# Patient Record
Sex: FEMALE | Race: BLACK OR AFRICAN AMERICAN | NOT HISPANIC OR LATINO | Employment: FULL TIME | URBAN - METROPOLITAN AREA
[De-identification: names, ages, dates, MRNs, and addresses within clinical notes are randomized per-mention and may not be internally consistent; named-entity substitution may affect disease eponyms.]

---

## 2017-11-14 ENCOUNTER — HOSPITAL ENCOUNTER (OUTPATIENT)
Dept: PREADMISSION TESTING | Facility: HOSPITAL | Age: 52
Discharge: HOME | End: 2017-11-14
Attending: ORTHOPAEDIC SURGERY | Admitting: ORTHOPAEDIC SURGERY

## 2017-11-14 LAB
ALBUMIN SERPL-MCNC: 3.9 G/DL (ref 3.5–4.8)
ALBUMIN/GLOB SERPL: 1.1 {RATIO} (ref 1–1.7)
ALP SERPL-CCNC: 78 IU/L (ref 32–91)
ALT SERPL-CCNC: 28 IU/L (ref 14–54)
ANION GAP SERPL CALC-SCNC: 11.5 MMOL/L (ref 10–20)
AST SERPL-CCNC: 23 IU/L (ref 15–41)
BASOPHILS # BLD AUTO: 0 10*3/UL (ref 0–0.2)
BASOPHILS NFR BLD AUTO: 1 % (ref 0–2)
BILIRUB SERPL-MCNC: 0.6 MG/DL (ref 0.3–1.2)
BUN SERPL-MCNC: 7 MG/DL (ref 8–20)
BUN/CREAT SERPL: 8.8 (ref 5.4–26.2)
CALCIUM SERPL-MCNC: 9.7 MG/DL (ref 8.9–10.3)
CHLORIDE SERPL-SCNC: 100 MMOL/L (ref 101–111)
CONV CO2: 29 MMOL/L (ref 22–32)
CONV TOTAL PROTEIN: 7.5 G/DL (ref 6.1–7.9)
CREAT UR-MCNC: 0.8 MG/DL (ref 0.4–1)
DIFFERENTIAL METHOD BLD: (no result)
EOSINOPHIL # BLD AUTO: 0.1 10*3/UL (ref 0–0.3)
EOSINOPHIL # BLD AUTO: 1 % (ref 0–3)
ERYTHROCYTE [DISTWIDTH] IN BLOOD BY AUTOMATED COUNT: 13.9 % (ref 11.5–14.5)
GLOBULIN UR ELPH-MCNC: 3.6 G/DL (ref 2.5–3.8)
GLUCOSE SERPL-MCNC: 104 MG/DL (ref 65–99)
HCT VFR BLD AUTO: 42.2 % (ref 35–49)
HGB BLD-MCNC: 14 G/DL (ref 12–15)
LYMPHOCYTES # BLD AUTO: 2.7 10*3/UL (ref 0.8–4.8)
LYMPHOCYTES NFR BLD AUTO: 46 % (ref 18–42)
MCH RBC QN AUTO: 30.1 PG (ref 26–32)
MCHC RBC AUTO-ENTMCNC: 33.1 G/DL (ref 32–36)
MCV RBC AUTO: 91.1 FL (ref 80–94)
MONOCYTES # BLD AUTO: 0.6 10*3/UL (ref 0.1–1.3)
MONOCYTES NFR BLD AUTO: 10 % (ref 2–11)
NEUTROPHILS # BLD AUTO: 2.5 10*3/UL (ref 2.3–8.6)
NEUTROPHILS NFR BLD AUTO: 42 % (ref 50–75)
NRBC BLD AUTO-RTO: 0 /100{WBCS}
NRBC/RBC NFR BLD MANUAL: 0 10*3/UL
PLATELET # BLD AUTO: 368 10*3/UL (ref 150–450)
PMV BLD AUTO: 7.9 FL (ref 7.4–10.4)
POTASSIUM SERPL-SCNC: 3.5 MMOL/L (ref 3.6–5.1)
RBC # BLD AUTO: 4.64 10*6/UL (ref 4–5.4)
SODIUM SERPL-SCNC: 137 MMOL/L (ref 136–144)
WBC # BLD AUTO: 5.9 10*3/UL (ref 4.5–11.5)

## 2018-06-25 ENCOUNTER — HOSPITAL ENCOUNTER (OUTPATIENT)
Dept: PAIN MEDICINE | Facility: HOSPITAL | Age: 53
Discharge: HOME | End: 2018-06-25
Attending: ANESTHESIOLOGY | Admitting: ANESTHESIOLOGY

## 2018-07-17 ENCOUNTER — HOSPITAL ENCOUNTER (OUTPATIENT)
Dept: PAIN MEDICINE | Facility: HOSPITAL | Age: 53
Discharge: HOME | End: 2018-07-17
Attending: ANESTHESIOLOGY | Admitting: ANESTHESIOLOGY

## 2018-08-14 ENCOUNTER — HOSPITAL ENCOUNTER (OUTPATIENT)
Dept: PAIN MEDICINE | Facility: HOSPITAL | Age: 53
Discharge: HOME | End: 2018-08-14
Attending: ANESTHESIOLOGY | Admitting: ANESTHESIOLOGY

## 2018-09-18 ENCOUNTER — HOSPITAL ENCOUNTER (OUTPATIENT)
Dept: PAIN MEDICINE | Facility: HOSPITAL | Age: 53
Discharge: HOME | End: 2018-09-18
Attending: ANESTHESIOLOGY | Admitting: ANESTHESIOLOGY

## 2018-10-09 ENCOUNTER — HOSPITAL ENCOUNTER (OUTPATIENT)
Dept: OTHER | Facility: HOSPITAL | Age: 53
Discharge: HOME | End: 2018-10-09
Attending: ANESTHESIOLOGY | Admitting: ANESTHESIOLOGY

## 2018-10-09 LAB
ALBUMIN SERPL-MCNC: 3.8 G/DL (ref 3.5–4.8)
ALBUMIN/GLOB SERPL: 1 {RATIO} (ref 1–1.7)
ALP SERPL-CCNC: 73 IU/L (ref 32–91)
ALT SERPL-CCNC: 18 IU/L (ref 14–54)
ANION GAP SERPL CALC-SCNC: 11.3 MMOL/L (ref 10–20)
APTT BLD: 22.1 SEC (ref 24–31)
AST SERPL-CCNC: 17 IU/L (ref 15–41)
BACTERIA SPEC AEROBE CULT: NORMAL
BASOPHILS # BLD AUTO: 0.1 10*3/UL (ref 0–0.2)
BASOPHILS NFR BLD AUTO: 1 % (ref 0–2)
BILIRUB SERPL-MCNC: 0.6 MG/DL (ref 0.3–1.2)
BILIRUB UR QL STRIP: NEGATIVE MG/DL
BUN SERPL-MCNC: 12 MG/DL (ref 8–20)
BUN/CREAT SERPL: 15 (ref 5.4–26.2)
CALCIUM SERPL-MCNC: 9.7 MG/DL (ref 8.9–10.3)
CASTS URNS QL MICRO: ABNORMAL /[LPF]
CHLORIDE SERPL-SCNC: 99 MMOL/L (ref 101–111)
COLOR UR: YELLOW
CONV BACTERIA IN URINE MICRO: ABNORMAL
CONV CLARITY OF URINE: CLEAR
CONV CO2: 32 MMOL/L (ref 22–32)
CONV HYALINE CASTS IN URINE MICRO: 3 /[LPF] (ref 0–5)
CONV PROTEIN IN URINE BY AUTOMATED TEST STRIP: NEGATIVE MG/DL
CONV SMALL ROUND CELLS: ABNORMAL /[HPF]
CONV TOTAL PROTEIN: 7.7 G/DL (ref 6.1–7.9)
CONV UROBILINOGEN IN URINE BY AUTOMATED TEST STRIP: 1 MG/DL
CREAT UR-MCNC: 0.8 MG/DL (ref 0.4–1)
CULTURE INDICATED?: ABNORMAL
CULTURE INDICATED?: ABNORMAL
DIFFERENTIAL METHOD BLD: (no result)
EOSINOPHIL # BLD AUTO: 0.1 10*3/UL (ref 0–0.3)
EOSINOPHIL # BLD AUTO: 2 % (ref 0–3)
ERYTHROCYTE [DISTWIDTH] IN BLOOD BY AUTOMATED COUNT: 15 % (ref 11.5–14.5)
GLOBULIN UR ELPH-MCNC: 3.9 G/DL (ref 2.5–3.8)
GLUCOSE SERPL-MCNC: 105 MG/DL (ref 65–99)
GLUCOSE UR QL: NEGATIVE MG/DL
HCT VFR BLD AUTO: 43.4 % (ref 35–49)
HGB BLD-MCNC: 14.4 G/DL (ref 12–15)
HGB UR QL STRIP: NEGATIVE
INR PPP: 1
KETONES UR QL STRIP: NEGATIVE MG/DL
LEUKOCYTE ESTERASE UR QL STRIP: NEGATIVE
LYMPHOCYTES # BLD AUTO: 2.8 10*3/UL (ref 0.8–4.8)
LYMPHOCYTES NFR BLD AUTO: 46 % (ref 18–42)
Lab: NORMAL
MCH RBC QN AUTO: 29.6 PG (ref 26–32)
MCHC RBC AUTO-ENTMCNC: 33.1 G/DL (ref 32–36)
MCV RBC AUTO: 89.4 FL (ref 80–94)
MICRO REPORT STATUS: NORMAL
MONOCYTES # BLD AUTO: 0.5 10*3/UL (ref 0.1–1.3)
MONOCYTES NFR BLD AUTO: 8 % (ref 2–11)
NEUTROPHILS # BLD AUTO: 2.7 10*3/UL (ref 2.3–8.6)
NEUTROPHILS NFR BLD AUTO: 43 % (ref 50–75)
NITRITE UR QL STRIP: NEGATIVE
NRBC BLD AUTO-RTO: 0 /100{WBCS}
NRBC/RBC NFR BLD MANUAL: 0 10*3/UL
PH UR STRIP.AUTO: 7 [PH] (ref 4.5–8)
PLATELET # BLD AUTO: 374 10*3/UL (ref 150–450)
PMV BLD AUTO: 7.9 FL (ref 7.4–10.4)
POTASSIUM SERPL-SCNC: 3.3 MMOL/L (ref 3.6–5.1)
PROTHROMBIN TIME: 10 SEC (ref 9.6–11.7)
RBC # BLD AUTO: 4.86 10*6/UL (ref 4–5.4)
RBC #/AREA URNS HPF: 2 /[HPF] (ref 0–3)
SODIUM SERPL-SCNC: 139 MMOL/L (ref 136–144)
SP GR UR: 1.02 (ref 1–1.03)
SPECIMEN SOURCE: NORMAL
SPERM URNS QL MICRO: ABNORMAL /[HPF]
SQUAMOUS SPT QL MICRO: 3 /[HPF] (ref 0–5)
UNIDENT CRYS URNS QL MICRO: ABNORMAL /[HPF]
WBC # BLD AUTO: 6.2 10*3/UL (ref 4.5–11.5)
WBC #/AREA URNS HPF: 1 /[HPF] (ref 0–5)
YEAST SPEC QL WET PREP: ABNORMAL /[HPF]

## 2018-11-15 ENCOUNTER — HOSPITAL ENCOUNTER (OUTPATIENT)
Dept: GENERAL RADIOLOGY | Facility: HOSPITAL | Age: 53
Discharge: HOME | End: 2018-11-15
Attending: NEUROLOGICAL SURGERY | Admitting: NEUROLOGICAL SURGERY

## 2018-12-11 ENCOUNTER — HOSPITAL ENCOUNTER (OUTPATIENT)
Dept: GENERAL RADIOLOGY | Facility: HOSPITAL | Age: 53
Discharge: HOME | End: 2018-12-11
Attending: NEUROLOGICAL SURGERY | Admitting: NEUROLOGICAL SURGERY

## 2019-01-16 ENCOUNTER — HOSPITAL ENCOUNTER (OUTPATIENT)
Dept: PAIN MEDICINE | Facility: HOSPITAL | Age: 54
Discharge: HOME | End: 2019-01-16
Attending: ANESTHESIOLOGY | Admitting: ANESTHESIOLOGY

## 2019-01-24 ENCOUNTER — HOSPITAL ENCOUNTER (OUTPATIENT)
Dept: GENERAL RADIOLOGY | Facility: HOSPITAL | Age: 54
Discharge: HOME | End: 2019-01-24
Attending: NEUROLOGICAL SURGERY | Admitting: NEUROLOGICAL SURGERY

## 2021-05-05 ENCOUNTER — PRIOR AUTHORIZATION (OUTPATIENT)
Dept: PAIN MEDICINE | Facility: CLINIC | Age: 56
End: 2021-05-05

## 2021-05-05 ENCOUNTER — OFFICE VISIT (OUTPATIENT)
Dept: PAIN MEDICINE | Facility: CLINIC | Age: 56
End: 2021-05-05

## 2021-05-05 VITALS
BODY MASS INDEX: 32.49 KG/M2 | SYSTOLIC BLOOD PRESSURE: 118 MMHG | HEIGHT: 65 IN | TEMPERATURE: 97.5 F | OXYGEN SATURATION: 99 % | HEART RATE: 78 BPM | WEIGHT: 195 LBS | RESPIRATION RATE: 16 BRPM | DIASTOLIC BLOOD PRESSURE: 82 MMHG

## 2021-05-05 DIAGNOSIS — M54.16 LUMBAR RADICULITIS: ICD-10-CM

## 2021-05-05 DIAGNOSIS — G89.4 CHRONIC PAIN SYNDROME: Primary | ICD-10-CM

## 2021-05-05 DIAGNOSIS — M47.817 LUMBOSACRAL SPONDYLOSIS WITHOUT MYELOPATHY: ICD-10-CM

## 2021-05-05 DIAGNOSIS — M47.812 CERVICAL SPONDYLOSIS WITHOUT MYELOPATHY: ICD-10-CM

## 2021-05-05 PROCEDURE — 99214 OFFICE O/P EST MOD 30 MIN: CPT | Performed by: ANESTHESIOLOGY

## 2021-05-05 RX ORDER — IBUPROFEN 800 MG/1
TABLET ORAL
COMMUNITY

## 2021-05-05 RX ORDER — VALACYCLOVIR HYDROCHLORIDE 1 G/1
TABLET, FILM COATED ORAL
COMMUNITY

## 2021-05-05 RX ORDER — PHENDIMETRAZINE TARTRATE 35 MG/1
TABLET ORAL
COMMUNITY
End: 2021-10-05

## 2021-05-05 RX ORDER — FLUCONAZOLE 200 MG/1
TABLET ORAL
COMMUNITY
Start: 2021-04-12 | End: 2021-08-24

## 2021-05-05 RX ORDER — CHLORTHALIDONE 25 MG/1
TABLET ORAL
COMMUNITY
End: 2021-10-05

## 2021-05-05 RX ORDER — PANTOPRAZOLE SODIUM 40 MG/1
TABLET, DELAYED RELEASE ORAL
COMMUNITY

## 2021-05-05 RX ORDER — ATENOLOL 50 MG/1
TABLET ORAL
COMMUNITY
End: 2021-10-05

## 2021-05-05 RX ORDER — CYANOCOBALAMIN 1000 UG/ML
INJECTION, SOLUTION INTRAMUSCULAR; SUBCUTANEOUS
COMMUNITY
End: 2021-10-05

## 2021-05-05 RX ORDER — BACLOFEN 10 MG/1
TABLET ORAL
COMMUNITY
Start: 2021-04-12

## 2021-05-05 RX ORDER — BACLOFEN 10 MG/1
TABLET ORAL
COMMUNITY
End: 2021-05-05 | Stop reason: SDUPTHER

## 2021-05-05 RX ORDER — TRAMADOL HYDROCHLORIDE 50 MG/1
TABLET ORAL
COMMUNITY
End: 2022-11-03

## 2021-05-05 RX ORDER — SUCRALFATE 1 G/1
TABLET ORAL
COMMUNITY
End: 2021-08-24

## 2021-05-05 NOTE — PROGRESS NOTES
Subjective    CC back pain  Rita Prado is a 56 y.o. female with polyarthralgia S/P bilateral shoulder surgeries, chronic neck pain S/P cervical fusion Oct 2018/Dr. Tai, back pain, here for f/u.   Last seen 2 years ago.  Present for evaluation of worsening lower back pain and right lower extremity radicular pain.  Symptoms for about 4 weeks after she restarted work.  Her job consists of some twisting and bending with standing.  Also reports right leg perceived weakness, occasional bowel incontinence.  Worsening chronic lower back pain radiating to bilateral lower extremity usually worse on the left with burning tingling numbness in the leg, worse with standing prolonged sitting or activity.    Pain interfere with ADL/sleep and work.    Had LESI  in the past with good relief.  Back pain interfering with daily activity/PT/Sleep     Utilizes baclofen and tramadol prescribed by PCP with mild relief.      L-spine MRI 2013: Diffuse degenerative changes in the lower lumbar spine. Disk bulging is seen at L2-L3, L3-L4, and L4-L5. Focal bulge (protrusion) of the disk of L5-S1 central and subligamentous, mild to moderate. No focal extrusion is evident.  Chronic degenerative signal alteration along endplates at L5-S1.  C-spine MRI multilevel degenerative changes, slight retrolisthesis C5-C6 asymmetry to the left.  Multiple level foraminal narrowing.  Osteophyte complex..     Pain Assessment   Location of Pain: Lower Back, R Hip, L Hip, L Leg, neck pain, joint  Description of Pain: Dull/Aching, Throbbing, Stabbing  Previous Pain Rating :10  Current Pain Rating: 10  Aggravating Factors: Activity  Alleviating Factors: Rest, Medication    PEG Assessment   What number best describes your pain on average in the past week?10  What number best describes how, during the past week, pain has interfered with your enjoyment of life?5  What number best describes how, during the past week, pain has interfered with your general activity?  "10     The following portions of the patient's history were reviewed and updated as appropriate: allergies, current medications, past family history, past medical history, past social history, past surgical history and problem list.     has a past medical history of GERD (gastroesophageal reflux disease), Hypertension, and Low back pain.   has a past surgical history that includes Rotator cuff repair; Shoulder surgery; and Neck surgery.  family history includes Alcohol abuse in her father; Cancer in her mother; Dementia in her father; Heart failure in her mother; Hypertension in her mother.  Social History     Tobacco Use   • Smoking status: Never Smoker   Substance Use Topics   • Alcohol use: Not Currently       Review of Systems   Musculoskeletal: Positive for back pain.        Left leg pain and weakness   All other systems reviewed and are negative.      Objective   Physical Exam  Constitutional:       General: She is not in acute distress.  Pulmonary:      Effort: Pulmonary effort is normal.   Musculoskeletal:      Lumbar back: Spasms and tenderness present. Decreased range of motion. Positive left straight leg raise test.      Comments: Lumbar loading positive, pain on extension of low back past 5 degrees.  TTP on the lumbar facets noted.  Left lumbar paraspinal tenderness   Neurological:      Gait: Gait abnormal.      Comments: Antalgic gait, right dorsiflexion strength 4 out of 5.       /82   Pulse 78   Temp 97.5 °F (36.4 °C)   Resp 16   Ht 165.1 cm (65\")   Wt 88.5 kg (195 lb)   SpO2 99%   BMI 32.45 kg/m²      PHQ 9 on chart  Opioid risk tool low risk    Assessment/Plan   Diagnoses and all orders for this visit:    1. Chronic pain syndrome (Primary)    2. Lumbosacral spondylosis without myelopathy    3. Lumbar radiculitis  -     Epidural Block    4. Cervical spondylosis without myelopathy    Summary  Rita Prado is a 56 y.o. female with polyarthralgia S/P bilateral shoulder surgeries, chronic " neck pain S/P cervical fusion Oct 2018/Dr. Tai, back pain, here for f/u.   Chronic pain from lumbar DDD spondylosis.  Chronic back pain postlaminectomy syndrome.    Last seen 2 years ago.  Present for evaluation of worsening lower back pain and  right lower extremity radicular pain associated with severe right lumbar paraspinal spasms.  Symptoms for about 4 weeks after she restarted work.  Her job consists of some twisting and bending with standing.  Also reports right leg perceived weakness, occasional bowel incontinence.  Pain interfere with ADL/work and sleep.    L-spine MRI pending.  Schedule for LESI x2.    RTC for procedure

## 2021-05-07 ENCOUNTER — TELEPHONE (OUTPATIENT)
Dept: PAIN MEDICINE | Facility: CLINIC | Age: 56
End: 2021-05-07

## 2021-05-07 NOTE — TELEPHONE ENCOUNTER
Provider: WILMAN GILLESPIE  Caller: BEULAH LAURENT  Relationship to Patient: SELF    Phone Number: [phone]    Reason for Call: PATIENT REQUESTING TO RESCHEDULE HER LUMBAR EPIDURAL-NEEDS TO SEE IF EPIDURAL IS AVAILABLE TO BE DONE ON A Friday DUE TO TIME OFF NEEDED FROM WORK

## 2021-05-17 ENCOUNTER — APPOINTMENT (OUTPATIENT)
Dept: PAIN MEDICINE | Facility: HOSPITAL | Age: 56
End: 2021-05-17

## 2021-05-27 ENCOUNTER — TELEPHONE (OUTPATIENT)
Dept: PAIN MEDICINE | Facility: HOSPITAL | Age: 56
End: 2021-05-27

## 2021-05-27 DIAGNOSIS — G89.4 CHRONIC PAIN SYNDROME: Primary | ICD-10-CM

## 2021-05-27 RX ORDER — DIAZEPAM 10 MG/1
10 TABLET ORAL ONCE
Qty: 1 TABLET | Refills: 0 | Status: SHIPPED | OUTPATIENT
Start: 2021-05-27 | End: 2021-05-27

## 2021-05-27 NOTE — TELEPHONE ENCOUNTER
Patient is requesting a valium to be sent into her pharmacy so she can take before her epidural tomorrow.

## 2021-05-28 ENCOUNTER — HOSPITAL ENCOUNTER (OUTPATIENT)
Dept: PAIN MEDICINE | Facility: HOSPITAL | Age: 56
Discharge: HOME | End: 2021-05-28

## 2021-05-28 VITALS
DIASTOLIC BLOOD PRESSURE: 95 MMHG | RESPIRATION RATE: 16 BRPM | BODY MASS INDEX: 33.32 KG/M2 | SYSTOLIC BLOOD PRESSURE: 130 MMHG | HEART RATE: 83 BPM | WEIGHT: 200 LBS | OXYGEN SATURATION: 98 % | HEIGHT: 65 IN | TEMPERATURE: 97.1 F

## 2021-05-28 DIAGNOSIS — M54.16 LUMBAR RADICULITIS: Primary | ICD-10-CM

## 2021-05-28 DIAGNOSIS — R52 PAIN: ICD-10-CM

## 2021-05-28 PROCEDURE — 62323 NJX INTERLAMINAR LMBR/SAC: CPT | Performed by: ANESTHESIOLOGY

## 2021-05-28 PROCEDURE — 0 IOPAMIDOL 41 % SOLUTION: Performed by: ANESTHESIOLOGY

## 2021-05-28 PROCEDURE — 77003 FLUOROGUIDE FOR SPINE INJECT: CPT

## 2021-05-28 PROCEDURE — 25010000002 METHYLPREDNISOLONE PER 40 MG: Performed by: ANESTHESIOLOGY

## 2021-05-28 RX ORDER — BUPIVACAINE HYDROCHLORIDE 2.5 MG/ML
10 INJECTION, SOLUTION EPIDURAL; INFILTRATION; INTRACAUDAL ONCE
Status: COMPLETED | OUTPATIENT
Start: 2021-05-28 | End: 2021-05-28

## 2021-05-28 RX ORDER — METHYLPREDNISOLONE ACETATE 40 MG/ML
40 INJECTION, SUSPENSION INTRA-ARTICULAR; INTRALESIONAL; INTRAMUSCULAR; SOFT TISSUE ONCE
Status: COMPLETED | OUTPATIENT
Start: 2021-05-28 | End: 2021-05-28

## 2021-05-28 RX ADMIN — METHYLPREDNISOLONE ACETATE 40 MG: 40 INJECTION, SUSPENSION INTRA-ARTICULAR; INTRALESIONAL; INTRAMUSCULAR; SOFT TISSUE at 09:19

## 2021-05-28 RX ADMIN — BUPIVACAINE HYDROCHLORIDE 10 ML: 2.5 INJECTION, SOLUTION EPIDURAL; INFILTRATION; INTRACAUDAL; PERINEURAL at 09:19

## 2021-05-28 RX ADMIN — IOPAMIDOL 3 ML: 408 INJECTION, SOLUTION INTRATHECAL at 09:00

## 2021-05-28 NOTE — ADDENDUM NOTE
Encounter addended by: Suzy Beckman LPN on: 5/28/2021 9:43 AM   Actions taken: MAR administration accepted

## 2021-05-28 NOTE — PROCEDURES
"Subjective    CC back pain  Rita Prado is a 56 y.o. female with lumbar radiculitis here for caudal UBALDO  No anticoagulation    Pain Assessment   Location of Pain: Lower Back, R Hip, L Hip, R Leg,   Description of Pain: Dull/Aching, Throbbing, Stabbing  Previous Pain Rating :7  Current Pain Ratin  Aggravating Factors: Activity  Alleviating Factors: Rest, Medication    The following portions of the patient's history were reviewed and updated as appropriate: allergies, current medications, past family history, past medical history, past social history, past surgical history and problem list.    Review of Systems  As in HPI  Objective   Physical Exam  Constitutional:       General: She is not in acute distress.     Appearance: She is well-developed.   Cardiovascular:      Rate and Rhythm: Normal rate.   Pulmonary:      Effort: Pulmonary effort is normal.   Musculoskeletal:      Lumbar back: Tenderness present. Decreased range of motion.   Neurological:      Mental Status: She is alert and oriented to person, place, and time.       /87   Pulse 81   Temp 97.1 °F (36.2 °C)   Resp 16   Ht 165.1 cm (65\")   Wt 90.7 kg (200 lb)   SpO2 98%   BMI 33.28 kg/m²     Assessment/Plan    underwent caudal UBALDO.  Interlaminar approach was attempted but not achieved.  L-spine MRI reviewed 2021 showing moderate degenerative changes progressed from previous.  Notably disc bulge with superimposed small right paracentral disc extrusion traversing 3 mm inferiorly narrowing the right lateral recess and abutting the descending right S1 nerve root. Mild bilateral facet arthropathy. Mild spinal canal stenosis. Moderate to severe right and severe left neural foraminal stenosis.     We will schedule for right L4 and L5 transforaminal UBALDO.    RTC for procedure    DATE OF PROCEDURE: 2021    PREOPERATIVE DIAGNOSIS:  lumbosacral DDD and radiculitis  POSTOPERATIVE DIAGNOSIS: Same    PROCEDURE PERFORMED: Caudal Epidural Steroid " Injection    The patient presents with a history of  lumbosacral degenerative disc disease with lumbosacral neuritis. The patient presents today for a caudal epidural steroid injection. The patient understands the risks and benefits of the procedure and wishes to proceed. The patient was seen in the preoperative area.  Patient's consent was obtained and updated.  Vitals were taken.  Patient was then brought to the procedure suite and placed in a prone position. The appropriate anatomic area was widely prepped with  Chloraprep and draped in a sterile fashion. Noninvasive monitoring per routine anesthesia protocol was placed.  Under fluoroscopic guidance using a lateral view a 22 guage curved spinal needle was passed through skin anesthesized with 1% Lidocaine without epinephrine.  The needle was advanced through the sacral hiatus and into the sacral epidural space using fluoroscopic guidance. Needle tip placement in the epidural space was confirmed by loss of resistance and injection of  1.5  mL of  preservative free contrast. Following this 10 mL of a solution containing  1 mL of 40 mg Depo-Medrol,  1 mL of  0.25% bupivacaine and 8 mL of preservative-free saline was carefully administered in the epidural space.   A sterile dressing was placed over the puncture site.    The patient tolerated the procedure with  no complications. They were then brought to the post procedure area where they recovered nicely.     Discharge:  The patient will be discharged home in stable condition.   Patient understands to contact the Center with any post procedure questions or concerns.  Discharge instructions given by nursing staff.

## 2021-06-11 ENCOUNTER — APPOINTMENT (OUTPATIENT)
Dept: PAIN MEDICINE | Facility: HOSPITAL | Age: 56
End: 2021-06-11

## 2021-06-25 ENCOUNTER — TELEPHONE (OUTPATIENT)
Dept: PAIN MEDICINE | Facility: CLINIC | Age: 56
End: 2021-06-25

## 2021-06-25 RX ORDER — DIAZEPAM 10 MG/1
10 TABLET ORAL ONCE AS NEEDED
Qty: 1 TABLET | Refills: 0 | Status: SHIPPED | OUTPATIENT
Start: 2021-06-25 | End: 2021-08-24

## 2021-06-25 NOTE — TELEPHONE ENCOUNTER
Caller: BEULAH LAURENT    Relationship: SELF    Best call back number: [phone]    What is the best time to reach you: ANY    Who are you requesting to speak with (clinical staff, provider,  specific staff member): UNKNOWN    What was the call regarding: UNKNOWN    Do you require a callback: YES. PATIENT HAD A CALL BUT NO VOICEMAIL    PHARMACY CLOSES AT 6 TONIGHT AND NEEDS A VALIUM SENT OVER BEFORE THEN

## 2021-06-28 ENCOUNTER — HOSPITAL ENCOUNTER (OUTPATIENT)
Dept: PAIN MEDICINE | Facility: HOSPITAL | Age: 56
Discharge: HOME | End: 2021-06-28

## 2021-06-28 ENCOUNTER — DOCUMENTATION (OUTPATIENT)
Dept: PAIN MEDICINE | Facility: CLINIC | Age: 56
End: 2021-06-28

## 2021-06-28 VITALS
OXYGEN SATURATION: 96 % | HEART RATE: 78 BPM | BODY MASS INDEX: 32.14 KG/M2 | RESPIRATION RATE: 16 BRPM | DIASTOLIC BLOOD PRESSURE: 81 MMHG | HEIGHT: 66 IN | WEIGHT: 200 LBS | TEMPERATURE: 96.9 F | SYSTOLIC BLOOD PRESSURE: 128 MMHG

## 2021-06-28 DIAGNOSIS — R52 PAIN: ICD-10-CM

## 2021-06-28 DIAGNOSIS — M54.16 LUMBAR RADICULITIS: Primary | ICD-10-CM

## 2021-06-28 PROCEDURE — 0 IOPAMIDOL 41 % SOLUTION: Performed by: ANESTHESIOLOGY

## 2021-06-28 PROCEDURE — 64484 NJX AA&/STRD TFRM EPI L/S EA: CPT | Performed by: ANESTHESIOLOGY

## 2021-06-28 PROCEDURE — 77003 FLUOROGUIDE FOR SPINE INJECT: CPT

## 2021-06-28 PROCEDURE — 64483 NJX AA&/STRD TFRM EPI L/S 1: CPT | Performed by: ANESTHESIOLOGY

## 2021-06-28 PROCEDURE — 25010000002 DEXAMETHASONE SODIUM PHOSPHATE 10 MG/ML SOLUTION: Performed by: ANESTHESIOLOGY

## 2021-06-28 RX ORDER — DEXAMETHASONE SODIUM PHOSPHATE 10 MG/ML
10 INJECTION, SOLUTION INTRAMUSCULAR; INTRAVENOUS ONCE
Status: COMPLETED | OUTPATIENT
Start: 2021-06-28 | End: 2021-06-28

## 2021-06-28 RX ORDER — ERGOCALCIFEROL 1.25 MG/1
50000 CAPSULE ORAL
COMMUNITY
End: 2021-06-28 | Stop reason: SDUPTHER

## 2021-06-28 RX ORDER — SACCHAROMYCES BOULARDII 250 MG
CAPSULE ORAL
COMMUNITY
End: 2021-08-24

## 2021-06-28 RX ORDER — DIAZEPAM 10 MG/1
10 TABLET ORAL ONCE
Qty: 1 TABLET | Refills: 0 | Status: SHIPPED | OUTPATIENT
Start: 2021-06-28 | End: 2021-06-28

## 2021-06-28 RX ORDER — CALCIUM POLYCARBOPHIL 625 MG/1
TABLET, FILM COATED ORAL
COMMUNITY
Start: 2021-06-03

## 2021-06-28 RX ORDER — BUPIVACAINE HYDROCHLORIDE 2.5 MG/ML
10 INJECTION, SOLUTION EPIDURAL; INFILTRATION; INTRACAUDAL ONCE
Status: COMPLETED | OUTPATIENT
Start: 2021-06-28 | End: 2021-06-28

## 2021-06-28 RX ADMIN — BUPIVACAINE HYDROCHLORIDE 5 ML: 2.5 INJECTION, SOLUTION EPIDURAL; INFILTRATION; INTRACAUDAL; PERINEURAL at 09:11

## 2021-06-28 RX ADMIN — DEXAMETHASONE SODIUM PHOSPHATE 10 MG: 10 INJECTION, SOLUTION INTRAMUSCULAR; INTRAVENOUS at 09:11

## 2021-06-28 RX ADMIN — IOPAMIDOL 1 ML: 408 INJECTION, SOLUTION INTRATHECAL at 09:08

## 2021-06-28 NOTE — ADDENDUM NOTE
Encounter addended by: Jacki Snyder MD on: 6/28/2021 12:16 PM   Actions taken: Order list changed, Diagnosis association updated

## 2021-06-28 NOTE — PROGRESS NOTES
PT would like to know if you can send her in 1 valium to take before procedure. SCHEDULED 07/19

## 2021-06-28 NOTE — PROCEDURES
"Subjective    CC back pain  Rita Prado is a 56 y.o. female with lumbar radiculitis here for right L4, L5 TFESI. No anticoagulation    Pain Assessment   Location of Pain: Lower Back, R Hip, L Hip, R Leg,   Description of Pain: Dull/Aching, Throbbing, Stabbing  Previous Pain Rating :7  Current Pain Ratin  Aggravating Factors: Activity  Alleviating Factors: Rest, Medication    The following portions of the patient's history were reviewed and updated as appropriate: allergies, current medications, past family history, past medical history, past social history, past surgical history and problem list.    Review of Systems  As in HPI  Objective   Physical Exam  Constitutional:       General: She is not in acute distress.     Appearance: She is well-developed.   Cardiovascular:      Rate and Rhythm: Normal rate.   Pulmonary:      Effort: Pulmonary effort is normal.   Musculoskeletal:      Lumbar back: Tenderness present. Decreased range of motion.   Neurological:      Mental Status: She is alert and oriented to person, place, and time.       /81 (BP Location: Left arm, Patient Position: Lying)   Pulse 78   Temp 96.9 °F (36.1 °C) (Skin)   Resp 16   Ht 166.4 cm (65.5\")   Wt 90.7 kg (200 lb)   SpO2 96%   BMI 32.78 kg/m²     Assessment/Plan    underwent right L4, L5 TFESI  Complains of pain on left side and left leg as well and would like TESI on the left.   Will schedule for Left L4, L5 TFESI.  RTC for procedure    DATE OF PROCEDURE:  2021    PREOPERATIVE DIAGNOSIS:   Lumbar radiculitis    POSTOPERATIVE DIAGNOSIS: Same    PROCEDURE PERFORMED: Right L4, L5  Transforaminal Epidural     The patient presents with a history of  lumbar degenerative disc disease with lumbosacral neuritis in the right leg at level [ L4,  L5].  The patient presents today for a transforaminal epidural. The patient understands the risks and benefits of the procedure and wishes to proceed. The patient was seen in the preoperative " area.  Patient's consent was obtained and updated.  Vitals were taken.  Patient was then brought to the procedure suite and placed in a prone position. Noninvasive monitoring per routine anesthesia protocol was placed.  The appropriate anatomic area was widely prepped with Chloroprep and draped in a sterile fashion.  Under fluoroscopic guidance using an AP and lateral view, a 22 guage curved tip spinal needle  was passed through skin anesthetized with 1% Lidocaine without epinephrine. The needle tip was advanced to the inferior medial aspect of the transverse process and carefully walked into the neuroforamin using an AP lateral view.  At no time were parathesias elicited.  At this point 2 mL of a solution containing  1 mL of 0.25% bupivacaine and 1 mL of 10 mg Decadron were injected.  Clear epidural spread using 0.25 mL of  preservative free contrast was obtained.  A sterile dressing was placed over the puncture site.    The patient tolerated the procedure with no complications . They were then brought to the post procedure area where they recovered nicely.    Discharge:  The patient will be discharged home in stable condition.   Patient understands to contact the Center with any post procedure questions or concerns.  Discharge instructions given by nursing staff.

## 2021-06-28 NOTE — PROGRESS NOTES
L-spine MRI reviewed 5/2021 showing moderate degenerative changes progressed from previous.  Notably disc bulge with superimposed small right paracentral disc extrusion traversing 3 mm inferiorly narrowing the right lateral recess and abutting the descending right S1 nerve root. Mild bilateral facet arthropathy. Mild spinal canal stenosis. Moderate to severe right and severe left neural foraminal stenosis.

## 2021-06-29 ENCOUNTER — TELEPHONE (OUTPATIENT)
Dept: PAIN MEDICINE | Facility: HOSPITAL | Age: 56
End: 2021-06-29

## 2021-07-12 ENCOUNTER — HOSPITAL ENCOUNTER (OUTPATIENT)
Dept: PAIN MEDICINE | Facility: HOSPITAL | Age: 56
Discharge: HOME | End: 2021-07-12

## 2021-07-12 VITALS
SYSTOLIC BLOOD PRESSURE: 117 MMHG | HEIGHT: 66 IN | TEMPERATURE: 98 F | DIASTOLIC BLOOD PRESSURE: 95 MMHG | HEART RATE: 95 BPM | RESPIRATION RATE: 16 BRPM | WEIGHT: 200 LBS | BODY MASS INDEX: 32.14 KG/M2 | OXYGEN SATURATION: 98 %

## 2021-07-12 DIAGNOSIS — M47.814 THORACIC SPONDYLOSIS: ICD-10-CM

## 2021-07-12 DIAGNOSIS — R52 PAIN: ICD-10-CM

## 2021-07-12 DIAGNOSIS — M54.16 LUMBAR RADICULITIS: Primary | ICD-10-CM

## 2021-07-12 DIAGNOSIS — M54.14 THORACIC NEURITIS: ICD-10-CM

## 2021-07-12 PROCEDURE — 77003 FLUOROGUIDE FOR SPINE INJECT: CPT

## 2021-07-12 PROCEDURE — 0 IOPAMIDOL 41 % SOLUTION: Performed by: ANESTHESIOLOGY

## 2021-07-12 PROCEDURE — 64483 NJX AA&/STRD TFRM EPI L/S 1: CPT | Performed by: ANESTHESIOLOGY

## 2021-07-12 PROCEDURE — 64484 NJX AA&/STRD TFRM EPI L/S EA: CPT | Performed by: ANESTHESIOLOGY

## 2021-07-12 PROCEDURE — 25010000002 DEXAMETHASONE SODIUM PHOSPHATE 10 MG/ML SOLUTION: Performed by: ANESTHESIOLOGY

## 2021-07-12 RX ORDER — BUPIVACAINE HYDROCHLORIDE 2.5 MG/ML
10 INJECTION, SOLUTION EPIDURAL; INFILTRATION; INTRACAUDAL ONCE
Status: COMPLETED | OUTPATIENT
Start: 2021-07-12 | End: 2021-07-12

## 2021-07-12 RX ORDER — DEXAMETHASONE SODIUM PHOSPHATE 10 MG/ML
10 INJECTION, SOLUTION INTRAMUSCULAR; INTRAVENOUS ONCE
Status: COMPLETED | OUTPATIENT
Start: 2021-07-12 | End: 2021-07-12

## 2021-07-12 RX ADMIN — BUPIVACAINE HYDROCHLORIDE 10 ML: 2.5 INJECTION, SOLUTION EPIDURAL; INFILTRATION; INTRACAUDAL; PERINEURAL at 11:37

## 2021-07-12 RX ADMIN — DEXAMETHASONE SODIUM PHOSPHATE 10 MG: 10 INJECTION, SOLUTION INTRAMUSCULAR; INTRAVENOUS at 11:37

## 2021-07-12 RX ADMIN — IOPAMIDOL 3 ML: 408 INJECTION, SOLUTION INTRATHECAL at 11:36

## 2021-07-12 NOTE — PROCEDURES
"Subjective    CC back pain  Rita Prado is a 56 y.o. female with lumbar radiculitis here for left L4, L5 TFESI. No anticoagulation    Pain Assessment   Location of Pain: Lower Back, R Hip, L Hip, R Leg,   Description of Pain: Dull/Aching, Throbbing, Stabbing  Previous Pain Rating :7  Current Pain Ratin  Aggravating Factors: Activity  Alleviating Factors: Rest, Medication    The following portions of the patient's history were reviewed and updated as appropriate: allergies, current medications, past family history, past medical history, past social history, past surgical history and problem list.    Review of Systems  As in HPI  Objective   Physical Exam  Constitutional:       General: She is not in acute distress.     Appearance: She is well-developed.   Cardiovascular:      Rate and Rhythm: Normal rate.   Pulmonary:      Effort: Pulmonary effort is normal.   Musculoskeletal:      Lumbar back: Tenderness present. Decreased range of motion.   Neurological:      Mental Status: She is alert and oriented to person, place, and time.       /95 (BP Location: Left arm, Patient Position: Sitting)   Pulse 95   Temp 98 °F (36.7 °C) (Skin)   Resp 16   Ht 166.4 cm (65.5\")   Wt 90.7 kg (200 lb)   SpO2 98%   BMI 32.78 kg/m²     Assessment/Plan    underwent left L4, L5 TFESI  Complains of continued and worsening thoracic back pain radiating to chest.  T-spine MRI ordered for further evaluation.  No relief of physical therapy.  Interfering with work    RTC 6 weeks    DATE OF PROCEDURE:  2021    PREOPERATIVE DIAGNOSIS:   Lumbar radiculitis    POSTOPERATIVE DIAGNOSIS: Same    PROCEDURE PERFORMED: Left L4, L5  Transforaminal Epidural     The patient presents with a history of  lumbar degenerative disc disease with lumbosacral neuritis in the left leg at level [ L4,  L5].  The patient presents today for a transforaminal epidural. The patient understands the risks and benefits of the procedure and wishes to " proceed. The patient was seen in the preoperative area.  Patient's consent was obtained and updated.  Vitals were taken.  Patient was then brought to the procedure suite and placed in a prone position. Noninvasive monitoring per routine anesthesia protocol was placed.  The appropriate anatomic area was widely prepped with Chloroprep and draped in a sterile fashion.  Under fluoroscopic guidance using an AP and lateral view, a 22 guage curved tip spinal needle  was passed through skin anesthetized with 1% Lidocaine without epinephrine. The needle tip was advanced to the inferior medial aspect of the transverse process and carefully walked into the neuroforamin using an AP lateral view.  At no time were parathesias elicited.  At this point 2 mL of a solution containing  1 mL of 0.25% bupivacaine and 1 mL of 10 mg Decadron were injected.  Clear epidural spread using 0.25 mL of  preservative free contrast was obtained.  A sterile dressing was placed over the puncture site.    The patient tolerated the procedure with no complications . They were then brought to the post procedure area where they recovered nicely.    Discharge:  The patient will be discharged home in stable condition.   Patient understands to contact the Center with any post procedure questions or concerns.  Discharge instructions given by nursing staff.

## 2021-07-13 ENCOUNTER — TELEPHONE (OUTPATIENT)
Dept: PAIN MEDICINE | Facility: HOSPITAL | Age: 56
End: 2021-07-13

## 2021-07-13 NOTE — TELEPHONE ENCOUNTER
Post op procedure call made. Spoke with patient, he reports doing well with no questions or concerns.

## 2021-08-24 ENCOUNTER — OFFICE VISIT (OUTPATIENT)
Dept: PAIN MEDICINE | Facility: CLINIC | Age: 56
End: 2021-08-24

## 2021-08-24 VITALS
HEIGHT: 66 IN | BODY MASS INDEX: 32.14 KG/M2 | RESPIRATION RATE: 16 BRPM | WEIGHT: 200 LBS | DIASTOLIC BLOOD PRESSURE: 88 MMHG | HEART RATE: 74 BPM | SYSTOLIC BLOOD PRESSURE: 127 MMHG | OXYGEN SATURATION: 95 %

## 2021-08-24 DIAGNOSIS — M54.14 THORACIC NEURITIS: ICD-10-CM

## 2021-08-24 DIAGNOSIS — M47.817 LUMBOSACRAL SPONDYLOSIS WITHOUT MYELOPATHY: ICD-10-CM

## 2021-08-24 DIAGNOSIS — M96.1 POSTLAMINECTOMY SYNDROME OF CERVICAL REGION: ICD-10-CM

## 2021-08-24 DIAGNOSIS — M47.814 THORACIC SPONDYLOSIS: ICD-10-CM

## 2021-08-24 DIAGNOSIS — G89.4 CHRONIC PAIN SYNDROME: Primary | ICD-10-CM

## 2021-08-24 PROCEDURE — 99214 OFFICE O/P EST MOD 30 MIN: CPT | Performed by: ANESTHESIOLOGY

## 2021-08-24 NOTE — PROGRESS NOTES
Subjective    CC back pain  Rita Prado is a 56 y.o. female with polyarthralgia S/P bilateral shoulder surgeries, chronic neck pain S/P ACDF Oct 2018/Dr. Tai, back pain, here for f/u.   Good relief with right and left lumbar transforaminal UBALDO.  Significant functional benefit discussed/returned to work.  Complains of continued and worsening mid back/thoracic back pain associated with significant paraspinal muscle spasm and pain.  Chronic lower back pain radiating to bilateral lower extremity usually worse on the left with burning tingling numbness in the leg, worse with standing prolonged sitting or activity.  Denies weakness, bladder bowel continence.  Chronic neck pain from postlaminectomy syndrome with paraspinal muscle spasm radiating to bilateral shoulders.     Pain interfere with ADL/sleep and work.    Good relief of LESI, transforaminal UBALDO, cervical UBALDO.    Utilizes baclofen and tramadol prescribed by PCP with mild relief.    L-spine MRI reviewed 5/2021 showing moderate degenerative changes progressed from previous.  Notably disc bulge with superimposed small right paracentral disc extrusion traversing 3 mm inferiorly narrowing the right lateral recess and abutting the descending right S1 nerve root. Mild bilateral facet arthropathy. Mild spinal canal stenosis. Moderate to severe right and severe left neural foraminal stenosis.     C-spine MRI multilevel degenerative changes, slight retrolisthesis C5-C6 asymmetry to the left.  Multiple level foraminal narrowing.  Osteophyte complex..     Pain Assessment   Location of Pain: Lower Back, R Hip, L Hip, L Leg, neck pain, joint  Description of Pain: Dull/Aching, Throbbing, Stabbing  Previous Pain Rating :10  Current Pain Rating: 10  Aggravating Factors: Activity  Alleviating Factors: Rest, Medication    PEG Assessment   What number best describes your pain on average in the past week?10  What number best describes how, during the past week, pain has  "interfered with your enjoyment of life?5  What number best describes how, during the past week, pain has interfered with your general activity? 10     The following portions of the patient's history were reviewed and updated as appropriate: allergies, current medications, past family history, past medical history, past social history, past surgical history and problem list.     has a past medical history of GERD (gastroesophageal reflux disease), Hypertension, and Low back pain.   has a past surgical history that includes Rotator cuff repair; Shoulder surgery; and Neck surgery.  family history includes Alcohol abuse in her father; Cancer in her mother; Dementia in her father; Heart failure in her mother; Hypertension in her mother.  Social History     Tobacco Use   • Smoking status: Never Smoker   • Smokeless tobacco: Never Used   Substance Use Topics   • Alcohol use: Not Currently       Review of Systems   Musculoskeletal: Positive for back pain.        Left leg pain and weakness   All other systems reviewed and are negative.    Objective   Physical Exam  Vitals reviewed.   Constitutional:       General: She is not in acute distress.  Neck:      Meningeal: Kernig's sign present.   Pulmonary:      Effort: Pulmonary effort is normal.   Musculoskeletal:      Cervical back: Tenderness present. Decreased range of motion.      Thoracic back: Spasms and tenderness present.      Lumbar back: Spasms and tenderness present. Decreased range of motion. Positive left straight leg raise test.      Comments: Lumbar loading positive, pain on extension of low back past 5 degrees.  TTP on the lumbar facets noted.  Left lumbar paraspinal tenderness   Neurological:      Gait: Gait abnormal.      Comments: Antalgic gait, right dorsiflexion strength 4 out of 5.       /88   Pulse 74   Resp 16   Ht 166.4 cm (65.5\")   Wt 90.7 kg (200 lb)   SpO2 95%   BMI 32.78 kg/m²      PHQ 9 on chart  Opioid risk tool low " risk    Assessment/Plan   Diagnoses and all orders for this visit:    1. Chronic pain syndrome (Primary)    2. Thoracic spondylosis    3. Thoracic neuritis  -     MRI Thoracic Spine Without Contrast; Future    4. Lumbosacral spondylosis without myelopathy    5. Postlaminectomy syndrome of cervical region    Summary  Rita Prado is a 56 y.o. female with polyarthralgia S/P bilateral shoulder surgeries, chronic neck pain S/P cervical fusion Oct 2018/Dr. Tai, back pain, here for f/u.   Chronic pain from lumbar DDD spondylosis.  Chronic back pain postlaminectomy syndrome.    Good relief with right and left lumbar transforaminal UBALDO.  Significant functional benefit discussed/returned to work.    Complains of continued and worsening mid back/thoracic back pain associated with significant paraspinal muscle spasm and pain.  Pain interfere with sleep and work.  T-spine MRI ordered for evaluation.  Consider thoracic UBALDO after review.  Risk and benefits discussed    Start over-the-counter Voltaren gel    RTC 6 weeks or after MRI.

## 2021-10-05 ENCOUNTER — OFFICE VISIT (OUTPATIENT)
Dept: PAIN MEDICINE | Facility: CLINIC | Age: 56
End: 2021-10-05

## 2021-10-05 VITALS
RESPIRATION RATE: 16 BRPM | BODY MASS INDEX: 32.14 KG/M2 | SYSTOLIC BLOOD PRESSURE: 146 MMHG | DIASTOLIC BLOOD PRESSURE: 95 MMHG | OXYGEN SATURATION: 95 % | HEART RATE: 80 BPM | WEIGHT: 200 LBS | HEIGHT: 66 IN

## 2021-10-05 DIAGNOSIS — G89.4 CHRONIC PAIN SYNDROME: Primary | ICD-10-CM

## 2021-10-05 DIAGNOSIS — M47.817 LUMBOSACRAL SPONDYLOSIS WITHOUT MYELOPATHY: ICD-10-CM

## 2021-10-05 DIAGNOSIS — M54.14 THORACIC NEURITIS: ICD-10-CM

## 2021-10-05 DIAGNOSIS — M47.814 THORACIC SPONDYLOSIS: ICD-10-CM

## 2021-10-05 DIAGNOSIS — M96.1 POSTLAMINECTOMY SYNDROME OF CERVICAL REGION: ICD-10-CM

## 2021-10-05 DIAGNOSIS — M46.1 SACROILIITIS (HCC): ICD-10-CM

## 2021-10-05 PROCEDURE — 99214 OFFICE O/P EST MOD 30 MIN: CPT | Performed by: ANESTHESIOLOGY

## 2021-10-05 RX ORDER — MINOXIDIL 10 MG/1
10 TABLET ORAL DAILY
COMMUNITY
Start: 2021-08-26

## 2021-10-05 RX ORDER — DULAGLUTIDE 0.75 MG/.5ML
INJECTION, SOLUTION SUBCUTANEOUS WEEKLY
COMMUNITY
Start: 2021-08-26 | End: 2022-05-17

## 2021-10-05 RX ORDER — METOPROLOL TARTRATE AND HYDROCHLOROTHIAZIDE 50; 25 MG/1; MG/1
TABLET ORAL DAILY
COMMUNITY
Start: 2021-08-26

## 2021-10-05 NOTE — PROGRESS NOTES
Subjective    CC back pain  Rita Prado is a 56 y.o. female with polyarthralgia S/P bilateral shoulder surgeries, chronic neck pain S/P ACDF Oct 2018/Dr. Tai, back pain, here for f/u.   Complains of right buttock/SI tenderness referring with work and walking.  T-spine MRI not completed yet.  Continued mid back/thoracic back pain associated with significant paraspinal muscle spasm and pain.  Chronic lower back pain radiating to bilateral lower extremity usually worse on the left with burning tingling numbness in the leg, worse with standing prolonged sitting or activity.  Denies weakness, bladder bowel continence.  Chronic neck pain from postlaminectomy syndrome with paraspinal muscle spasm radiating to bilateral shoulders.     Pain interfere with ADL/sleep and work.    Good relief of LESI, transforaminal UBALDO, cervical UBALDO.    Utilizes baclofen and tramadol prescribed by PCP with mild relief.    L-spine MRI reviewed 2021 showing moderate degenerative changes progressed from previous.  Notably disc bulge with superimposed small right paracentral disc extrusion traversing 3 mm inferiorly narrowing the right lateral recess and abutting the descending right S1 nerve root. Mild bilateral facet arthropathy. Mild spinal canal stenosis. Moderate to severe right and severe left neural foraminal stenosis.     C-spine MRI multilevel degenerative changes, slight retrolisthesis C5-C6 asymmetry to the left.  Multiple level foraminal narrowing.  Osteophyte complex..     Pain Assessment   Location of Pain: Lower Back, R Hip, L Hip, L Leg, neck pain, joint  Description of Pain: Dull/Aching, Throbbing, Stabbing  Previous Pain Rating :10  Current Pain Ratin  Aggravating Factors: Activity  Alleviating Factors: Rest, Medication    PEG Assessment   What number best describes your pain on average in the past week?7  What number best describes how, during the past week, pain has interfered with your enjoyment of life?5  What  "number best describes how, during the past week, pain has interfered with your general activity? 8     The following portions of the patient's history were reviewed and updated as appropriate: allergies, current medications, past family history, past medical history, past social history, past surgical history and problem list.     has a past medical history of GERD (gastroesophageal reflux disease), Hypertension, and Low back pain.   has a past surgical history that includes Rotator cuff repair; Shoulder surgery; and Neck surgery.  family history includes Alcohol abuse in her father; Cancer in her mother; Dementia in her father; Heart failure in her mother; Hypertension in her mother.  Social History     Tobacco Use   • Smoking status: Never Smoker   • Smokeless tobacco: Never Used   Substance Use Topics   • Alcohol use: Not Currently       Review of Systems   Musculoskeletal: Positive for back pain.        Left leg pain and weakness   All other systems reviewed and are negative.    Objective   Physical Exam  Vitals reviewed.   Constitutional:       General: She is not in acute distress.  Neck:      Meningeal: Kernig's sign present.   Pulmonary:      Effort: Pulmonary effort is normal.   Musculoskeletal:      Cervical back: Tenderness present. Decreased range of motion.      Thoracic back: Spasms and tenderness present.      Lumbar back: Spasms and tenderness present. Decreased range of motion. Positive left straight leg raise test.      Comments: Lumbar loading positive, pain on extension of low back past 5 degrees.  TTP on the lumbar facets noted.  Left lumbar paraspinal tenderness   Neurological:      Gait: Gait abnormal.      Comments: Antalgic gait, right dorsiflexion strength 4 out of 5.       /95   Pulse 80   Resp 16   Ht 166.4 cm (65.5\")   Wt 90.7 kg (200 lb)   SpO2 95%   BMI 32.78 kg/m²      PHQ 9 on chart  Opioid risk tool low risk    Assessment/Plan   Diagnoses and all orders for this " visit:    1. Chronic pain syndrome (Primary)    2. Thoracic spondylosis    3. Thoracic neuritis    4. Postlaminectomy syndrome of cervical region    5. Lumbosacral spondylosis without myelopathy    6. Sacroiliitis (HCC)  -     SI Joint Injection    Summary  Rita Prado is a 56 y.o. female with polyarthralgia S/P bilateral shoulder surgeries, chronic neck pain S/P cervical fusion Oct 2018/Dr. Tai, back pain, here for f/u.   Chronic pain from lumbar DDD spondylosis.  Chronic back pain postlaminectomy syndrome.    Complains of right buttock/SI tenderness referring with work and walking.  Denies significant lower back pain.  Schedule for right SI injection.  Risk and benefit discussed.    Continue need thoracic back pain mostly right-sided.  T-spine MRI not completed yet.    Continues to be frustrated with chronic pain neck/back pain mostly right-sided in pretty much continued chronic pain to right side of her body interfering with any activity and work.   Utilizes baclofen, tramadol, ibuprofen with marginal relief.  Considered but declined more opioid at this time.    Discussed medical stimulator therapy/trial process.  Information material provided for review.  Will discuss further at next visits.    RTC for procedure

## 2021-11-12 ENCOUNTER — HOSPITAL ENCOUNTER (OUTPATIENT)
Dept: PAIN MEDICINE | Facility: HOSPITAL | Age: 56
Discharge: HOME | End: 2021-11-12

## 2021-11-12 VITALS
WEIGHT: 195 LBS | RESPIRATION RATE: 16 BRPM | OXYGEN SATURATION: 96 % | HEIGHT: 66 IN | HEART RATE: 94 BPM | DIASTOLIC BLOOD PRESSURE: 80 MMHG | BODY MASS INDEX: 31.34 KG/M2 | SYSTOLIC BLOOD PRESSURE: 108 MMHG | TEMPERATURE: 98 F

## 2021-11-12 DIAGNOSIS — R52 PAIN: ICD-10-CM

## 2021-11-12 DIAGNOSIS — M46.1 SACROILIITIS (HCC): ICD-10-CM

## 2021-11-12 PROCEDURE — 25010000002 METHYLPREDNISOLONE PER 40 MG: Performed by: ANESTHESIOLOGY

## 2021-11-12 PROCEDURE — 27096 INJECT SACROILIAC JOINT: CPT | Performed by: ANESTHESIOLOGY

## 2021-11-12 PROCEDURE — 77003 FLUOROGUIDE FOR SPINE INJECT: CPT

## 2021-11-12 PROCEDURE — 0 IOPAMIDOL 41 % SOLUTION: Performed by: ANESTHESIOLOGY

## 2021-11-12 RX ORDER — METHYLPREDNISOLONE ACETATE 40 MG/ML
40 INJECTION, SUSPENSION INTRA-ARTICULAR; INTRALESIONAL; INTRAMUSCULAR; SOFT TISSUE ONCE
Status: COMPLETED | OUTPATIENT
Start: 2021-11-12 | End: 2021-11-12

## 2021-11-12 RX ORDER — BUPIVACAINE HYDROCHLORIDE 2.5 MG/ML
10 INJECTION, SOLUTION EPIDURAL; INFILTRATION; INTRACAUDAL ONCE
Status: COMPLETED | OUTPATIENT
Start: 2021-11-12 | End: 2021-11-12

## 2021-11-12 RX ADMIN — METHYLPREDNISOLONE ACETATE 40 MG: 40 INJECTION, SUSPENSION INTRA-ARTICULAR; INTRALESIONAL; INTRAMUSCULAR; INTRASYNOVIAL; SOFT TISSUE at 09:27

## 2021-11-12 RX ADMIN — BUPIVACAINE HYDROCHLORIDE 4 ML: 2.5 INJECTION, SOLUTION EPIDURAL; INFILTRATION; INTRACAUDAL; PERINEURAL at 09:27

## 2021-11-12 RX ADMIN — IOPAMIDOL 1 ML: 408 INJECTION, SOLUTION INTRATHECAL at 09:27

## 2021-11-12 NOTE — PROCEDURES
"Subjective    CC back pain  Rita Prado is a 56 y.o. female with right sacroiliitis here for right SI injection.. No anticoagulation    Pain Assessment   Location of Pain: Lower Back, R Hip, L Hip, R Leg,   Description of Pain: Dull/Aching, Throbbing, Stabbing  Previous Pain Rating :7  Current Pain Ratin  Aggravating Factors: Activity  Alleviating Factors: Rest, Medication    The following portions of the patient's history were reviewed and updated as appropriate: allergies, current medications, past family history, past medical history, past social history, past surgical history and problem list.    Review of Systems  As in HPI  Objective   Physical Exam  Constitutional:       General: She is not in acute distress.     Appearance: She is well-developed.   Cardiovascular:      Rate and Rhythm: Normal rate.   Pulmonary:      Effort: Pulmonary effort is normal.   Musculoskeletal:      Lumbar back: Tenderness present. Decreased range of motion.   Neurological:      Mental Status: She is alert and oriented to person, place, and time.       /82 (BP Location: Right arm, Patient Position: Sitting)   Pulse 99   Temp 98 °F (36.7 °C) (Skin)   Resp 16   Ht 166.4 cm (65.5\")   Wt 88.5 kg (195 lb)   SpO2 97%   BMI 31.96 kg/m²     Assessment/Plan    underwent right SI injection.    RTC as needed.    DATE OF PROCEDURE:  2021    PREOPERATIVE DIAGNOSIS: sacroiliitis    POSTOPERATIVE DIAGNOSIS: same    PROCEDURE PERFORMED: Right SACROILIAC JOINT INJECTION    The patient understands the risks and benefits of the procedure and wishes to proceed. The patient was seen in the preoperative area. Patient's consent was obtained and updated. Vitals were taken. Patient was then brought to the procedure suite and placed in prone position for sacroiliac joint injection. The appropriate anatomic area was widely prepped with Chloraprep and draped in a sterile fashion. Noninvasive monitoring per routine anesthesia protocol " was placed. Under fluoroscopic guidance using an AP view, a 22 gauge curved tip spinal needle was passed through skin anesthetized with 1% Lidocaine without epinephrine. The needle tip was guided to the lower pole of the joint using fluoroscopy. 1 mL of  preservative free contrast was injected into the joint to confirm location. A clear outline was obtained and 5 mL of steroid solution containing 4 mL 0.25% bupivacaine, and 1mL 40mg Depomedrol was injected. The patient tolerated with no keke-procedural complications.  A sterile dressing was placed over the puncture sites.

## 2022-05-17 ENCOUNTER — OFFICE VISIT (OUTPATIENT)
Dept: PAIN MEDICINE | Facility: CLINIC | Age: 57
End: 2022-05-17

## 2022-05-17 VITALS
BODY MASS INDEX: 31.34 KG/M2 | HEIGHT: 66 IN | DIASTOLIC BLOOD PRESSURE: 96 MMHG | HEART RATE: 85 BPM | RESPIRATION RATE: 16 BRPM | WEIGHT: 195 LBS | SYSTOLIC BLOOD PRESSURE: 139 MMHG | OXYGEN SATURATION: 95 %

## 2022-05-17 DIAGNOSIS — G89.4 CHRONIC PAIN SYNDROME: Primary | ICD-10-CM

## 2022-05-17 DIAGNOSIS — M96.1 POSTLAMINECTOMY SYNDROME OF CERVICAL REGION: ICD-10-CM

## 2022-05-17 DIAGNOSIS — M46.1 SACROILIITIS: ICD-10-CM

## 2022-05-17 DIAGNOSIS — M54.12 CERVICAL RADICULITIS: ICD-10-CM

## 2022-05-17 DIAGNOSIS — M47.817 LUMBOSACRAL SPONDYLOSIS WITHOUT MYELOPATHY: ICD-10-CM

## 2022-05-17 PROCEDURE — 99214 OFFICE O/P EST MOD 30 MIN: CPT | Performed by: ANESTHESIOLOGY

## 2022-05-18 NOTE — PROGRESS NOTES
Subjective    CC back pain  Rita Prado is a 57 y.o. female with polyarthralgia S/P bilateral shoulder surgeries, chronic neck pain S/P ACDF Oct 2018/Dr. Tai, back pain, here for f/u.   Last seen several months ago.  Had 70 to 80% relief with right SI injection.  Presents  today with symptoms returning with low back right posterior hip/SI tenderness.  Chronic mid back/thoracic back pain associated with significant paraspinal muscle spasm and pain.  Chronic lower back pain radiating to bilateral lower extremity usually worse on the left with burning tingling numbness in the leg, worse with standing prolonged sitting or activity.  Denies weakness, bladder bowel continence.  Chronic neck pain from postlaminectomy syndrome with paraspinal muscle spasm radiating to bilateral shoulders.     Pain interfere with ADL/sleep and work.    Good relief of LESI, transforaminal UBADLO, cervical UBALDO.    Utilizes baclofen and tramadol prescribed by PCP with mild relief.    L-spine MRI reviewed 2021 showing moderate degenerative changes progressed from previous.  Notably disc bulge with superimposed small right paracentral disc extrusion traversing 3 mm inferiorly narrowing the right lateral recess and abutting the descending right S1 nerve root. Mild bilateral facet arthropathy. Mild spinal canal stenosis. Moderate to severe right and severe left neural foraminal stenosis.     C-spine MRI multilevel degenerative changes, slight retrolisthesis C5-C6 asymmetry to the left.  Multiple level foraminal narrowing.  Osteophyte complex..     Pain Assessment   Location of Pain: Lower Back, R Hip, L Hip, L Leg, neck pain, joint  Description of Pain: Dull/Aching, Throbbing, Stabbing  Previous Pain Rating :7  Current Pain Ratin  Aggravating Factors: Activity  Alleviating Factors: Rest, Medication    PEG Assessment   What number best describes your pain on average in the past week?7  What number best describes how, during the past week,  "pain has interfered with your enjoyment of life?6  What number best describes how, during the past week, pain has interfered with your general activity? 10     The following portions of the patient's history were reviewed and updated as appropriate: allergies, current medications, past family history, past medical history, past social history, past surgical history and problem list.     has a past medical history of GERD (gastroesophageal reflux disease), Hypertension, Low back pain, and Shoulder pain.   has a past surgical history that includes Rotator cuff repair; Shoulder surgery; and Neck surgery.  family history includes Alcohol abuse in her father; Cancer in her mother; Dementia in her father; Heart failure in her mother; Hypertension in her mother.  Social History     Tobacco Use   • Smoking status: Never Smoker   • Smokeless tobacco: Never Used   Substance Use Topics   • Alcohol use: Not Currently       Review of Systems   Musculoskeletal: Positive for back pain.        Left leg pain and weakness   All other systems reviewed and are negative.    Objective   Physical Exam  Vitals reviewed.   Constitutional:       General: She is not in acute distress.  Neck:      Meningeal: Kernig's sign present.   Pulmonary:      Effort: Pulmonary effort is normal.   Musculoskeletal:      Cervical back: Tenderness present. Decreased range of motion.      Thoracic back: Spasms and tenderness present.      Lumbar back: Spasms and tenderness present. Decreased range of motion. Positive left straight leg raise test.      Comments: Lumbar loading positive, pain on extension of low back past 5 degrees.  TTP on the lumbar facets noted.  Left lumbar paraspinal tenderness   Neurological:      Gait: Gait abnormal.      Comments: Antalgic gait, right dorsiflexion strength 4 out of 5.       /96   Pulse 85   Resp 16   Ht 166.4 cm (65.5\")   Wt 88.5 kg (195 lb)   SpO2 95%   BMI 31.96 kg/m²      PHQ 9 on chart  Opioid risk tool " low risk    Assessment & Plan   Diagnoses and all orders for this visit:    1. Chronic pain syndrome (Primary)    2. Postlaminectomy syndrome of cervical region    3. Sacroiliitis (HCC)  -     SI Joint Injection    4. Cervical radiculitis    5. Lumbosacral spondylosis without myelopathy    Summary  Rtia Prado is a 57 y.o. female with polyarthralgia S/P bilateral shoulder surgeries, chronic neck pain S/P cervical fusion Oct 2018/Dr. Tai, back pain, here for f/u.   Chronic pain from lumbar DDD spondylosis.  Chronic back pain postlaminectomy syndrome.    Last seen several months ago.  Had 70 to 80% relief with right SI injection.    Presents today with symptoms returning with low back right posterior hip/SI tenderness.  Schedule for repeat right SI injection.  Risk and benefit discussed.    Also complains of worsening axial neck pain radiating to both shoulders from DDD postlaminectomy syndrome.  Consider cervical UBALDO.    RTC for procedure

## 2022-05-19 ENCOUNTER — TELEPHONE (OUTPATIENT)
Dept: PAIN MEDICINE | Facility: CLINIC | Age: 57
End: 2022-05-19

## 2022-05-19 NOTE — TELEPHONE ENCOUNTER
Caller: Rita Prado    Relationship to patient: Self    Best call back number: [phone]    Type of visit: SI JOINT INJECTION    Requested date: ASAP    Additional notes: PATIENT WAS CALLING TO SEE IF THERE WERE ANY CANCELLATIONS FOR INJECTION FOR TODAY.

## 2022-05-23 ENCOUNTER — APPOINTMENT (OUTPATIENT)
Dept: PAIN MEDICINE | Facility: HOSPITAL | Age: 57
End: 2022-05-23

## 2022-06-10 ENCOUNTER — APPOINTMENT (OUTPATIENT)
Dept: PAIN MEDICINE | Facility: HOSPITAL | Age: 57
End: 2022-06-10

## 2022-06-24 ENCOUNTER — HOSPITAL ENCOUNTER (OUTPATIENT)
Dept: PAIN MEDICINE | Facility: HOSPITAL | Age: 57
Discharge: HOME | End: 2022-06-24

## 2022-06-24 VITALS
BODY MASS INDEX: 31.34 KG/M2 | HEART RATE: 79 BPM | WEIGHT: 195 LBS | RESPIRATION RATE: 16 BRPM | DIASTOLIC BLOOD PRESSURE: 89 MMHG | TEMPERATURE: 97.5 F | HEIGHT: 66 IN | SYSTOLIC BLOOD PRESSURE: 126 MMHG | OXYGEN SATURATION: 93 %

## 2022-06-24 DIAGNOSIS — R52 PAIN: ICD-10-CM

## 2022-06-24 DIAGNOSIS — M46.1 SACROILIITIS: Primary | ICD-10-CM

## 2022-06-24 DIAGNOSIS — M54.14 THORACIC NEURITIS: ICD-10-CM

## 2022-06-24 PROCEDURE — 77003 FLUOROGUIDE FOR SPINE INJECT: CPT

## 2022-06-24 PROCEDURE — 25010000002 METHYLPREDNISOLONE PER 40 MG: Performed by: ANESTHESIOLOGY

## 2022-06-24 PROCEDURE — 27096 INJECT SACROILIAC JOINT: CPT | Performed by: ANESTHESIOLOGY

## 2022-06-24 PROCEDURE — 0 IOPAMIDOL 41 % SOLUTION: Performed by: ANESTHESIOLOGY

## 2022-06-24 RX ORDER — BUPIVACAINE HYDROCHLORIDE 2.5 MG/ML
10 INJECTION, SOLUTION EPIDURAL; INFILTRATION; INTRACAUDAL ONCE
Status: COMPLETED | OUTPATIENT
Start: 2022-06-24 | End: 2022-06-24

## 2022-06-24 RX ORDER — METHYLPREDNISOLONE ACETATE 40 MG/ML
40 INJECTION, SUSPENSION INTRA-ARTICULAR; INTRALESIONAL; INTRAMUSCULAR; SOFT TISSUE ONCE
Status: COMPLETED | OUTPATIENT
Start: 2022-06-24 | End: 2022-06-24

## 2022-06-24 RX ADMIN — METHYLPREDNISOLONE ACETATE 40 MG: 40 INJECTION, SUSPENSION INTRA-ARTICULAR; INTRALESIONAL; INTRAMUSCULAR; INTRASYNOVIAL; SOFT TISSUE at 11:45

## 2022-06-24 RX ADMIN — IOPAMIDOL 3 ML: 408 INJECTION, SOLUTION INTRATHECAL at 11:45

## 2022-06-24 RX ADMIN — BUPIVACAINE HYDROCHLORIDE 10 ML: 2.5 INJECTION, SOLUTION EPIDURAL; INFILTRATION; INTRACAUDAL; PERINEURAL at 11:45

## 2022-06-24 NOTE — PROCEDURES
"Subjective    CC back pain  Rita Prado is a 57 y.o. female with right sacroiliitis here for repeat right SI injection.. No anticoagulation    Pain Assessment   Location of Pain: Lower Back, R Hip, L Hip, R Leg,   Description of Pain: Dull/Aching, Throbbing, Stabbing  Previous Pain Rating :7  Current Pain Ratin  Aggravating Factors: Activity  Alleviating Factors: Rest, Medication    The following portions of the patient's history were reviewed and updated as appropriate: allergies, current medications, past family history, past medical history, past social history, past surgical history and problem list.    Review of Systems  As in HPI  Objective   Physical Exam  Constitutional:       General: She is not in acute distress.     Appearance: She is well-developed.   Cardiovascular:      Rate and Rhythm: Normal rate.   Pulmonary:      Effort: Pulmonary effort is normal.   Musculoskeletal:      Lumbar back: Tenderness present. Decreased range of motion.   Neurological:      Mental Status: She is alert and oriented to person, place, and time.       /89 (BP Location: Left arm, Patient Position: Sitting)   Pulse 79   Temp 97.5 °F (36.4 °C) (Skin)   Resp 16   Ht 166.4 cm (65.5\")   Wt 88.5 kg (195 lb)   SpO2 93%   BMI 31.96 kg/m²     Assessment & Plan    underwent repeat  right SI injection.    RTC as needed.    DATE OF PROCEDURE:  2022    PREOPERATIVE DIAGNOSIS: sacroiliitis    POSTOPERATIVE DIAGNOSIS: same    PROCEDURE PERFORMED: Right SACROILIAC JOINT INJECTION    The patient understands the risks and benefits of the procedure and wishes to proceed. The patient was seen in the preoperative area. Patient's consent was obtained and updated. Vitals were taken. Patient was then brought to the procedure suite and placed in prone position for sacroiliac joint injection. The appropriate anatomic area was widely prepped with Chloraprep and draped in a sterile fashion. Noninvasive monitoring per routine " anesthesia protocol was placed. Under fluoroscopic guidance using an AP view, a 22 gauge curved tip spinal needle was passed through skin anesthetized with 1% Lidocaine without epinephrine. The needle tip was guided to the lower pole of the joint using fluoroscopy. 1 mL of  preservative free contrast was injected into the joint to confirm location. A clear outline was obtained and 5 mL of steroid solution containing 4 mL 0.25% bupivacaine, and 1mL 40mg Depomedrol was injected. The patient tolerated with no keke-procedural complications.  A sterile dressing was placed over the puncture sites.

## 2022-09-02 ENCOUNTER — HOSPITAL ENCOUNTER (OUTPATIENT)
Dept: PAIN MEDICINE | Facility: HOSPITAL | Age: 57
Discharge: HOME | End: 2022-09-02

## 2022-09-02 VITALS
RESPIRATION RATE: 16 BRPM | OXYGEN SATURATION: 99 % | BODY MASS INDEX: 32.47 KG/M2 | HEART RATE: 90 BPM | HEIGHT: 66 IN | TEMPERATURE: 97.3 F | SYSTOLIC BLOOD PRESSURE: 137 MMHG | DIASTOLIC BLOOD PRESSURE: 110 MMHG | WEIGHT: 202 LBS

## 2022-09-02 DIAGNOSIS — R52 PAIN: ICD-10-CM

## 2022-09-02 DIAGNOSIS — M54.14 THORACIC NEURITIS: ICD-10-CM

## 2022-09-02 PROCEDURE — 0 IOPAMIDOL 41 % SOLUTION: Performed by: ANESTHESIOLOGY

## 2022-09-02 PROCEDURE — 62321 NJX INTERLAMINAR CRV/THRC: CPT | Performed by: ANESTHESIOLOGY

## 2022-09-02 PROCEDURE — 77003 FLUOROGUIDE FOR SPINE INJECT: CPT

## 2022-09-02 PROCEDURE — 25010000002 METHYLPREDNISOLONE PER 40 MG: Performed by: ANESTHESIOLOGY

## 2022-09-02 RX ORDER — METHYLPREDNISOLONE ACETATE 40 MG/ML
40 INJECTION, SUSPENSION INTRA-ARTICULAR; INTRALESIONAL; INTRAMUSCULAR; SOFT TISSUE ONCE
Status: COMPLETED | OUTPATIENT
Start: 2022-09-02 | End: 2022-09-02

## 2022-09-02 RX ADMIN — METHYLPREDNISOLONE ACETATE 40 MG: 40 INJECTION, SUSPENSION INTRA-ARTICULAR; INTRALESIONAL; INTRAMUSCULAR; INTRASYNOVIAL; SOFT TISSUE at 09:30

## 2022-09-02 RX ADMIN — IOPAMIDOL 3 ML: 408 INJECTION, SOLUTION INTRATHECAL at 09:30

## 2022-09-02 NOTE — DISCHARGE INSTRUCTIONS

## 2022-09-02 NOTE — PROCEDURES
"Subjective    CC back pain  Rtia Prado is a 57 y.o. female with thoracic radiculitis here for thoracic UBALDO.  No anticoagulation    Pain Assessment   Location of Pain: Lower Back, R Hip, L Hip, R Leg,   Description of Pain: Dull/Aching, Throbbing, Stabbing  Previous Pain Rating :7  Current Pain Ratin  Aggravating Factors: Activity  Alleviating Factors: Rest, Medication    The following portions of the patient's history were reviewed and updated as appropriate: allergies, current medications, past family history, past medical history, past social history, past surgical history and problem list.    Review of Systems  As in HPI  Objective   Physical Exam  Constitutional:       General: She is not in acute distress.     Appearance: She is well-developed.   Cardiovascular:      Rate and Rhythm: Normal rate.   Pulmonary:      Effort: Pulmonary effort is normal.   Musculoskeletal:      Lumbar back: Tenderness present. Decreased range of motion.   Neurological:      Mental Status: She is alert and oriented to person, place, and time.       BP (!) 137/110 (BP Location: Left arm, Patient Position: Sitting)   Pulse 90   Temp 97.3 °F (36.3 °C) (Skin)   Resp 16   Ht 166.4 cm (65.5\")   Wt 91.6 kg (202 lb)   SpO2 99%   BMI 33.10 kg/m²     Assessment & Plan    underwent thoracic UBALDO.    RTC as needed.    DATE OF PROCEDURE:  2022    PREOPERATIVE DIAGNOSIS: Thoracic radiculitis    POSTOPERATIVE DIAGNOSIS: same    PROCEDURE PERFORMED: Thoracic epidural Steroid Injection    The patient presents with a history of   cervical degenerative disc disease  with  radiculitis. The patient presents today for a  cervical  epidural steroid injection at level T10/11 The patient understands the risks and benefits of the procedure and wishes to proceed.  The patient was seen in the preoperative area.  Patient's consent was obtained and updated.  Vitals were taken.  Patient was then brought to the procedure suite and placed in a " prone position. The appropriate anatomic area was widely prepped with Chloraprep and draped in a sterile fashion. Noninvasive monitoring per routine anesthesia protocol was placed.  Under fluoroscopic guidance using  AP view a 20 gauge styleted tuohy needle was passed through skin anesthetized with 1% Lidocaine without epinephrine.  The needle was advanced using the continuous loss of resistance to saline technique into the T10  epidural space. Needle tip placement in the epidural space was confirmed by loss of resistance and injection of 1 mL of  preservative free contrast.  Following this 7 mL of a solution containing  1 mL of 40 mg Depo-Medrol and 7 mL of preservative-free saline was carefully administered in the epidural space. Epidurogram following injection demonstrated dye spread as high as T8 and as low as T12. A sterile dressing was placed over the puncture site.    The patient tolerated the procedure with no complications . They were then brought to the post procedure area where they recovered nicely.

## 2022-11-03 ENCOUNTER — OFFICE VISIT (OUTPATIENT)
Dept: PAIN MEDICINE | Facility: CLINIC | Age: 57
End: 2022-11-03

## 2022-11-03 VITALS
RESPIRATION RATE: 16 BRPM | HEART RATE: 112 BPM | OXYGEN SATURATION: 94 % | SYSTOLIC BLOOD PRESSURE: 159 MMHG | DIASTOLIC BLOOD PRESSURE: 111 MMHG

## 2022-11-03 DIAGNOSIS — G89.4 CHRONIC PAIN SYNDROME: Primary | ICD-10-CM

## 2022-11-03 DIAGNOSIS — M46.1 SACROILIITIS: ICD-10-CM

## 2022-11-03 DIAGNOSIS — M47.817 LUMBOSACRAL SPONDYLOSIS WITHOUT MYELOPATHY: ICD-10-CM

## 2022-11-03 DIAGNOSIS — Z79.899 HIGH RISK MEDICATION USE: Primary | ICD-10-CM

## 2022-11-03 DIAGNOSIS — M96.1 POSTLAMINECTOMY SYNDROME OF CERVICAL REGION: ICD-10-CM

## 2022-11-03 DIAGNOSIS — M54.16 LUMBAR RADICULITIS: ICD-10-CM

## 2022-11-03 DIAGNOSIS — Z79.899 HIGH RISK MEDICATION USE: ICD-10-CM

## 2022-11-03 DIAGNOSIS — M54.12 CERVICAL RADICULITIS: ICD-10-CM

## 2022-11-03 PROCEDURE — 99214 OFFICE O/P EST MOD 30 MIN: CPT | Performed by: ANESTHESIOLOGY

## 2022-11-03 RX ORDER — HYDROCODONE BITARTRATE AND ACETAMINOPHEN 10; 325 MG/1; MG/1
1 TABLET ORAL 3 TIMES DAILY PRN
Qty: 21 TABLET | Refills: 0 | Status: SHIPPED | OUTPATIENT
Start: 2022-11-03 | End: 2022-11-11 | Stop reason: SDUPTHER

## 2022-11-03 RX ORDER — CELECOXIB 200 MG/1
CAPSULE ORAL
COMMUNITY
Start: 2022-11-01

## 2022-11-03 RX ORDER — DIAZEPAM 10 MG/1
TABLET ORAL
COMMUNITY

## 2022-11-03 RX ORDER — METOPROLOL TARTRATE 50 MG/1
TABLET, FILM COATED ORAL
COMMUNITY
Start: 2022-11-01

## 2022-11-03 RX ORDER — TOPIRAMATE 50 MG/1
CAPSULE, EXTENDED RELEASE ORAL
COMMUNITY

## 2022-11-03 RX ORDER — FAMOTIDINE 40 MG/1
TABLET, FILM COATED ORAL
COMMUNITY
Start: 2022-11-01

## 2022-11-03 NOTE — PROGRESS NOTES
Subjective    CC back pain  Rita Prado is a 57 y.o. female with polyarthralgia S/P bilateral shoulder surgeries, chronic neck pain S/P ACDF Oct 2018/Dr. Tai, back pain, here for f/u.   Thoracic UBALDO last visit reports marginal relief.  Today complains of severe right-sided back pain radiating to buttock posterior thigh and groin, constant but worse with standing walking.  Started a week ago and has progressively gotten worse.  She had tried tramadol from PCP without relief.  Chronic mid back/thoracic back pain associated with significant paraspinal muscle spasm and pain.  Chronic lower back pain radiating to bilateral lower extremity usually worse on the left with burning tingling numbness in the leg, worse with standing prolonged sitting or activity.  Denies weakness, bladder bowel continence.  Chronic neck pain from postlaminectomy syndrome with paraspinal muscle spasm radiating to bilateral shoulders.     Pain interfere with ADL/sleep and work.    Good relief of LESI, transforaminal UBALDO, cervical UBALDO.    Utilizes baclofen and tramadol prescribed by PCP with mild relief.    L-spine MRI reviewed 5/2021 showing moderate degenerative changes progressed from previous.  Notably disc bulge with superimposed small right paracentral disc extrusion traversing 3 mm inferiorly narrowing the right lateral recess and abutting the descending right S1 nerve root. Mild bilateral facet arthropathy. Mild spinal canal stenosis. Moderate to severe right and severe left neural foraminal stenosis.     C-spine MRI multilevel degenerative changes, slight retrolisthesis C5-C6 asymmetry to the left.  Multiple level foraminal narrowing.  Osteophyte complex..     Pain Assessment   Location of Pain: Lower Back, R Hip, L Hip, L Leg, neck pain, joint  Description of Pain: Dull/Aching, Throbbing, Stabbing  Previous Pain Rating :7  Current Pain Rating: 10  Aggravating Factors: Activity  Alleviating Factors: Rest, Medication    PEG Assessment    What number best describes your pain on average in the past week?7  What number best describes how, during the past week, pain has interfered with your enjoyment of life?8  What number best describes how, during the past week, pain has interfered with your general activity? 10     The following portions of the patient's history were reviewed and updated as appropriate: allergies, current medications, past family history, past medical history, past social history, past surgical history and problem list.     has a past medical history of GERD (gastroesophageal reflux disease), Hypertension, Low back pain, and Shoulder pain.   has a past surgical history that includes Rotator cuff repair; Shoulder surgery; and Neck surgery.  family history includes Alcohol abuse in her father; Cancer in her mother; Dementia in her father; Heart failure in her mother; Hypertension in her mother.  Social History     Tobacco Use   • Smoking status: Never   • Smokeless tobacco: Never   Substance Use Topics   • Alcohol use: Not Currently       Review of Systems   Musculoskeletal: Positive for back pain.        Left leg pain and weakness   All other systems reviewed and are negative.    Objective   Physical Exam  Vitals reviewed.   Constitutional:       General: She is not in acute distress.  Neck:      Meningeal: Kernig's sign present.   Pulmonary:      Effort: Pulmonary effort is normal.   Musculoskeletal:      Cervical back: Tenderness present. Decreased range of motion.      Thoracic back: Spasms and tenderness present.      Lumbar back: Spasms and tenderness present. Decreased range of motion. Positive right straight leg raise test.      Comments: Lumbar loading positive, pain on extension of low back past 5 degrees.  TTP on the lumbar facets noted.  Left lumbar paraspinal tenderness   Neurological:      Gait: Gait abnormal.      Comments: Antalgic gait, right dorsiflexion strength 4 out of 5.       BP (!) 159/111 Comment: standing   Pulse 112   Resp 16   SpO2 94%      PHQ 9 on chart  Opioid risk tool low risk    Assessment & Plan   Diagnoses and all orders for this visit:    1. Chronic pain syndrome (Primary)  -     HYDROcodone-acetaminophen (NORCO)  MG per tablet; Take 1 tablet by mouth 3 (Three) Times a Day As Needed for Severe Pain.  Dispense: 21 tablet; Refill: 0    2. Postlaminectomy syndrome of cervical region    3. Cervical radiculitis    4. Lumbosacral spondylosis without myelopathy    5. Lumbar radiculitis  -     Epidural Block    6. Sacroiliitis (HCC)    7. High risk medication use    Summary  Rita Prado is a 57 y.o. female with polyarthralgia S/P bilateral shoulder surgeries, chronic neck pain S/P cervical fusion Oct 2018/Dr. Tai, back pain, here for f/u.   Chronic pain from lumbar DDD spondylosis.  Chronic back pain postlaminectomy syndrome.    Thoracic UBALDO last visit reports marginal relief.  Today complains of severe right-sided back pain radiating to buttock posterior thigh and groin, constant but worse with standing walking.  Started a week ago and has progressively gotten worse.  She had tried tramadol from PCP without relief    This consistent with acute exacerbation of chronic back pain with right lower extremity radicular pain.  We will schedule for LESI (right parasagittal).  Risk and benefits discussed.    Change tramadol to hydrocodone 10/325 2-3 times daily as needed for severe pain.  UDS sent.  Inspect reviewed.  Controlled substance agreement signed  Discussed risk of tolerance, dependence, respiratory depression, coma and death associated with use of oral opioids for treatment of chronic nonmalignant pain.       RTC for procedure

## 2022-11-10 ENCOUNTER — TELEPHONE (OUTPATIENT)
Dept: PAIN MEDICINE | Facility: CLINIC | Age: 57
End: 2022-11-10

## 2022-11-10 NOTE — TELEPHONE ENCOUNTER
Caller: BEULAH LAURENT    Relationship to patient: SELF     Best call back number:  Patient is needing: PATIENT WANTS TO KNOW IF SHE CAN GET PRESCRIBED STEROIDS FOR INFLAMATION- PATIENT STATES THAT SHE IS ALMOST OVER HER 7 DAYS AS WELL FOR THE PAIN MEDS

## 2022-11-11 DIAGNOSIS — G89.4 CHRONIC PAIN SYNDROME: ICD-10-CM

## 2022-11-11 RX ORDER — HYDROCODONE BITARTRATE AND ACETAMINOPHEN 10; 325 MG/1; MG/1
1 TABLET ORAL 3 TIMES DAILY PRN
Qty: 90 TABLET | Refills: 0 | Status: SHIPPED | OUTPATIENT
Start: 2022-11-11 | End: 2022-11-28 | Stop reason: SDUPTHER

## 2022-11-11 NOTE — TELEPHONE ENCOUNTER
Refilled hydrocodone.  Will give steroids in the injection.  Can be placed on cancellation list to move procedure earlier if needed.

## 2022-11-28 ENCOUNTER — HOSPITAL ENCOUNTER (OUTPATIENT)
Dept: PAIN MEDICINE | Facility: HOSPITAL | Age: 57
Discharge: HOME | End: 2022-11-28

## 2022-11-28 VITALS
WEIGHT: 197 LBS | RESPIRATION RATE: 16 BRPM | HEART RATE: 89 BPM | SYSTOLIC BLOOD PRESSURE: 139 MMHG | HEIGHT: 66 IN | BODY MASS INDEX: 31.66 KG/M2 | OXYGEN SATURATION: 97 % | DIASTOLIC BLOOD PRESSURE: 105 MMHG | TEMPERATURE: 97.5 F

## 2022-11-28 DIAGNOSIS — R52 PAIN: ICD-10-CM

## 2022-11-28 DIAGNOSIS — M54.16 LUMBAR RADICULITIS: Primary | ICD-10-CM

## 2022-11-28 DIAGNOSIS — G89.4 CHRONIC PAIN SYNDROME: ICD-10-CM

## 2022-11-28 PROCEDURE — 77003 FLUOROGUIDE FOR SPINE INJECT: CPT

## 2022-11-28 PROCEDURE — 0 IOPAMIDOL 41 % SOLUTION: Performed by: ANESTHESIOLOGY

## 2022-11-28 PROCEDURE — 25010000002 METHYLPREDNISOLONE PER 40 MG: Performed by: ANESTHESIOLOGY

## 2022-11-28 PROCEDURE — 62323 NJX INTERLAMINAR LMBR/SAC: CPT | Performed by: ANESTHESIOLOGY

## 2022-11-28 RX ORDER — HYDROCODONE BITARTRATE AND ACETAMINOPHEN 10; 325 MG/1; MG/1
1 TABLET ORAL 3 TIMES DAILY PRN
Qty: 90 TABLET | Refills: 0 | Status: SHIPPED | OUTPATIENT
Start: 2022-12-10 | End: 2022-12-14 | Stop reason: SDUPTHER

## 2022-11-28 RX ORDER — DULAGLUTIDE 0.75 MG/.5ML
INJECTION, SOLUTION SUBCUTANEOUS
COMMUNITY
Start: 2022-11-01 | End: 2023-03-09 | Stop reason: ALTCHOICE

## 2022-11-28 RX ORDER — METHYLPREDNISOLONE ACETATE 40 MG/ML
40 INJECTION, SUSPENSION INTRA-ARTICULAR; INTRALESIONAL; INTRAMUSCULAR; SOFT TISSUE ONCE
Status: COMPLETED | OUTPATIENT
Start: 2022-11-28 | End: 2022-11-28

## 2022-11-28 RX ORDER — BUPIVACAINE HYDROCHLORIDE 2.5 MG/ML
10 INJECTION, SOLUTION EPIDURAL; INFILTRATION; INTRACAUDAL ONCE
Status: COMPLETED | OUTPATIENT
Start: 2022-11-28 | End: 2022-11-28

## 2022-11-28 RX ORDER — LIDOCAINE HYDROCHLORIDE 10 MG/ML
5 INJECTION, SOLUTION EPIDURAL; INFILTRATION; INTRACAUDAL; PERINEURAL ONCE
Status: COMPLETED | OUTPATIENT
Start: 2022-11-28 | End: 2022-11-28

## 2022-11-28 RX ADMIN — IOPAMIDOL 3 ML: 408 INJECTION, SOLUTION INTRATHECAL at 12:04

## 2022-11-28 RX ADMIN — METHYLPREDNISOLONE ACETATE 40 MG: 40 INJECTION, SUSPENSION INTRA-ARTICULAR; INTRALESIONAL; INTRAMUSCULAR; INTRASYNOVIAL; SOFT TISSUE at 12:04

## 2022-11-28 RX ADMIN — LIDOCAINE HYDROCHLORIDE 5 ML: 10 INJECTION, SOLUTION EPIDURAL; INFILTRATION; INTRACAUDAL; PERINEURAL at 12:01

## 2022-11-28 RX ADMIN — BUPIVACAINE HYDROCHLORIDE 3 ML: 2.5 INJECTION, SOLUTION EPIDURAL; INFILTRATION; INTRACAUDAL; PERINEURAL at 12:04

## 2022-11-28 NOTE — DISCHARGE INSTRUCTIONS

## 2022-11-28 NOTE — ADDENDUM NOTE
Encounter addended by: Yumiko Beckham RN on: 11/28/2022 2:03 PM   Actions taken: MAR administration accepted

## 2022-11-28 NOTE — PROCEDURES
"Subjective    CC back pain  Rita Prado is a 57 y.o. female with lumbosacral radiculitis here for caudal UBALDO.  No anticoagulation    Pain Assessment   Location of Pain: Lower Back, R Hip, L Hip, R Leg,   Description of Pain: Dull/Aching, Throbbing, Stabbing  Previous Pain Rating :7  Current Pain Ratin  Aggravating Factors: Activity  Alleviating Factors: Rest, Medication    The following portions of the patient's history were reviewed and updated as appropriate: allergies, current medications, past family history, past medical history, past social history, past surgical history and problem list.    Review of Systems  As in HPI  Objective   Physical Exam  Constitutional:       General: She is not in acute distress.     Appearance: She is well-developed.   Cardiovascular:      Rate and Rhythm: Normal rate.   Pulmonary:      Effort: Pulmonary effort is normal.   Musculoskeletal:      Lumbar back: Tenderness present. Decreased range of motion.   Neurological:      Mental Status: She is alert and oriented to person, place, and time.       /94 (BP Location: Left arm, Patient Position: Sitting)   Pulse 92   Temp 97.5 °F (36.4 °C) (Skin)   Resp 16   Ht 166.4 cm (65.5\")   Wt 89.4 kg (197 lb)   SpO2 97%   BMI 32.28 kg/m²     Assessment & Plan    underwent caudal UBALDO.    RTC as needed.    DATE OF PROCEDURE: 2022    PREOPERATIVE DIAGNOSIS:  lumbosacral DDD and radiculitis    POSTOPERATIVE DIAGNOSIS: Same    PROCEDURE PERFORMED: Caudal Epidural Steroid Injection    The patient presents with a history of  lumbosacral degenerative disc disease with lumbosacral neuritis. The patient presents today for a caudal epidural steroid injection. The patient understands the risks and benefits of the procedure and wishes to proceed. The patient was seen in the preoperative area.  Patient's consent was obtained and updated.  Vitals were taken.  Patient was then brought to the procedure suite and placed in a prone " position. The appropriate anatomic area was widely prepped with  Chloraprep and draped in a sterile fashion. Noninvasive monitoring per routine anesthesia protocol was placed.  Under fluoroscopic guidance using a lateral view a 22 guage curved spinal needle was passed through skin anesthesized with 1% Lidocaine without epinephrine.  The needle was advanced through the sacral hiatus and into the sacral epidural space using fluoroscopic guidance. Needle tip placement in the epidural space was confirmed by loss of resistance and injection of  1.5  mL of  preservative free contrast. Following this 10 mL of a solution containing  1 mL of 40 mg Depo-Medrol,  1 mL of  0.25% bupivacaine and 8 mL of preservative-free saline was carefully administered in the epidural space.   A sterile dressing was placed over the puncture site.    The patient tolerated the procedure with  no complications. They were then brought to the post procedure area where they recovered nicely.     Discharge:  The patient will be discharged home in stable condition.   Patient understands to contact the Center with any post procedure questions or concerns.  Discharge instructions given by nursing staff.

## 2022-11-29 ENCOUNTER — TELEPHONE (OUTPATIENT)
Dept: PAIN MEDICINE | Facility: HOSPITAL | Age: 57
End: 2022-11-29

## 2022-11-29 NOTE — TELEPHONE ENCOUNTER
HUB AGENT ATTEMPTED CLINICAL WARM TRANSFER - NO ANSWER     Caller: Rita Prado    Relationship: Self    Best call back number: [phone]     What was the call regarding: PATIENT RETURNED MISSED CALL FROM BARRERA TANNER RE: YESTERDAY's 11-28-22 LUMBAR EPIDURAL     Do you require a callback: AS NEEDED - PATIENT AWARE VMAIL WAS NOT LEFT SINCE HOME ANSWERING MACHINE DOES NOT IDENTIFY PATIENT     THANKS

## 2022-12-05 ENCOUNTER — TELEPHONE (OUTPATIENT)
Dept: PAIN MEDICINE | Facility: CLINIC | Age: 57
End: 2022-12-05

## 2022-12-05 NOTE — TELEPHONE ENCOUNTER
Caller: BEULAH LAURENT    Relationship to patient: PATIENT     Best call back number:[phone]      Type of visit: 6 WEEK FOLLOW UP    Requested date: NEEDED EARLY AM APPOINTMENT, RESCHEDULED 01/18/23   If rescheduling, when is the original appointment: 01/12/23 2:10    Additional notes: PATIENT IS OKAY WITH THE 01/18/23 APPOINTMENT, WANTS TO BE SURE IT IS OKAY WITH DR GILLESPIE TO MOVE IT OUT A WEEK ?  THANK YOU

## 2022-12-14 DIAGNOSIS — G89.4 CHRONIC PAIN SYNDROME: ICD-10-CM

## 2022-12-14 RX ORDER — HYDROCODONE BITARTRATE AND ACETAMINOPHEN 10; 325 MG/1; MG/1
1 TABLET ORAL 3 TIMES DAILY PRN
Qty: 90 TABLET | Refills: 0 | Status: SHIPPED | OUTPATIENT
Start: 2022-12-14 | End: 2023-01-18 | Stop reason: SDUPTHER

## 2023-01-18 ENCOUNTER — OFFICE VISIT (OUTPATIENT)
Dept: PAIN MEDICINE | Facility: CLINIC | Age: 58
End: 2023-01-18
Payer: COMMERCIAL

## 2023-01-18 VITALS
DIASTOLIC BLOOD PRESSURE: 100 MMHG | HEART RATE: 96 BPM | OXYGEN SATURATION: 95 % | SYSTOLIC BLOOD PRESSURE: 138 MMHG | RESPIRATION RATE: 16 BRPM

## 2023-01-18 DIAGNOSIS — M54.16 LUMBAR RADICULITIS: ICD-10-CM

## 2023-01-18 DIAGNOSIS — M96.1 POSTLAMINECTOMY SYNDROME OF CERVICAL REGION: ICD-10-CM

## 2023-01-18 DIAGNOSIS — G89.4 CHRONIC PAIN SYNDROME: ICD-10-CM

## 2023-01-18 DIAGNOSIS — M47.817 LUMBOSACRAL SPONDYLOSIS WITHOUT MYELOPATHY: ICD-10-CM

## 2023-01-18 DIAGNOSIS — Z79.899 HIGH RISK MEDICATION USE: Primary | ICD-10-CM

## 2023-01-18 PROCEDURE — 99214 OFFICE O/P EST MOD 30 MIN: CPT | Performed by: ANESTHESIOLOGY

## 2023-01-18 RX ORDER — HYDROCODONE BITARTRATE AND ACETAMINOPHEN 10; 325 MG/1; MG/1
1 TABLET ORAL 3 TIMES DAILY PRN
Qty: 90 TABLET | Refills: 0 | Status: SHIPPED | OUTPATIENT
Start: 2023-01-18 | End: 2023-03-09 | Stop reason: SDUPTHER

## 2023-01-18 RX ORDER — HYDROCODONE BITARTRATE AND ACETAMINOPHEN 10; 325 MG/1; MG/1
1 TABLET ORAL 3 TIMES DAILY PRN
Qty: 90 TABLET | Refills: 0 | Status: SHIPPED | OUTPATIENT
Start: 2023-02-17 | End: 2023-03-09 | Stop reason: SDUPTHER

## 2023-02-03 ENCOUNTER — HOSPITAL ENCOUNTER (OUTPATIENT)
Dept: PAIN MEDICINE | Facility: HOSPITAL | Age: 58
Discharge: HOME | End: 2023-02-03
Payer: COMMERCIAL

## 2023-02-03 VITALS
WEIGHT: 197 LBS | SYSTOLIC BLOOD PRESSURE: 137 MMHG | TEMPERATURE: 97.3 F | HEART RATE: 90 BPM | OXYGEN SATURATION: 91 % | BODY MASS INDEX: 31.66 KG/M2 | RESPIRATION RATE: 16 BRPM | HEIGHT: 66 IN | DIASTOLIC BLOOD PRESSURE: 99 MMHG

## 2023-02-03 DIAGNOSIS — R52 PAIN: ICD-10-CM

## 2023-02-03 DIAGNOSIS — M54.16 LUMBAR RADICULITIS: Primary | ICD-10-CM

## 2023-02-03 PROCEDURE — 25010000002 METHYLPREDNISOLONE PER 40 MG: Performed by: ANESTHESIOLOGY

## 2023-02-03 PROCEDURE — 0 IOPAMIDOL 41 % SOLUTION: Performed by: ANESTHESIOLOGY

## 2023-02-03 PROCEDURE — 77003 FLUOROGUIDE FOR SPINE INJECT: CPT

## 2023-02-03 PROCEDURE — 62323 NJX INTERLAMINAR LMBR/SAC: CPT | Performed by: ANESTHESIOLOGY

## 2023-02-03 RX ORDER — METHYLPREDNISOLONE ACETATE 40 MG/ML
40 INJECTION, SUSPENSION INTRA-ARTICULAR; INTRALESIONAL; INTRAMUSCULAR; SOFT TISSUE ONCE
Status: COMPLETED | OUTPATIENT
Start: 2023-02-03 | End: 2023-02-03

## 2023-02-03 RX ORDER — BUPIVACAINE HYDROCHLORIDE 2.5 MG/ML
10 INJECTION, SOLUTION EPIDURAL; INFILTRATION; INTRACAUDAL ONCE
Status: COMPLETED | OUTPATIENT
Start: 2023-02-03 | End: 2023-02-03

## 2023-02-03 RX ADMIN — METHYLPREDNISOLONE ACETATE 40 MG: 40 INJECTION, SUSPENSION INTRA-ARTICULAR; INTRALESIONAL; INTRAMUSCULAR; INTRASYNOVIAL; SOFT TISSUE at 08:55

## 2023-02-03 RX ADMIN — IOPAMIDOL 1 ML: 408 INJECTION, SOLUTION INTRATHECAL at 08:54

## 2023-02-03 RX ADMIN — BUPIVACAINE HYDROCHLORIDE 3 ML: 2.5 INJECTION, SOLUTION EPIDURAL; INFILTRATION; INTRACAUDAL; PERINEURAL at 08:55

## 2023-02-03 NOTE — PROCEDURES
"Subjective    CC back pain  Rita Prado is a 57 y.o. female with lumbosacral radiculitis here for repeat  caudal UBALDO.  No anticoagulation    Pain Assessment   Location of Pain: Lower Back, R Hip, L Hip, R Leg,   Description of Pain: Dull/Aching, Throbbing, Stabbing  Previous Pain Rating :7  Current Pain Ratin  Aggravating Factors: Activity  Alleviating Factors: Rest, Medication    The following portions of the patient's history were reviewed and updated as appropriate: allergies, current medications, past family history, past medical history, past social history, past surgical history and problem list.    Review of Systems  As in HPI  Objective   Physical Exam  Constitutional:       General: She is not in acute distress.     Appearance: She is well-developed.   Cardiovascular:      Rate and Rhythm: Normal rate.   Pulmonary:      Effort: Pulmonary effort is normal.   Musculoskeletal:      Lumbar back: Tenderness present. Decreased range of motion.   Neurological:      Mental Status: She is alert and oriented to person, place, and time.       /99 (BP Location: Left arm, Patient Position: Sitting)   Pulse 90   Temp 97.3 °F (36.3 °C) (Skin)   Resp 16   Ht 166.4 cm (65.5\")   Wt 89.4 kg (197 lb)   SpO2 91%   BMI 32.28 kg/m²     Assessment & Plan    underwent repeat caudal UBALDO.    RTC as needed.    DATE OF PROCEDURE: 2/3/2023    PREOPERATIVE DIAGNOSIS:  lumbosacral DDD and radiculitis    POSTOPERATIVE DIAGNOSIS: Same    PROCEDURE PERFORMED: Caudal Epidural Steroid Injection    The patient presents with a history of  lumbosacral degenerative disc disease with lumbosacral neuritis. The patient presents today for a caudal epidural steroid injection. The patient understands the risks and benefits of the procedure and wishes to proceed. The patient was seen in the preoperative area.  Patient's consent was obtained and updated.  Vitals were taken.  Patient was then brought to the procedure suite and placed in " a prone position. The appropriate anatomic area was widely prepped with  Chloraprep and draped in a sterile fashion. Noninvasive monitoring per routine anesthesia protocol was placed.  Under fluoroscopic guidance using a lateral view a 22 guage curved spinal needle was passed through skin anesthesized with 1% Lidocaine without epinephrine.  The needle was advanced through the sacral hiatus and into the sacral epidural space using fluoroscopic guidance. Needle tip placement in the epidural space was confirmed by loss of resistance and injection of  1.5  mL of  preservative free contrast. Following this 10 mL of a solution containing  1 mL of 40 mg Depo-Medrol,  1 mL of  0.25% bupivacaine and 8 mL of preservative-free saline was carefully administered in the epidural space.   A sterile dressing was placed over the puncture site.    The patient tolerated the procedure with  no complications. They were then brought to the post procedure area where they recovered nicely.     Discharge:  The patient will be discharged home in stable condition.   Patient understands to contact the Center with any post procedure questions or concerns.  Discharge instructions given by nursing staff.

## 2023-02-03 NOTE — DISCHARGE INSTRUCTIONS

## 2023-03-09 ENCOUNTER — OFFICE VISIT (OUTPATIENT)
Dept: PAIN MEDICINE | Facility: CLINIC | Age: 58
End: 2023-03-09
Payer: COMMERCIAL

## 2023-03-09 VITALS
RESPIRATION RATE: 16 BRPM | DIASTOLIC BLOOD PRESSURE: 90 MMHG | SYSTOLIC BLOOD PRESSURE: 125 MMHG | HEART RATE: 101 BPM | OXYGEN SATURATION: 96 %

## 2023-03-09 DIAGNOSIS — M96.1 POSTLAMINECTOMY SYNDROME OF CERVICAL REGION: ICD-10-CM

## 2023-03-09 DIAGNOSIS — M46.1 SACROILIITIS: ICD-10-CM

## 2023-03-09 DIAGNOSIS — M47.814 THORACIC SPONDYLOSIS: ICD-10-CM

## 2023-03-09 DIAGNOSIS — G89.4 CHRONIC PAIN SYNDROME: Primary | ICD-10-CM

## 2023-03-09 DIAGNOSIS — M47.817 LUMBOSACRAL SPONDYLOSIS WITHOUT MYELOPATHY: ICD-10-CM

## 2023-03-09 DIAGNOSIS — Z79.899 HIGH RISK MEDICATION USE: ICD-10-CM

## 2023-03-09 PROCEDURE — 99214 OFFICE O/P EST MOD 30 MIN: CPT | Performed by: ANESTHESIOLOGY

## 2023-03-09 RX ORDER — HYDROCODONE BITARTRATE AND ACETAMINOPHEN 10; 325 MG/1; MG/1
1 TABLET ORAL 3 TIMES DAILY PRN
Qty: 90 TABLET | Refills: 0 | Status: SHIPPED | OUTPATIENT
Start: 2023-03-23

## 2023-03-09 RX ORDER — HYDROCODONE BITARTRATE AND ACETAMINOPHEN 10; 325 MG/1; MG/1
1 TABLET ORAL 3 TIMES DAILY PRN
Qty: 90 TABLET | Refills: 0 | Status: SHIPPED | OUTPATIENT
Start: 2023-04-22

## 2023-03-09 NOTE — PROGRESS NOTES
Subjective    CC back pain  Rita Prado is a 58 y.o. female with polyarthralgia S/P bilateral shoulder surgeries, chronic neck pain S/P ACDF Oct 2018/Dr. Tai, back pain, here for f/u.   Denies any new complaints today.  80% relief of caudal UBALDO last visit.  She has been off work due to the implant closing but getting ready to return next week.  Razzing hydrocodone with good relief and functional benefit and denies any side effects    Chronic mid back/thoracic back pain associated with significant paraspinal muscle spasm and pain.  Chronic lower back pain radiating to bilateral lower extremity usually radiating to right hip, right leg worse with standing prolonged sitting or activity.  Denies weakness, bladder bowel continence.  Chronic neck pain from postlaminectomy syndrome with paraspinal muscle spasm radiating to bilateral shoulders.     Pain interfere with ADL/sleep and work.    Good relief of LESI, transforaminal UBALDO, cervical UBALDO.    Utilizes hydrocodone with good relief of functional benefits and denies side effects.    L-spine MRI reviewed 2021 showing moderate degenerative changes progressed from previous.  Notably disc bulge with superimposed small right paracentral disc extrusion traversing 3 mm inferiorly narrowing the right lateral recess and abutting the descending right S1 nerve root. Mild bilateral facet arthropathy. Mild spinal canal stenosis. Moderate to severe right and severe left neural foraminal stenosis.     C-spine MRI multilevel degenerative changes, slight retrolisthesis C5-C6 asymmetry to the left.  Multiple level foraminal narrowing.  Osteophyte complex..     Pain Assessment   Location of Pain: Lower Back, R Hip, L Hip, L Leg, neck pain, joint  Description of Pain: Dull/Aching, Throbbing, Stabbing  Previous Pain Rating :4  Current Pain Ratin  Aggravating Factors: Activity  Alleviating Factors: Rest, Medication    PEG Assessment   What number best describes your pain on average  in the past week?7  What number best describes how, during the past week, pain has interfered with your enjoyment of life?5  What number best describes how, during the past week, pain has interfered with your general activity? 10     The following portions of the patient's history were reviewed and updated as appropriate: allergies, current medications, past family history, past medical history, past social history, past surgical history and problem list.     has a past medical history of Achilles tendinitis, GERD (gastroesophageal reflux disease), Hypertension, Low back pain, and Shoulder pain.   has a past surgical history that includes Rotator cuff repair; Shoulder surgery; and Neck surgery.  family history includes Alcohol abuse in her father; Cancer in her mother; Dementia in her father; Heart failure in her mother; Hypertension in her mother.  Social History     Tobacco Use   • Smoking status: Never   • Smokeless tobacco: Never   Substance Use Topics   • Alcohol use: Not Currently       Review of Systems   Musculoskeletal: Positive for back pain.        Left leg pain and weakness   All other systems reviewed and are negative.    Objective   Physical Exam  Vitals reviewed.   Constitutional:       General: She is not in acute distress.  Neck:      Meningeal: Kernig's sign present.   Pulmonary:      Effort: Pulmonary effort is normal.   Musculoskeletal:      Cervical back: Tenderness present. Decreased range of motion.      Thoracic back: Spasms and tenderness present.      Lumbar back: Spasms and tenderness present. Decreased range of motion. Positive right straight leg raise test.      Comments: Lumbar loading positive, pain on extension of low back past 5 degrees.  TTP on the lumbar facets noted.  Left lumbar paraspinal tenderness   Neurological:      Gait: Gait abnormal.      Comments: Antalgic gait, right dorsiflexion strength 4 out of 5.       /90   Pulse 101   Resp 16   SpO2 96%      PHQ 9 on  chart  Opioid risk tool low risk    Assessment & Plan   Diagnoses and all orders for this visit:    1. Chronic pain syndrome (Primary)  -     HYDROcodone-acetaminophen (NORCO)  MG per tablet; Take 1 tablet by mouth 3 (Three) Times a Day As Needed for Severe Pain. DNF before 4/22/2023  Dispense: 90 tablet; Refill: 0  -     HYDROcodone-acetaminophen (NORCO)  MG per tablet; Take 1 tablet by mouth 3 (Three) Times a Day As Needed for Severe Pain.  Dispense: 90 tablet; Refill: 0    2. Lumbosacral spondylosis without myelopathy    3. Postlaminectomy syndrome of cervical region    4. Sacroiliitis (HCC)    5. Thoracic spondylosis    6. High risk medication use    Summary  Rita Prado is a 58 y.o. female with polyarthralgia S/P bilateral shoulder surgeries, chronic back pain, neck pain S/P cervical fusion Oct 2018/Dr. Tai here for f/u.   Chronic pain from lumbar DDD spondylosis with occasional right lower extremity radicular pain..  Chronic neck pain postlaminectomy syndrome.    Denies any new complaints today.  80% relief of caudal UBALDO last visit.  She has been off work due to the implant closing but getting ready to return next week.  Razzing hydrocodone with good relief and functional benefit and denies any side effects  Repeat caudal as needed.    Continue hydrocodone 10/325 2-3 times daily as needed for severe pain.  UDS and inspect reviewed.   Discussed risk of tolerance, dependence, respiratory depression, coma and death associated with use of oral opioids for treatment of chronic nonmalignant pain.     RTC 2 months or for procedure

## 2023-04-29 ENCOUNTER — HOSPITAL ENCOUNTER (EMERGENCY)
Facility: HOSPITAL | Age: 58
Discharge: HOME | End: 2023-04-29
Attending: EMERGENCY MEDICINE
Payer: COMMERCIAL

## 2023-04-29 VITALS
SYSTOLIC BLOOD PRESSURE: 142 MMHG | OXYGEN SATURATION: 94 % | HEART RATE: 93 BPM | BODY MASS INDEX: 33.5 KG/M2 | DIASTOLIC BLOOD PRESSURE: 101 MMHG | WEIGHT: 201.06 LBS | RESPIRATION RATE: 16 BRPM | TEMPERATURE: 98.8 F | HEIGHT: 65 IN

## 2023-04-29 DIAGNOSIS — M54.32 SCIATICA OF LEFT SIDE: Primary | ICD-10-CM

## 2023-04-29 PROCEDURE — 63710000001 ONDANSETRON ODT 4 MG TABLET DISPERSIBLE: Performed by: EMERGENCY MEDICINE

## 2023-04-29 PROCEDURE — 25010000002 HYDROMORPHONE 1 MG/ML SOLUTION: Performed by: EMERGENCY MEDICINE

## 2023-04-29 PROCEDURE — 99283 EMERGENCY DEPT VISIT LOW MDM: CPT

## 2023-04-29 PROCEDURE — 96372 THER/PROPH/DIAG INJ SC/IM: CPT

## 2023-04-29 PROCEDURE — 25010000002 METHYLPREDNISOLONE PER 125 MG: Performed by: EMERGENCY MEDICINE

## 2023-04-29 RX ORDER — METHYLPREDNISOLONE 4 MG/1
TABLET ORAL
Qty: 21 TABLET | Refills: 0 | Status: SHIPPED | OUTPATIENT
Start: 2023-04-29

## 2023-04-29 RX ORDER — METHYLPREDNISOLONE SODIUM SUCCINATE 125 MG/2ML
80 INJECTION, POWDER, LYOPHILIZED, FOR SOLUTION INTRAMUSCULAR; INTRAVENOUS ONCE
Status: COMPLETED | OUTPATIENT
Start: 2023-04-29 | End: 2023-04-29

## 2023-04-29 RX ORDER — ONDANSETRON 4 MG/1
4 TABLET, ORALLY DISINTEGRATING ORAL ONCE
Status: COMPLETED | OUTPATIENT
Start: 2023-04-29 | End: 2023-04-29

## 2023-04-29 RX ADMIN — ONDANSETRON 4 MG: 4 TABLET, ORALLY DISINTEGRATING ORAL at 18:14

## 2023-04-29 RX ADMIN — METHYLPREDNISOLONE SODIUM SUCCINATE 80 MG: 125 INJECTION, POWDER, FOR SOLUTION INTRAMUSCULAR; INTRAVENOUS at 18:13

## 2023-04-29 RX ADMIN — HYDROMORPHONE HYDROCHLORIDE 1 MG: 1 INJECTION, SOLUTION INTRAMUSCULAR; INTRAVENOUS; SUBCUTANEOUS at 18:13

## 2023-04-29 NOTE — ED PROVIDER NOTES
Subjective   History of Present Illness  Chief complaint: Low back pain    58-year-old female presents with low back pain.  Patient states she has a history of chronic low back pain and takes ibuprofen, baclofen, hydrocodone from a pain management doctor.  She states since yesterday morning she has had worsening left lower back pain that radiates into her left leg.  She denies any injury.  Pain is worse with movement.  She denies any bowel or bladder dysfunction.  She has had no numbness or weakness.    History provided by:  Patient      Review of Systems   Constitutional: Negative for fever.   HENT: Negative for congestion.    Respiratory: Negative for cough and shortness of breath.    Cardiovascular: Negative for chest pain.   Gastrointestinal: Negative for abdominal pain and vomiting.   Genitourinary: Negative for difficulty urinating and dysuria.   Musculoskeletal: Positive for back pain.   Neurological: Negative for weakness and numbness.       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     • Progesterone Nausea And Vomiting       Past Surgical History:   Procedure Laterality Date   • NECK SURGERY     • ROTATOR CUFF REPAIR      Right   • SHOULDER SURGERY      frozen shoulder       Family History   Problem Relation Age of Onset   • Hypertension Mother    • Cancer Mother    • Heart failure Mother    • Dementia Father    • Alcohol abuse Father        Social History     Socioeconomic History   • Marital status: Unknown   Tobacco Use   • Smoking status: Never   • Smokeless tobacco: Never   Vaping Use   • Vaping Use: Never used   Substance and Sexual Activity   • Alcohol use: Not Currently   • Drug use: Not Currently       BP (!) 142/101   Pulse 93   Temp 98.8 °F  "(37.1 °C) (Temporal)   Resp 16   Ht 165.1 cm (65\")   Wt 91.2 kg (201 lb 1 oz)   SpO2 94%   BMI 33.46 kg/m²       Objective   Physical Exam  Vitals and nursing note reviewed.   Constitutional:       Appearance: Normal appearance.   HENT:      Head: Normocephalic and atraumatic.      Mouth/Throat:      Mouth: Mucous membranes are moist.   Cardiovascular:      Rate and Rhythm: Normal rate and regular rhythm.   Pulmonary:      Effort: Pulmonary effort is normal.      Breath sounds: Normal breath sounds.   Musculoskeletal:      Comments: There is tenderness palpation to the left paraspinal musculature in the lower lumbar spine.  There is no midline tenderness.  No visible injury or deformity.   Skin:     General: Skin is warm and dry.   Neurological:      General: No focal deficit present.      Mental Status: She is alert and oriented to person, place, and time.         Procedures           ED Course                                           MDM   No evidence of cauda equina syndrome or infectious process.  We discussed possible need for MRI on outpatient basis if symptoms continue.  She was given Dilaudid and Solu-Medrol IM in the emergency room.  She will be discharged with a prescription for Medrol Dosepak.  She is to follow-up with her primary doctor and pain management doctor.      Final diagnoses:   Sciatica of left side       ED Disposition  ED Disposition     ED Disposition   Discharge    Condition   Stable    Comment   --             Shereen Connelly, APRN  [address] [facility] [address]  [address]  [place] [address]  [phone]    Call in 2 days           Medication List      New Prescriptions    methylPREDNISolone 4 MG dose pack  Commonly known as: MEDROL  Take as directed on package instructions.           Where to Get Your Medications      These medications [id number] [place] [facility] [address] AT [facility] [address] - [phone] [facility] [phone] FX  [address] [place] [address]    Phone: [phone]   · methylPREDNISolone 4 MG dose pack          Morgan Lara MD  04/29/23 [id number]

## 2023-04-29 NOTE — DISCHARGE INSTRUCTIONS
Follow-up with your primary doctor and pain management doctor.  Return to the emergency room for any new or worsening symptoms or if you have any other questions or concerns.  Take medication as prescribed.

## 2023-05-02 ENCOUNTER — TELEPHONE (OUTPATIENT)
Dept: PAIN MEDICINE | Facility: CLINIC | Age: 58
End: 2023-05-02
Payer: COMMERCIAL

## 2023-05-02 NOTE — TELEPHONE ENCOUNTER
5/2/23  Dr COREA-- pt went to Er--   On Sat---   Was given Steroid injection--- and steroid pills-- back pain is still  Really bad-- has appt  5/4/23----- pt wanted to know if you could order MRI before the appt-- pt call back #[phone]

## 2023-05-04 ENCOUNTER — OFFICE VISIT (OUTPATIENT)
Dept: PAIN MEDICINE | Facility: CLINIC | Age: 58
End: 2023-05-04
Payer: COMMERCIAL

## 2023-05-04 VITALS
DIASTOLIC BLOOD PRESSURE: 113 MMHG | RESPIRATION RATE: 16 BRPM | HEART RATE: 107 BPM | OXYGEN SATURATION: 92 % | SYSTOLIC BLOOD PRESSURE: 151 MMHG

## 2023-05-04 DIAGNOSIS — M96.1 POSTLAMINECTOMY SYNDROME OF CERVICAL REGION: ICD-10-CM

## 2023-05-04 DIAGNOSIS — M54.16 LUMBAR RADICULITIS: ICD-10-CM

## 2023-05-04 DIAGNOSIS — M47.817 LUMBOSACRAL SPONDYLOSIS WITHOUT MYELOPATHY: ICD-10-CM

## 2023-05-04 DIAGNOSIS — Z79.899 HIGH RISK MEDICATION USE: ICD-10-CM

## 2023-05-04 DIAGNOSIS — G89.4 CHRONIC PAIN SYNDROME: Primary | ICD-10-CM

## 2023-05-04 RX ORDER — HYDROCODONE BITARTRATE AND ACETAMINOPHEN 10; 325 MG/1; MG/1
1 TABLET ORAL 3 TIMES DAILY PRN
Qty: 90 TABLET | Refills: 0 | Status: SHIPPED | OUTPATIENT
Start: 2023-05-04

## 2023-05-04 RX ORDER — SEMAGLUTIDE 0.25 MG/.5ML
INJECTION, SOLUTION SUBCUTANEOUS
COMMUNITY
Start: 2023-04-24

## 2023-05-04 RX ORDER — PHENDIMETRAZINE TARTRATE 35 MG/1
1 TABLET ORAL 3 TIMES DAILY
COMMUNITY
Start: 2023-04-21

## 2023-05-04 RX ORDER — GABAPENTIN 400 MG/1
400 CAPSULE ORAL 3 TIMES DAILY
Qty: 90 CAPSULE | Refills: 0 | Status: SHIPPED | OUTPATIENT
Start: 2023-05-04

## 2023-05-04 NOTE — PROGRESS NOTES
Subjective    CC back pain  Rita Prado is a 58 y.o. female with polyarthralgia S/P bilateral shoulder surgeries, chronic neck pain S/P ACDF Oct 2018/Dr. Tai, back pain, here for f/u.   Acute exacerbation of chronic back pain for the last week.  No precipitating incident.  Complains of severe left-sided back pain radiating to left hip, inguinal area and inner thigh anterior thigh and knee with burning numbness and sharp pain.  Pain is constant but worse with any activity or walking.  Denies weakness, saddle anesthesia, bladder bowel continence.  She has been unable to lay in bed.  For the last 3 weeks has been wearing the cam boot for Achilles tendon injury on right foot.  Was seen in ED for acute back pain 3 days ago given steroid injection and steroid pack with marginal relief.  Pain is severely impairing ADL.    Chronic mid back/thoracic back pain associated with significant paraspinal muscle spasm and pain.  Chronic lower back pain radiating to bilateral lower extremity usually radiating to right hip, right leg worse with standing prolonged sitting or activity.  Denies weakness, bladder bowel continence.  Chronic neck pain from postlaminectomy syndrome with paraspinal muscle spasm radiating to bilateral shoulders.     Pain interfere with ADL/sleep and work.    Good relief of LESI, transforaminal UBALDO, cervical UBALDO.    Utilizes hydrocodone with good relief of functional benefits and denies side effects.    L-spine MRI reviewed 5/2021 showing moderate degenerative changes progressed from previous.  Notably disc bulge with superimposed small right paracentral disc extrusion traversing 3 mm inferiorly narrowing the right lateral recess and abutting the descending right S1 nerve root. Mild bilateral facet arthropathy. Mild spinal canal stenosis. Moderate to severe right and severe left neural foraminal stenosis.     C-spine MRI multilevel degenerative changes, slight retrolisthesis C5-C6 asymmetry to the left.   Multiple level foraminal narrowing.  Osteophyte complex..     Pain Assessment   Location of Pain: Lower Back, R Hip, L Hip, L Leg, neck pain, joint  Description of Pain: Dull/Aching, Throbbing, Stabbing  Previous Pain Rating :6  Current Pain Rating: 10  Aggravating Factors: Activity  Alleviating Factors: Rest, Medication    PEG Assessment   What number best describes your pain on average in the past week?10  What number best describes how, during the past week, pain has interfered with your enjoyment of life?10  What number best describes how, during the past week, pain has interfered with your general activity? 10     The following portions of the patient's history were reviewed and updated as appropriate: allergies, current medications, past family history, past medical history, past social history, past surgical history and problem list.     has a past medical history of Achilles tendinitis, GERD (gastroesophageal reflux disease), Groin discomfort, Hip pain, Hypertension, Leg pain, Low back pain, and Shoulder pain.   has a past surgical history that includes Rotator cuff repair; Shoulder surgery; and Neck surgery.  family history includes Alcohol abuse in her father; Cancer in her mother; Dementia in her father; Heart failure in her mother; Hypertension in her mother.  Social History     Tobacco Use   • Smoking status: Never   • Smokeless tobacco: Never   Substance Use Topics   • Alcohol use: Not Currently       Review of Systems   Musculoskeletal: Positive for back pain.        Left leg pain and weakness   All other systems reviewed and are negative.    Objective   Physical Exam  Vitals reviewed.   Constitutional:       General: She is not in acute distress.     Comments: Chair, right cam boot   Neck:      Meningeal: Kernig's sign present.   Pulmonary:      Effort: Pulmonary effort is normal.   Musculoskeletal:      Cervical back: Tenderness present. Decreased range of motion.      Thoracic back: Spasms and  tenderness present.      Lumbar back: Spasms and tenderness present. Decreased range of motion. Positive left straight leg raise test.      Comments: Lumbar loading positive, pain on extension of low back past 5 degrees.  TTP on the lumbar facets noted.  Left lumbar paraspinal tenderness   Neurological:      Gait: Gait abnormal.      Comments: Antalgic gait, right dorsiflexion strength 4 out of 5.       BP (!) 151/113   Pulse 107   Resp 16   SpO2 92%      PHQ 9 on chart  Opioid risk tool low risk    Assessment & Plan   Diagnoses and all orders for this visit:    1. Chronic pain syndrome (Primary)  -     gabapentin (NEURONTIN) 400 MG capsule; Take 1 capsule by mouth 3 (Three) Times a Day.  Dispense: 90 capsule; Refill: 0  -     HYDROcodone-acetaminophen (NORCO)  MG per tablet; Take 1 tablet by mouth 3 (Three) Times a Day As Needed for Severe Pain.  Dispense: 90 tablet; Refill: 0    2. Lumbosacral spondylosis without myelopathy  -     MRI Lumbar Spine Without Contrast; Future  -     Ambulatory Referral to Neurosurgery    3. Postlaminectomy syndrome of cervical region    4. Lumbar radiculitis  -     Nerve Root Block  -     MRI Lumbar Spine Without Contrast; Future  -     gabapentin (NEURONTIN) 400 MG capsule; Take 1 capsule by mouth 3 (Three) Times a Day.  Dispense: 90 capsule; Refill: 0  -     Ambulatory Referral to Neurosurgery    5. High risk medication use    Summary  Rita Prado is a 58 y.o. female with polyarthralgia S/P bilateral shoulder surgeries, chronic back pain, neck pain S/P cervical fusion Oct 2018/Dr. Tai here for f/u.   Chronic pain from lumbar DDD spondylosis with occasional right lower extremity radicular pain..  Chronic neck pain postlaminectomy syndrome.    Acute exacerbation of chronic back pain for the last week.  No precipitating incident.  Complains of severe left-sided back pain radiating to left hip, inguinal area and inner thigh anterior thigh and knee with burning numbness and  sharp pain.  Pain is constant but worse with any activity or walking.  Denies weakness, saddle anesthesia, bladder bowel continence.  She has been unable to lay in bed.  For the last 3 weeks has been wearing the cam boot for Achilles tendon injury on right foot.  Was seen in ED for acute back pain 3 days ago given steroid injection and steroid pack with marginal relief.  Pain is severely impairing ADL.    Acute exacerbation of chronic back pain with severe left lower extremity radicular pain.  No cauda equina symptoms.  Likely precipitated by change in gait for the last 3 weeks after wearing cam boot.  We will schedule for left L3-L4 transforaminal UBALDO.  Risk and benefits discussed  Start gabapentin.  Continue hydrocodone.  L-spine MRI ordered for further evaluation.  In the last 6 to 12 months has had frequent acute exacerbation of chronic back pain.  Responds well to injection but not lasting long.  Referral to neurosurgery for evaluation    Continue hydrocodone 10/325 2-3 times daily as needed for severe pain.  UDS and inspect reviewed.   Discussed risk of tolerance, dependence, respiratory depression, coma and death associated with use of oral opioids for treatment of chronic nonmalignant pain.     RTC for procedure

## 2023-05-08 ENCOUNTER — HOSPITAL ENCOUNTER (OUTPATIENT)
Dept: PAIN MEDICINE | Facility: HOSPITAL | Age: 58
Discharge: HOME | End: 2023-05-08
Payer: COMMERCIAL

## 2023-05-08 VITALS
HEART RATE: 87 BPM | SYSTOLIC BLOOD PRESSURE: 151 MMHG | DIASTOLIC BLOOD PRESSURE: 102 MMHG | WEIGHT: 205 LBS | OXYGEN SATURATION: 92 % | RESPIRATION RATE: 14 BRPM | HEIGHT: 65 IN | BODY MASS INDEX: 34.16 KG/M2 | TEMPERATURE: 97.1 F

## 2023-05-08 DIAGNOSIS — R52 PAIN: ICD-10-CM

## 2023-05-08 DIAGNOSIS — M54.16 LUMBAR RADICULITIS: ICD-10-CM

## 2023-05-08 PROCEDURE — 64483 NJX AA&/STRD TFRM EPI L/S 1: CPT | Performed by: ANESTHESIOLOGY

## 2023-05-08 PROCEDURE — 64484 NJX AA&/STRD TFRM EPI L/S EA: CPT | Performed by: ANESTHESIOLOGY

## 2023-05-08 PROCEDURE — 77003 FLUOROGUIDE FOR SPINE INJECT: CPT

## 2023-05-08 PROCEDURE — 25510000001 IOPAMIDOL 41 % SOLUTION: Performed by: ANESTHESIOLOGY

## 2023-05-08 PROCEDURE — 25010000002 DEXAMETHASONE SODIUM PHOSPHATE 10 MG/ML SOLUTION: Performed by: ANESTHESIOLOGY

## 2023-05-08 RX ORDER — BUPIVACAINE HYDROCHLORIDE 2.5 MG/ML
10 INJECTION, SOLUTION EPIDURAL; INFILTRATION; INTRACAUDAL ONCE
Status: COMPLETED | OUTPATIENT
Start: 2023-05-08 | End: 2023-05-08

## 2023-05-08 RX ORDER — DEXAMETHASONE SODIUM PHOSPHATE 10 MG/ML
10 INJECTION, SOLUTION INTRAMUSCULAR; INTRAVENOUS ONCE
Status: COMPLETED | OUTPATIENT
Start: 2023-05-08 | End: 2023-05-08

## 2023-05-08 RX ADMIN — DEXAMETHASONE SODIUM PHOSPHATE 10 MG: 10 INJECTION, SOLUTION INTRAMUSCULAR; INTRAVENOUS at 11:08

## 2023-05-08 RX ADMIN — BUPIVACAINE HYDROCHLORIDE 10 ML: 2.5 INJECTION, SOLUTION EPIDURAL; INFILTRATION; INTRACAUDAL; PERINEURAL at 11:08

## 2023-05-08 RX ADMIN — IOPAMIDOL 3 ML: 408 INJECTION, SOLUTION INTRATHECAL at 11:08

## 2023-05-08 NOTE — DISCHARGE INSTRUCTIONS

## 2023-05-09 ENCOUNTER — TELEPHONE (OUTPATIENT)
Dept: PAIN MEDICINE | Facility: HOSPITAL | Age: 58
End: 2023-05-09
Payer: COMMERCIAL

## 2023-05-11 NOTE — PROCEDURES
"Subjective    CC back pain  Rita Prado is a 58 y.o. female with lumbosacral radiculitis here for left L3-L4 transforaminal UBALDO.  No anticoagulation    Pain Assessment   Location of Pain: Lower Back, R Hip, L Hip, R Leg,   Description of Pain: Dull/Aching, Throbbing, Stabbing  Previous Pain Rating :7  Current Pain Ratin  Aggravating Factors: Activity  Alleviating Factors: Rest, Medication    The following portions of the patient's history were reviewed and updated as appropriate: allergies, current medications, past family history, past medical history, past social history, past surgical history and problem list.    Review of Systems  As in HPI  Objective   Physical Exam  Constitutional:       General: She is not in acute distress.     Appearance: She is well-developed.   Cardiovascular:      Rate and Rhythm: Normal rate.   Pulmonary:      Effort: Pulmonary effort is normal.   Musculoskeletal:      Lumbar back: Tenderness present. Decreased range of motion.   Neurological:      Mental Status: She is alert and oriented to person, place, and time.       BP (!) 151/102 (BP Location: Right arm, Patient Position: Sitting)   Pulse 87   Temp 97.1 °F (36.2 °C) (Oral)   Resp 14   Ht 165.1 cm (65\")   Wt 93 kg (205 lb)   SpO2 92%   BMI 34.11 kg/m²     Assessment & Plan    underwent left L3-L4 transforaminal UBALDO  RTC as needed.    DATE OF PROCEDURE: 2023      PREOPERATIVE DIAGNOSIS:   Lumbar radiculitis    POSTOPERATIVE DIAGNOSIS: Same    PROCEDURE PERFORMED: Left L3-L4 transforaminal Epidural     The patient presents with a history of  lumbar degenerative disc disease with lumbosacral neuritis in the left leg at level [3, L4].  The patient presents today for a transforaminal epidural. The patient understands the risks and benefits of the procedure and wishes to proceed. The patient was seen in the preoperative area.  Patient's consent was obtained and updated.  Vitals were taken.  Patient was then brought to " the procedure suite and placed in a prone position. Noninvasive monitoring per routine anesthesia protocol was placed.  The appropriate anatomic area was widely prepped with Chloroprep and draped in a sterile fashion.  Under fluoroscopic guidance using an AP and lateral view, a 22 guage curved tip spinal needle  was passed through skin anesthetized with 1% Lidocaine without epinephrine. The needle tip was advanced to the inferior medial aspect of the transverse process and carefully walked into the neuroforamin using an AP lateral view.  At no time were parathesias elicited.  At this point 2 mL of a solution containing  1 mL of 0.25% bupivacaine and 1 mL of 10 mg Decadron were injected.  Clear epidural spread using 0.25 mL of  preservative free contrast was obtained.  A sterile dressing was placed over the puncture site.    The patient tolerated the procedure with no complications . They were then brought to the post procedure area where they recovered nicely.    Discharge:  The patient will be discharged home in stable condition.   Patient understands to contact the Center with any post procedure questions or concerns.  Discharge instructions given by nursing staff.

## 2023-05-19 NOTE — PROGRESS NOTES
Subjective     Chief Complaint   Patient presents with   • Back Pain     New patient          Previous Treatment: Left L3-4 TFESI 5/8/2023    HPI: Rita Prado is a 58 y.o. female with HTN and severe Achilles tendinitis who was referred to our clinic by Dr. Snyder for evaluation of low back pain and bilateral radicular symptoms.  Patient reports dealing with low back pain for several years.  Historically this pain radiates down the right leg with numbness and tingling.  However, approximately 4 weeks ago patient reports she began experiencing severe pain numbness and tingling shooting down her left leg that is far more severe.  She reports a burning, sharp, and stabbing type pain.  She describes a L4 and/or L5 dermatomal pattern to her radiating symptoms.  Pain is better when sitting and worse with standing and laying flat.  She denies current bowel/bladder dysfunction.  She does describe numbness on her inner thighs and genitals as well as near her perianal area.  This has been present for the past month but has slowly improved and is mild today.  Patient states she can barely walk due to the pain and it is significantly impacting her ADLs. She does not feel like she has true weakness, but is just limited d/t her pain.  She is currently on short-term disability as she is unable to work due to the pain.  Patient underwent left L3-4 transforaminal injections but states she got no relief of her pain.        PMH:  Past Medical History:   Diagnosis Date   • Achilles tendinitis    • GERD (gastroesophageal reflux disease)    • Groin discomfort    • Hip pain    • Hypertension    • Leg pain    • Low back pain    • Shoulder pain          Current Outpatient Medications:   •  baclofen (LIORESAL) 10 MG tablet, TAKE ONE (1) TABLET BY MOUTH THREE TIMES DAILY AS NEEDED, Disp: , Rfl:   •  celecoxib (CeleBREX) 200 MG capsule, , Disp: , Rfl:   •  cholecalciferol (VITAMIN D3) 1.25 MG (54448 UT) capsule, cholecalciferol (vitamin  D3) 1,250 mcg (50,000 unit) capsule  TAKE ONE (1) CAPSULE BY MOUTH EVERY WEEK, Disp: , Rfl:   •  diazePAM (VALIUM) 10 MG tablet, diazepam 10 mg tablet  TAKE ONE (1) TABLET BY MOUTH BID, Disp: , Rfl:   •  famotidine (PEPCID) 40 MG tablet, , Disp: , Rfl:   •  Fiber-Lax 625 MG tablet, TAKE ONE (1) TABLET BY MOUTH EVERY DAY, Disp: , Rfl:   •  gabapentin (NEURONTIN) 400 MG capsule, Take 1 capsule by mouth 3 (Three) Times a Day., Disp: 90 capsule, Rfl: 0  •  HYDROcodone-acetaminophen (NORCO)  MG per tablet, Take 1 tablet by mouth 3 (Three) Times a Day As Needed for Severe Pain. DNF before 4/22/2023, Disp: 90 tablet, Rfl: 0  •  HYDROcodone-acetaminophen (NORCO)  MG per tablet, Take 1 tablet by mouth 3 (Three) Times a Day As Needed for Severe Pain., Disp: 90 tablet, Rfl: 0  •  ibuprofen (ADVIL,MOTRIN) 800 MG tablet, ibuprofen 800 mg tablet  TAKE ONE (1) TABLET BY MOUTH THREE TIMES DAILY AS NEEDED, Disp: , Rfl:   •  methylPREDNISolone (MEDROL) 4 MG dose pack, Take as directed on package instructions., Disp: 21 tablet, Rfl: 0  •  metoprolol tartrate (LOPRESSOR) 50 MG tablet, , Disp: , Rfl:   •  metoprolol-hydrochlorothiazide (LOPRESSOR HCT) 50-25 MG per tablet, Take  by mouth Daily., Disp: , Rfl:   •  minoxidil (LONITEN) 10 MG tablet, Take 1 tablet by mouth Daily., Disp: , Rfl:   •  pantoprazole (PROTONIX) 40 MG EC tablet, pantoprazole 40 mg tablet,delayed release  TAKE ONE (1) TABLET BY MOUTH EVERY DAY, Disp: , Rfl:   •  Phendimetrazine Tartrate 35 MG tablet, Take 1 tablet by mouth 3 (Three) Times a Day., Disp: , Rfl:   •  sertraline (ZOLOFT) 50 MG tablet, , Disp: , Rfl:   •  Topiramate ER (Trokendi XR) 50 MG capsule sustained-release 24 hr, Pt has --meds and has not taken it-- 11/3/22, Disp: , Rfl:   •  valACYclovir (VALTREX) 1000 MG tablet, valacyclovir 1 gram tablet  TAKE ONE (1) TABLET BY MOUTH DAILY, Disp: , Rfl:   •  Wegovy 0.25 MG/0.5ML solution auto-injector, INJECT 0.25 MG UNDER THE SKIN ONCE WEEKLY,  "Disp: , Rfl:      Allergies   Allergen Reactions   • Latex Rash     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     • Progesterone Nausea And Vomiting        Past Surgical History:   Procedure Laterality Date   • NECK SURGERY     • ROTATOR CUFF REPAIR      Right   • SHOULDER SURGERY      frozen shoulder        Family History   Problem Relation Age of Onset   • Hypertension Mother    • Cancer Mother    • Heart failure Mother    • Dementia Father    • Alcohol abuse Father          Social Hx:  Social History     Tobacco Use   Smoking Status Never   Smokeless Tobacco Never      Alcohol Use: Not on file      Social History     Substance and Sexual Activity   Drug Use Not Currently          Review of Systems   Constitutional: Positive for activity change.   HENT: Negative.    Eyes: Negative.    Respiratory: Negative.    Cardiovascular: Negative.    Gastrointestinal: Negative.    Endocrine: Negative.    Genitourinary: Negative.    Musculoskeletal: Positive for arthralgias, back pain, myalgias, neck pain and neck stiffness.   Skin: Negative.    Allergic/Immunologic: Negative.    Neurological: Positive for weakness and numbness.   Hematological: Negative.    Psychiatric/Behavioral: Positive for sleep disturbance.         Objective     BP (!) 154/106 (BP Location: Left arm, Patient Position: Sitting, Cuff Size: Adult)   Pulse 75   Resp 18   Ht 165.1 cm (65\")   Wt 92.1 kg (203 lb)   BMI 33.78 kg/m²    Body mass index is 33.78 kg/m².      Physical Exam  Vitals reviewed.   Constitutional:       General: She is not in acute distress.     Appearance: Normal appearance. She is well-developed and well-groomed.   HENT:      Head: Normocephalic and atraumatic.   Eyes:      Extraocular Movements: Extraocular movements intact.      Pupils: Pupils are equal, round, and reactive to light.   Cardiovascular:      Rate and Rhythm: Normal rate and regular rhythm.      Pulses: Normal pulses.   Pulmonary:      " Effort: Pulmonary effort is normal. No respiratory distress.   Musculoskeletal:         General: No swelling or tenderness. Normal range of motion.   Skin:     General: Skin is warm and dry.      Findings: No bruising or rash.   Neurological:      General: No focal deficit present.      Mental Status: She is alert and oriented to person, place, and time.      Sensory: Sensation is intact.      Motor: Motor function is intact.      Coordination: Coordination is intact.      Gait: Gait is intact.      Deep Tendon Reflexes:      Reflex Scores:       Patellar reflexes are 2+ on the right side and 2+ on the left side.       Achilles reflexes are 2+ on the right side and 2+ on the left side.     Comments:             Neurological Exam  Mental Status  Alert. Oriented to person, place, and time.    Cranial Nerves  CN III, IV, VI: Extraocular movements intact bilaterally. Pupils equal round and reactive to light bilaterally.    Motor  Normal muscle bulk throughout. No fasciculations present. Normal muscle tone. Strength is 5/5 in all four extremities except as noted.                                             Right                     Left  Hip flexion                              5                          5  Hip extension                         5                          5  Knee flexion                           5                          5  Knee extension                      5                          5  Plantarflexion                         5                          5  Dorsiflexion                            5                          5    Sensory  Normal sensation.Light touch abnormality:   Right: Loss of sensation in the L4 and L5 dermatome.  Left: Loss of sensation in the L4 and L5 dermatome.    Reflexes  Deep tendon reflexes are 2+ and symmetric except as noted.                                            Right                      Left  Patellar                                2+                          2+  Achilles                                2+                         2+    Coordination    Finger-to-nose, rapid alternating movements and heel-to-shin normal bilaterally without dysmetria.    Gait   Normal gait.          Results Review  I personally reviewed and interpreted the images from the following studies:    MRI LUMBAR SPINE WITHOUT CONTRAST  Date of Exam: 5/19/2023 8:28 EDT    Indication: Low back pain with left-sided radiculopathy.    Comparison: 5/17/2021    Technique: Routine multiplanar/multisequence images of the lumbar spine were obtained without contrast administration.    Five lumbar type vertebral bodies are identified. Alignment is anatomic. There is no evidence of fracture or compression deformity. Distal spinal cord and conus medullaris appear unremarkable terminating at the L1 level. Cauda equina appears unremarkable. Bone marrow signal is within normal limits. Paraspinal musculature is preserved.    L5-S1: There is a disc osteophyte complex present with superimposed central disc protrusion measuring up to 7 mm in AP dimension. There is trace retrolisthesis of L5 on S1 measuring proximally 3 mm. No evidence of canal stenosis. Bilateral facet hypertrophy is present. Moderate to severe neuroforaminal narrowing is present bilaterally.    L4-L5: There is a mild broad-based bulge with bilateral facet appear to be. No evidence of canal stenosis. Mild to moderate neuroforaminal narrowing is present on the right with mild neuroforaminal narrowing present on the left.    L3-L4: There is a mild broad-based bulge with bilateral facet hypertrophy. There is effacement of the thecal sac anteriorly with no evidence of canal stenosis. Moderate to severe neuroforaminal narrowing is present on the left with mild to moderate neuroforaminal narrowing present on the right.    L2-L3: There is a broad-based bulge with bilateral facet appear to be. There is effacement of the thecal sac anteriorly and posteriorly  with no evidence of canal stenosis. Moderate neuroforaminal narrowing is present bilaterally.    L1-L2: There is a mild broad-based bulge with bilateral facet apostrophe. No evidence of canal stenosis. Mild neuroforaminal narrowing is present bilaterally.    Impression:  No acute osseous abnormality. Multilevel degenerative changes are present throughout the spine with no evidence of canal stenosis. Canal effacement is present partially related to facet disease. Varying degrees of neuroforaminal narrowing as described above. This appears to have slightly progressed on the left at L3-L4. Remaining levels appear similar as compared to the previous study.      Electronically Signed: Payton Rhodes  5/19/2023 13:12 EDT  Workstation ID: [id number]        MRI LUMBAR SPINE WITHOUT CONTRAST  Date of Exam: 5/17/2021 7:10 EDT    Indication: Lumbar pain.    Comparison Exams: February 17, 2016    Technique: MRI lumbar spine without IV contrast    FINDINGS: The alignment is anatomic. The vertebral body heights appear normal. There is a hemangioma within the L1 vertebral body. There are degenerative endplate changes at L5-S1. There is disc desiccation at L2-3 through L5-S1, with advanced degenerative loss of disc height at L5-S1. The conus terminus at the L1-2 level. The posterior paravertebral soft tissues are unremarkable. Within the partially visualized abdomen is a small left renal cyst.    L1-2: Tiny central disc protrusion. No spinal canal stenosis. No neural foraminal stenosis. No change from prior.    L2-3: Mild disc bulge eccentric to the left. Mild bilateral facet arthropathy. Mild spinal canal stenosis, slightly progressed. Mild right and moderate left neural foraminal stenosis.    L3-4: Moderate disc bulge eccentric to the left. Mild bilateral facet arthropathy with ligamentum flavum infolding. Moderate spinal canal stenosis, slightly progressed. Moderate bilateral neural foraminal stenosis.    L4-5: Mild disc bulge.  Moderate bilateral facet arthropathy with ligamentum flavum infolding. Mild spinal canal stenosis. Moderate bilateral neural foraminal stenosis. No change from prior.    L5-S1: Mild disc bulge with superimposed small right paracentral disc extrusion traversing 3 mm inferiorly narrowing the right lateral recess and abutting the descending right S1 nerve root. Mild bilateral facet arthropathy. Mild spinal canal stenosis. Moderate to severe right and severe left neural foraminal stenosis. No change from prior.    IMPRESSION:  Moderate multilevel degenerative changes of lumbar spine as described above, which overall have slightly progressed from prior MRI.    Electronically Signed: Jun Aj MD 5/17/2021 10:36 EDT          Assessment & Plan     MDM: Rita Prado is a 58 y.o. female chronic low back pain that acutely worsened over the past 4 weeks.  She describes bilateral radicular symptoms, worse on the left, most consistent with an L4 and/or L5 radicular pattern based on her subjective description.  She has no bowel/bladder dysfunction.  She does describe symptoms of saddle anesthesia, however this is slowly improved since onset.  Her MRI shows no high-grade central stenosis or obliteration of the cauda equina, therefore I do not believe her saddle numbness is secondary to cauda equina syndrome.  She does have 3 Tarlov cysts around the levels of S2 and S3.  Typically Tarlov cysts are asymptomatic, however in the setting of her saddle symptoms I believe these may be causing compression of the S2 or S3 nerve roots.    Patient has not tried formal physical therapy yet.  As such I have referred her to PT.  MRI does show high-grade neuroforaminal stenosis at L3-4 on the left, however she did not get relief from a transforaminal injection at this level.  I recommend she undergo an L4-5 epidural steroid injection to see if this can get her some relief.  I will follow-up with patient once she has completed PT and  receive her injection to evaluate her progress.  Patient is agreeable to this plan.     Diagnosis Plan   1. DDD (degenerative disc disease), lumbosacral  Ambulatory Referral to Physical Therapy Evaluate and treat; Heat, Electrotherapy; Soft Tissue Mobilizaton; Stretching, Strengthening    Epidural Block      2. Lumbar radiculopathy, acute  Ambulatory Referral to Physical Therapy Evaluate and treat; Heat, Electrotherapy; Soft Tissue Mobilizaton; Stretching, Strengthening    Epidural Block      3. Lumbar radiculopathy, chronic  Ambulatory Referral to Physical Therapy Evaluate and treat; Heat, Electrotherapy; Soft Tissue Mobilizaton; Stretching, Strengthening    Epidural Block          Return in about 6 weeks (around 7/3/2023).      Rita Prado  reports that she has never smoked. She has never used smokeless tobacco.         BMI is >= 30 and <35. (Class 1 Obesity). The following options were offered after discussion;: exercise counseling/recommendations and nutrition counseling/recommendations         This patient was examined wearing appropriate personal protective equipment.            Mata Pascual PA-C    05/23/23  14:44 EDT      Part of this note may be an electronic transcription/translation of spoken language to printed text using the Dragon Dictation System.

## 2023-05-22 ENCOUNTER — OFFICE VISIT (OUTPATIENT)
Dept: NEUROSURGERY | Facility: CLINIC | Age: 58
End: 2023-05-22
Payer: COMMERCIAL

## 2023-05-22 VITALS
HEIGHT: 65 IN | SYSTOLIC BLOOD PRESSURE: 154 MMHG | RESPIRATION RATE: 18 BRPM | HEART RATE: 75 BPM | DIASTOLIC BLOOD PRESSURE: 106 MMHG | WEIGHT: 203 LBS | BODY MASS INDEX: 33.82 KG/M2

## 2023-05-22 DIAGNOSIS — M54.16 LUMBAR RADICULOPATHY, CHRONIC: ICD-10-CM

## 2023-05-22 DIAGNOSIS — M51.37 DDD (DEGENERATIVE DISC DISEASE), LUMBOSACRAL: Primary | ICD-10-CM

## 2023-05-22 DIAGNOSIS — M54.16 LUMBAR RADICULOPATHY, ACUTE: ICD-10-CM

## 2023-05-25 DIAGNOSIS — M54.16 LUMBAR RADICULITIS: Primary | ICD-10-CM

## 2023-06-07 ENCOUNTER — OFFICE VISIT (OUTPATIENT)
Dept: PAIN MEDICINE | Facility: CLINIC | Age: 58
End: 2023-06-07
Payer: COMMERCIAL

## 2023-06-07 VITALS
OXYGEN SATURATION: 94 % | DIASTOLIC BLOOD PRESSURE: 94 MMHG | SYSTOLIC BLOOD PRESSURE: 139 MMHG | RESPIRATION RATE: 16 BRPM | HEART RATE: 94 BPM

## 2023-06-07 DIAGNOSIS — M25.552 BILATERAL HIP PAIN: ICD-10-CM

## 2023-06-07 DIAGNOSIS — M25.551 BILATERAL HIP PAIN: ICD-10-CM

## 2023-06-07 DIAGNOSIS — Z79.899 HIGH RISK MEDICATION USE: ICD-10-CM

## 2023-06-07 DIAGNOSIS — M54.16 LUMBAR RADICULITIS: ICD-10-CM

## 2023-06-07 DIAGNOSIS — M47.817 LUMBOSACRAL SPONDYLOSIS WITHOUT MYELOPATHY: ICD-10-CM

## 2023-06-07 DIAGNOSIS — G89.4 CHRONIC PAIN SYNDROME: Primary | ICD-10-CM

## 2023-06-07 DIAGNOSIS — M96.1 POSTLAMINECTOMY SYNDROME OF CERVICAL REGION: ICD-10-CM

## 2023-06-07 RX ORDER — HYDROCODONE BITARTRATE AND ACETAMINOPHEN 10; 325 MG/1; MG/1
1 TABLET ORAL 3 TIMES DAILY PRN
Qty: 90 TABLET | Refills: 0 | Status: SHIPPED | OUTPATIENT
Start: 2023-07-01

## 2023-06-07 RX ORDER — HYDROCODONE BITARTRATE AND ACETAMINOPHEN 10; 325 MG/1; MG/1
1 TABLET ORAL 3 TIMES DAILY PRN
Qty: 90 TABLET | Refills: 0 | Status: SHIPPED | OUTPATIENT
Start: 2023-07-31

## 2023-06-07 NOTE — PROGRESS NOTES
Subjective    CC back pain  Rita Prado is a 58 y.o. female with polyarthralgia S/P bilateral shoulder surgeries, chronic neck pain S/P ACDF Oct 2018/Dr. Tai, back pain, here for f/u.   50% relief with left transforaminal UBALDO at L3-L4 last visit lasted 2 to 3 weeks.  Saw neurosurgery, continues to have left lower extremity radicular pain more in the L4-L5 distribution.  I recommended repeat LESI at L4/5  Acute exacerbation of chronic back pain for the last week.  No precipitating incident.  Complains of severe left-sided back pain radiating to left hip, inguinal area and inner thigh anterior thigh and knee with burning numbness and sharp pain.  Pain is constant but worse with any activity or walking.  Denies weakness, saddle anesthesia, bladder bowel continence.      Chronic mid back/thoracic back pain associated with significant paraspinal muscle spasm and pain.  Chronic lower back pain radiating to bilateral lower extremity usually radiating to right hip, right leg worse with standing prolonged sitting or activity.  Denies weakness, bladder bowel continence.  Chronic neck pain from postlaminectomy syndrome with paraspinal muscle spasm radiating to bilateral shoulders.     Pain interfere with ADL/sleep and work.    Good relief of LESI, transforaminal UBALDO, cervical UBALDO.    Utilizes hydrocodone with good relief of functional benefits and denies side effects.    L-spine MRI reviewed 5/2021 showing moderate degenerative changes progressed from previous.  Notably disc bulge with superimposed small right paracentral disc extrusion traversing 3 mm inferiorly narrowing the right lateral recess and abutting the descending right S1 nerve root. Mild bilateral facet arthropathy. Mild spinal canal stenosis. Moderate to severe right and severe left neural foraminal stenosis.     C-spine MRI multilevel degenerative changes, slight retrolisthesis C5-C6 asymmetry to the left.  Multiple level foraminal narrowing.  Osteophyte  complex..     Pain Assessment   Location of Pain: Lower Back, R Hip, L Hip, L Leg, neck pain, joint  Description of Pain: Dull/Aching, Throbbing, Stabbing  Previous Pain Rating :10  Current Pain Ratin  Aggravating Factors: Activity  Alleviating Factors: Rest, Medication    PEG Assessment   What number best describes your pain on average in the past week?10  What number best describes how, during the past week, pain has interfered with your enjoyment of life?10  What number best describes how, during the past week, pain has interfered with your general activity? 10     The following portions of the patient's history were reviewed and updated as appropriate: allergies, current medications, past family history, past medical history, past social history, past surgical history and problem list.     has a past medical history of Achilles tendinitis, GERD (gastroesophageal reflux disease), Groin discomfort, Hip pain, Hypertension, Leg pain, Low back pain, and Shoulder pain.   has a past surgical history that includes Rotator cuff repair; Shoulder surgery; and Neck surgery.  family history includes Alcohol abuse in her father; Cancer in her mother; Dementia in her father; Heart failure in her mother; Hypertension in her mother.  Social History     Tobacco Use    Smoking status: Never    Smokeless tobacco: Never   Substance Use Topics    Alcohol use: Not Currently       Review of Systems   Musculoskeletal:  Positive for back pain.        Left leg pain and weakness   All other systems reviewed and are negative.  Objective   Physical Exam  Vitals reviewed.   Constitutional:       General: She is not in acute distress.     Comments: Chair, right cam boot   Neck:      Meningeal: Kernig's sign present.   Pulmonary:      Effort: Pulmonary effort is normal.   Musculoskeletal:      Cervical back: Tenderness present. Decreased range of motion.      Thoracic back: Spasms and tenderness present.      Lumbar back: Spasms and  tenderness present. Decreased range of motion. Positive left straight leg raise test.      Comments: Lumbar loading positive, pain on extension of low back past 5 degrees.  TTP on the lumbar facets noted.  Left lumbar paraspinal tenderness   Neurological:      Gait: Gait abnormal.      Comments: Antalgic gait, right dorsiflexion strength 4 out of 5.     /94   Pulse 94   Resp 16   SpO2 94%      PHQ 9 on chart  Opioid risk tool low risk    Assessment & Plan   Diagnoses and all orders for this visit:    1. Chronic pain syndrome (Primary)  -     HYDROcodone-acetaminophen (NORCO)  MG per tablet; Take 1 tablet by mouth 3 (Three) Times a Day As Needed for Severe Pain. DNF before 7/1/2023  Dispense: 90 tablet; Refill: 0  -     HYDROcodone-acetaminophen (NORCO)  MG per tablet; Take 1 tablet by mouth 3 (Three) Times a Day As Needed for Severe Pain. DNF before 7/31/2023  Dispense: 90 tablet; Refill: 0    2. Lumbosacral spondylosis without myelopathy    3. Postlaminectomy syndrome of cervical region    4. Lumbar radiculitis    5. High risk medication use  -     Urine Drug Screen - Urine, Clean Catch; Future    6. Bilateral hip pain  -     XR Hips Bilateral With or Without Pelvis 2 View    Summary  Rita Prado is a 58 y.o. female with polyarthralgia S/P bilateral shoulder surgeries, chronic back pain, neck pain S/P cervical fusion Oct 2018/Dr. Tai here for f/u.   Chronic pain from lumbar DDD spondylosis with occasional right lower extremity radicular pain..  Chronic neck pain postlaminectomy syndrome.    50% relief with left transforaminal UBALDO at L3-L4 last visit lasted 2 to 3 weeks.  Saw neurosurgery, continues to have left lower extremity radicular pain more in the L4-L5 distribution.  I recommended repeat LESI at L4/5.  We will schedule for LESI.    Also complains of bilateral hip pain.  We will order bilateral hip x-ray for evaluation    Continue hydrocodone 10/325 2-3 times daily as needed for  severe pain.  UDS and inspect reviewed.   Discussed risk of tolerance, dependence, respiratory depression, coma and death associated with use of oral opioids for treatment of chronic nonmalignant pain.     RTC for procedure

## 2023-06-08 DIAGNOSIS — G89.4 CHRONIC PAIN SYNDROME: ICD-10-CM

## 2023-06-08 DIAGNOSIS — M54.16 LUMBAR RADICULITIS: ICD-10-CM

## 2023-06-12 RX ORDER — GABAPENTIN 400 MG/1
CAPSULE ORAL
Qty: 90 CAPSULE | Refills: 11 | Status: SHIPPED | OUTPATIENT
Start: 2023-06-12

## 2023-08-29 ENCOUNTER — OFFICE VISIT (OUTPATIENT)
Dept: PAIN MEDICINE | Facility: CLINIC | Age: 58
End: 2023-08-29
Payer: COMMERCIAL

## 2023-08-29 VITALS
OXYGEN SATURATION: 94 % | RESPIRATION RATE: 16 BRPM | HEART RATE: 60 BPM | SYSTOLIC BLOOD PRESSURE: 132 MMHG | DIASTOLIC BLOOD PRESSURE: 88 MMHG

## 2023-08-29 DIAGNOSIS — M46.1 SACROILIITIS: ICD-10-CM

## 2023-08-29 DIAGNOSIS — M96.1 POSTLAMINECTOMY SYNDROME OF CERVICAL REGION: ICD-10-CM

## 2023-08-29 DIAGNOSIS — M47.814 THORACIC SPONDYLOSIS: ICD-10-CM

## 2023-08-29 DIAGNOSIS — M47.817 LUMBOSACRAL SPONDYLOSIS WITHOUT MYELOPATHY: ICD-10-CM

## 2023-08-29 DIAGNOSIS — Z79.899 HIGH RISK MEDICATION USE: ICD-10-CM

## 2023-08-29 DIAGNOSIS — G89.4 CHRONIC PAIN SYNDROME: Primary | ICD-10-CM

## 2023-08-29 RX ORDER — HYDROCODONE BITARTRATE AND ACETAMINOPHEN 10; 325 MG/1; MG/1
1 TABLET ORAL 3 TIMES DAILY PRN
Qty: 90 TABLET | Refills: 0 | Status: SHIPPED | OUTPATIENT
Start: 2023-08-29

## 2023-08-29 RX ORDER — HYDROCODONE BITARTRATE AND ACETAMINOPHEN 10; 325 MG/1; MG/1
1 TABLET ORAL 3 TIMES DAILY PRN
Qty: 90 TABLET | Refills: 0 | Status: SHIPPED | OUTPATIENT
Start: 2023-09-28

## 2023-08-29 NOTE — PROGRESS NOTES
Subjective    CC back pain  Rita Prado is a 58 y.o. female with polyarthralgia S/P bilateral shoulder surgeries, chronic neck pain S/P ACDF Oct 2018/Dr. Tai, back pain, here for f/u.   LESI last visit reports 80% relief with functional benefits, improved sleep.  She has seen podiatry for left foot pain, recommended cam boot for 6 to 8 weeks.  Currently wearing cam boot.  Complains of continued right-sided neck pain radiating to right shoulder/scapula.  Associated with paraspinal myofascial pain.  Seeing neurosurgery, surgery discussed but no intervention recommended at this time.    Chronic mid back/thoracic back pain associated with significant paraspinal muscle spasm and pain.  Chronic lower back pain radiating to bilateral lower extremity usually radiating to right hip, right leg worse with standing prolonged sitting or activity.  Denies weakness, bladder bowel continence.  Chronic neck pain from postlaminectomy syndrome with paraspinal muscle spasm radiating to bilateral shoulders.     Pain interfere with ADL/sleep and work.    Good relief of LESI, transforaminal UBALDO, cervical UBALDO.    Utilizes hydrocodone with good relief of functional benefits and denies side effects.    L-spine MRI reviewed 5/2021 showing moderate degenerative changes progressed from previous.  Notably disc bulge with superimposed small right paracentral disc extrusion traversing 3 mm inferiorly narrowing the right lateral recess and abutting the descending right S1 nerve root. Mild bilateral facet arthropathy. Mild spinal canal stenosis. Moderate to severe right and severe left neural foraminal stenosis.     C-spine MRI multilevel degenerative changes, slight retrolisthesis C5-C6 asymmetry to the left.  Multiple level foraminal narrowing.  Osteophyte complex..     Pain Assessment   Location of Pain: Lower Back, R Hip, L Hip, L Leg, neck pain, joint  Description of Pain: Dull/Aching, Throbbing, Stabbing  Previous Pain Rating :7  Current  Pain Ratin  Aggravating Factors: Activity  Alleviating Factors: Rest, Medication    PEG Assessment   What number best describes your pain on average in the past week?10  What number best describes how, during the past week, pain has interfered with your enjoyment of life?6  What number best describes how, during the past week, pain has interfered with your general activity? 10     The following portions of the patient's history were reviewed and updated as appropriate: allergies, current medications, past family history, past medical history, past social history, past surgical history and problem list.     has a past medical history of Achilles tendinitis, GERD (gastroesophageal reflux disease), Groin discomfort, Hip pain, Hypertension, Leg pain, Low back pain, and Shoulder pain.   has a past surgical history that includes Rotator cuff repair; Shoulder surgery; and Neck surgery.  family history includes Alcohol abuse in her father; Cancer in her mother; Dementia in her father; Heart failure in her mother; Hypertension in her mother.  Social History     Tobacco Use    Smoking status: Never    Smokeless tobacco: Never   Substance Use Topics    Alcohol use: Not Currently       Review of Systems   Musculoskeletal:  Positive for back pain.        Left leg pain and weakness   All other systems reviewed and are negative.  Objective   Physical Exam  Vitals reviewed.   Constitutional:       General: She is not in acute distress.     Comments: Chair, right cam boot   Neck:      Meningeal: Kernig's sign present.   Pulmonary:      Effort: Pulmonary effort is normal.   Musculoskeletal:      Cervical back: Tenderness present. Decreased range of motion.      Thoracic back: Spasms and tenderness present.      Lumbar back: Spasms and tenderness present. Decreased range of motion. Positive left straight leg raise test.      Comments: Lumbar loading positive, pain on extension of low back past 5 degrees.  TTP on the lumbar facets  noted.  Left lumbar paraspinal tenderness   Neurological:      Gait: Gait abnormal.      Comments: Antalgic gait, right dorsiflexion strength 4 out of 5.     /88   Pulse 60   Resp 16   SpO2 94%      PHQ 9 on chart  Opioid risk tool low risk    Assessment & Plan   Diagnoses and all orders for this visit:    1. Chronic pain syndrome (Primary)  -     Ibuprofen 3 %, Baclofen 2 %, lidocaine 4 %, Ketamine HCl 4 %; Apply 1-2 g topically to the appropriate area as directed 3 (Three) to 4 (Four) times daily.  Dispense: 90 g; Refill: 5  -     HYDROcodone-acetaminophen (NORCO)  MG per tablet; Take 1 tablet by mouth 3 (Three) Times a Day As Needed for Severe Pain.  Dispense: 90 tablet; Refill: 0  -     HYDROcodone-acetaminophen (NORCO)  MG per tablet; Take 1 tablet by mouth 3 (Three) Times a Day As Needed for Severe Pain. DNF before 9/28/2023  Dispense: 90 tablet; Refill: 0    2. Lumbosacral spondylosis without myelopathy    3. Postlaminectomy syndrome of cervical region    4. Sacroiliitis    5. Thoracic spondylosis    6. High risk medication use    Summary  Rita Prado is a 58 y.o. female with polyarthralgia S/P bilateral shoulder surgeries, chronic back pain, neck pain S/P cervical fusion Oct 2018/Dr. Tai here for f/u.   Chronic pain from lumbar DDD spondylosis with occasional right lower extremity radicular pain..  Chronic neck pain postlaminectomy syndrome.    LESI last visit reports 80% relief with functional benefits, improved sleep.  She has seen podiatry for left foot pain, recommended cam boot for 6 to 8 weeks.  Currently wearing cam boot.  Complains of continued right-sided neck pain radiating to right shoulder/scapula.  Associated with paraspinal myofascial pain.  Consider cervical UBALDO.    Continue axial back pain and bilateral hip pain.  Bilateral hip x-ray without acute abnormalities.  Consider medial branch block/possible RFA.  Seeing neurosurgery, surgery discussed but no intervention  recommended at this time.    Continue hydrocodone 10/325 2-3 times daily as needed for severe pain.  UDS and inspect reviewed.   Discussed risk of tolerance, dependence, respiratory depression, coma and death associated with use of oral opioids for treatment of chronic nonmalignant pain.     RTC 2-3mo

## 2023-11-28 ENCOUNTER — OFFICE VISIT (OUTPATIENT)
Dept: PAIN MEDICINE | Facility: CLINIC | Age: 58
End: 2023-11-28
Payer: COMMERCIAL

## 2023-11-28 VITALS
OXYGEN SATURATION: 92 % | SYSTOLIC BLOOD PRESSURE: 135 MMHG | DIASTOLIC BLOOD PRESSURE: 84 MMHG | RESPIRATION RATE: 16 BRPM | HEART RATE: 87 BPM

## 2023-11-28 DIAGNOSIS — G89.4 CHRONIC PAIN SYNDROME: Primary | ICD-10-CM

## 2023-11-28 DIAGNOSIS — M47.817 LUMBOSACRAL SPONDYLOSIS WITHOUT MYELOPATHY: ICD-10-CM

## 2023-11-28 DIAGNOSIS — Z79.899 HIGH RISK MEDICATION USE: ICD-10-CM

## 2023-11-28 DIAGNOSIS — M79.18 MYOFASCIAL PAIN SYNDROME: ICD-10-CM

## 2023-11-28 DIAGNOSIS — M96.1 POSTLAMINECTOMY SYNDROME OF CERVICAL REGION: ICD-10-CM

## 2023-11-28 DIAGNOSIS — M54.12 CERVICAL RADICULITIS: ICD-10-CM

## 2023-11-28 RX ORDER — BACLOFEN 10 MG/1
10 TABLET ORAL 3 TIMES DAILY PRN
Qty: 270 TABLET | Refills: 1 | Status: SHIPPED | OUTPATIENT
Start: 2023-11-28

## 2023-11-28 RX ORDER — HYDROCODONE BITARTRATE AND ACETAMINOPHEN 10; 325 MG/1; MG/1
1 TABLET ORAL 3 TIMES DAILY PRN
Qty: 90 TABLET | Refills: 0 | Status: SHIPPED | OUTPATIENT
Start: 2023-12-17

## 2023-11-28 RX ORDER — HYDROCODONE BITARTRATE AND ACETAMINOPHEN 10; 325 MG/1; MG/1
1 TABLET ORAL 3 TIMES DAILY PRN
Qty: 90 TABLET | Refills: 0 | Status: SHIPPED | OUTPATIENT
Start: 2024-01-16

## 2023-11-28 RX ORDER — IBUPROFEN 800 MG/1
800 TABLET ORAL 2 TIMES DAILY PRN
Qty: 180 TABLET | Refills: 1 | Status: SHIPPED | OUTPATIENT
Start: 2023-11-28

## 2023-11-28 NOTE — PROGRESS NOTES
Subjective    CC back pain  Rita Prado is a 58 y.o. female with polyarthralgia S/P bilateral shoulder surgeries, chronic neck pain S/P ACDF Oct 2018/Dr. Tai, back pain, here for f/u.   Complains of worsening neck pain radiating to both shoulder and right arm/shoulder blade, constant but worse with any activity.  This is significantly impairing ADL and sleep.  Continues to follow-up with podiatry for right foot pain, now wearing ankle brace.  EMG/NCS recently done on right lower extremity.    Chronic mid back/thoracic back pain associated with significant paraspinal muscle spasm and pain.  Chronic lower back pain radiating to bilateral lower extremity usually radiating to right hip, right leg worse with standing prolonged sitting or activity.  Denies weakness, bladder bowel continence.  Chronic neck pain from postlaminectomy syndrome with paraspinal muscle spasm radiating to bilateral shoulders.     Pain interfere with ADL/sleep and work.    Good relief of LESI, transforaminal UBALDO, cervical UBALDO.    Utilizes hydrocodone with good relief of functional benefits and denies side effects.    L-spine MRI reviewed 2021 showing moderate degenerative changes progressed from previous.  Notably disc bulge with superimposed small right paracentral disc extrusion traversing 3 mm inferiorly narrowing the right lateral recess and abutting the descending right S1 nerve root. Mild bilateral facet arthropathy. Mild spinal canal stenosis. Moderate to severe right and severe left neural foraminal stenosis.     C-spine MRI multilevel degenerative changes, slight retrolisthesis C5-C6 asymmetry to the left.  Multiple level foraminal narrowing.  Osteophyte complex..     Pain Assessment   Location of Pain: Lower Back, R Hip, L Hip, L Leg, neck pain, joint  Description of Pain: Dull/Aching, Throbbing, Stabbing  Previous Pain Rating :6  Current Pain Ratin  Aggravating Factors: Activity  Alleviating Factors: Rest, Medication    PEG  Assessment   What number best describes your pain on average in the past week?10  What number best describes how, during the past week, pain has interfered with your enjoyment of life?5  What number best describes how, during the past week, pain has interfered with your general activity? 10     The following portions of the patient's history were reviewed and updated as appropriate: allergies, current medications, past family history, past medical history, past social history, past surgical history and problem list.     has a past medical history of Achilles tendinitis, GERD (gastroesophageal reflux disease), Groin discomfort, Hip pain, Hypertension, Leg pain, Low back pain, and Shoulder pain.   has a past surgical history that includes Rotator cuff repair; Shoulder surgery; and Neck surgery.  family history includes Alcohol abuse in her father; Cancer in her mother; Dementia in her father; Heart failure in her mother; Hypertension in her mother.  Social History     Tobacco Use    Smoking status: Never    Smokeless tobacco: Never   Substance Use Topics    Alcohol use: Not Currently       Review of Systems   Musculoskeletal:  Positive for back pain.        Left leg pain and weakness   All other systems reviewed and are negative.    Objective   Physical Exam  Vitals reviewed.   Constitutional:       General: She is not in acute distress.     Comments: Chair, right cam boot   Neck:      Meningeal: Kernig's sign present.   Pulmonary:      Effort: Pulmonary effort is normal.   Musculoskeletal:      Cervical back: Tenderness present. Decreased range of motion.      Thoracic back: Spasms and tenderness present.      Lumbar back: Spasms and tenderness present. Decreased range of motion. Positive left straight leg raise test.      Comments: Lumbar loading positive, pain on extension of low back past 5 degrees.  TTP on the lumbar facets noted.  Left lumbar paraspinal tenderness   Neurological:      Gait: Gait abnormal.       Comments: Antalgic gait, right dorsiflexion strength 4 out of 5.       /84   Pulse 87   Resp 16   SpO2 92%      PHQ 9 on chart  Opioid risk tool low risk    Assessment & Plan   Diagnoses and all orders for this visit:    1. Chronic pain syndrome (Primary)  -     baclofen (LIORESAL) 10 MG tablet; Take 1 tablet by mouth 3 (Three) Times a Day As Needed for Muscle Spasms.  Dispense: 270 tablet; Refill: 1  -     ibuprofen (ADVIL,MOTRIN) 800 MG tablet; Take 1 tablet by mouth 2 (Two) Times a Day As Needed for Mild Pain.  Dispense: 180 tablet; Refill: 1  -     HYDROcodone-acetaminophen (NORCO)  MG per tablet; Take 1 tablet by mouth 3 (Three) Times a Day As Needed for Severe Pain. DNF before 12/17/2023  Dispense: 90 tablet; Refill: 0  -     HYDROcodone-acetaminophen (NORCO)  MG per tablet; Take 1 tablet by mouth 3 (Three) Times a Day As Needed for Severe Pain. DNF before 1/16/2023  Dispense: 90 tablet; Refill: 0    2. Lumbosacral spondylosis without myelopathy    3. Postlaminectomy syndrome of cervical region    4. Cervical radiculitis  -     Epidural Block    5. Myofascial pain syndrome  -     baclofen (LIORESAL) 10 MG tablet; Take 1 tablet by mouth 3 (Three) Times a Day As Needed for Muscle Spasms.  Dispense: 270 tablet; Refill: 1    6. High risk medication use  -     Urine Drug Screen - Urine, Clean Catch; Future    Summary  Rita Prado is a 58 y.o. female with polyarthralgia S/P bilateral shoulder surgeries, chronic back pain, neck pain S/P cervical fusion Oct 2018/Dr. Tai here for f/u.   Chronic pain from lumbar DDD spondylosis with occasional right lower extremity radicular pain..  Chronic neck pain postlaminectomy syndrome.    Complains of worsening neck pain radiating to both shoulder and right arm/shoulder blade, constant but worse with any activity.  This is significantly impairing ADL and sleep.  Continues to follow-up with podiatry for right foot pain, now wearing ankle brace.  EMG/NCS  recently done on right lower extremity.  Schedule for cervical UBALDO.  Risks and benefits discussed.    Continue hydrocodone 10/325 2-3 times daily as needed for severe pain.  UDS and inspect reviewed.   Discussed risk of tolerance, dependence, respiratory depression, coma and death associated with use of oral opioids for treatment of chronic nonmalignant pain.     Continue baclofen, ibuprofen as needed.    RTC 2-3mo

## 2023-11-29 NOTE — PROGRESS NOTES
Subjective    CC back pain  Rita Prado is a 57 y.o. female with polyarthralgia S/P bilateral shoulder surgeries, chronic neck pain S/P ACDF Oct 2018/Dr. Tai, back pain, here for f/u.   Caudal UBADLO last visit reports 75% relief with significant functional benefits.  Symptoms are gradually returning and she is requesting a repeat injection.  Continues to have good relief and functional benefit with hydrocodone denies any side effects.  Chronic mid back/thoracic back pain associated with significant paraspinal muscle spasm and pain.  Chronic lower back pain radiating to bilateral lower extremity usually radiating to right hip, right leg worse with standing prolonged sitting or activity.  Denies weakness, bladder bowel continence.  Chronic neck pain from postlaminectomy syndrome with paraspinal muscle spasm radiating to bilateral shoulders.     Pain interfere with ADL/sleep and work.    Good relief of LESI, transforaminal UBALDO, cervical UBALDO.    Utilizes baclofen and tramadol prescribed by PCP with mild relief.    L-spine MRI reviewed 2021 showing moderate degenerative changes progressed from previous.  Notably disc bulge with superimposed small right paracentral disc extrusion traversing 3 mm inferiorly narrowing the right lateral recess and abutting the descending right S1 nerve root. Mild bilateral facet arthropathy. Mild spinal canal stenosis. Moderate to severe right and severe left neural foraminal stenosis.     C-spine MRI multilevel degenerative changes, slight retrolisthesis C5-C6 asymmetry to the left.  Multiple level foraminal narrowing.  Osteophyte complex..     Pain Assessment   Location of Pain: Lower Back, R Hip, L Hip, L Leg, neck pain, joint  Description of Pain: Dull/Aching, Throbbing, Stabbing  Previous Pain Rating :10  Current Pain Ratin  Aggravating Factors: Activity  Alleviating Factors: Rest, Medication    PEG Assessment   What number best describes your pain on average in the past  week?7  What number best describes how, during the past week, pain has interfered with your enjoyment of life?4  What number best describes how, during the past week, pain has interfered with your general activity? 8     The following portions of the patient's history were reviewed and updated as appropriate: allergies, current medications, past family history, past medical history, past social history, past surgical history and problem list.     has a past medical history of Achilles tendinitis, GERD (gastroesophageal reflux disease), Hypertension, Low back pain, and Shoulder pain.   has a past surgical history that includes Rotator cuff repair; Shoulder surgery; and Neck surgery.  family history includes Alcohol abuse in her father; Cancer in her mother; Dementia in her father; Heart failure in her mother; Hypertension in her mother.  Social History     Tobacco Use   • Smoking status: Never   • Smokeless tobacco: Never   Substance Use Topics   • Alcohol use: Not Currently       Review of Systems   Musculoskeletal: Positive for back pain.        Left leg pain and weakness   All other systems reviewed and are negative.    Objective   Physical Exam  Vitals reviewed.   Constitutional:       General: She is not in acute distress.  Neck:      Meningeal: Kernig's sign present.   Pulmonary:      Effort: Pulmonary effort is normal.   Musculoskeletal:      Cervical back: Tenderness present. Decreased range of motion.      Thoracic back: Spasms and tenderness present.      Lumbar back: Spasms and tenderness present. Decreased range of motion. Positive right straight leg raise test.      Comments: Lumbar loading positive, pain on extension of low back past 5 degrees.  TTP on the lumbar facets noted.  Left lumbar paraspinal tenderness   Neurological:      Gait: Gait abnormal.      Comments: Antalgic gait, right dorsiflexion strength 4 out of 5.       /100   Pulse 96   Resp 16   SpO2 95%      PHQ 9 on chart  Opioid  risk tool low risk    Assessment & Plan   Diagnoses and all orders for this visit:    1. Chronic pain syndrome (Primary)  -     HYDROcodone-acetaminophen (NORCO)  MG per tablet; Take 1 tablet by mouth 3 (Three) Times a Day As Needed for Severe Pain.  Dispense: 90 tablet; Refill: 0  -     HYDROcodone-acetaminophen (NORCO)  MG per tablet; Take 1 tablet by mouth 3 (Three) Times a Day As Needed for Severe Pain. DNF before 2/17/2023  Dispense: 90 tablet; Refill: 0    2. Lumbosacral spondylosis without myelopathy    3. Lumbar radiculitis  -     Cancel: Epidural Block  -     Caudal or Epidural, Single Injection    4. Postlaminectomy syndrome of cervical region    5. High risk medication use    Summary  Rita Prado is a 57 y.o. female with polyarthralgia S/P bilateral shoulder surgeries, chronic back pain, neck pain S/P cervical fusion Oct 2018/Dr. Tai here for f/u.   Chronic pain from lumbar DDD spondylosis with occasional right lower extremity radicular pain..  Chronic neck pain postlaminectomy syndrome.    Caudal UBALDO last visit reports 75% relief with significant functional benefits.  Symptoms are gradually returning and she is requesting a repeat injection.  Continues to have good relief and functional benefit with hydrocodone denies any side effects.    Scheduled for repeat caudal UBALDO.  Risks and benefits discussed.    Change tramadol to hydrocodone 10/325 2-3 times daily as needed for severe pain.  UDS and inspect reviewed.   Discussed risk of tolerance, dependence, respiratory depression, coma and death associated with use of oral opioids for treatment of chronic nonmalignant pain.       RTC for procedure     Within functional limits

## 2023-12-11 ENCOUNTER — HOSPITAL ENCOUNTER (OUTPATIENT)
Dept: PAIN MEDICINE | Facility: HOSPITAL | Age: 58
Discharge: HOME | End: 2023-12-11
Payer: COMMERCIAL

## 2023-12-11 VITALS
HEIGHT: 65 IN | RESPIRATION RATE: 16 BRPM | SYSTOLIC BLOOD PRESSURE: 133 MMHG | DIASTOLIC BLOOD PRESSURE: 95 MMHG | OXYGEN SATURATION: 96 % | BODY MASS INDEX: 34.16 KG/M2 | WEIGHT: 205 LBS | TEMPERATURE: 97.1 F | HEART RATE: 79 BPM

## 2023-12-11 DIAGNOSIS — M54.12 CERVICAL RADICULITIS: Primary | ICD-10-CM

## 2023-12-11 DIAGNOSIS — R52 PAIN: ICD-10-CM

## 2023-12-11 DIAGNOSIS — M54.16 LUMBAR RADICULITIS: ICD-10-CM

## 2023-12-11 PROCEDURE — 77003 FLUOROGUIDE FOR SPINE INJECT: CPT

## 2023-12-11 PROCEDURE — 62321 NJX INTERLAMINAR CRV/THRC: CPT | Performed by: ANESTHESIOLOGY

## 2023-12-11 PROCEDURE — 25010000002 METHYLPREDNISOLONE PER 40 MG: Performed by: ANESTHESIOLOGY

## 2023-12-11 PROCEDURE — 25510000001 IOPAMIDOL 41 % SOLUTION: Performed by: ANESTHESIOLOGY

## 2023-12-11 RX ORDER — IOPAMIDOL 408 MG/ML
3 INJECTION, SOLUTION INTRATHECAL
Status: COMPLETED | OUTPATIENT
Start: 2023-12-11 | End: 2023-12-11

## 2023-12-11 RX ORDER — METHYLPREDNISOLONE ACETATE 40 MG/ML
40 INJECTION, SUSPENSION INTRA-ARTICULAR; INTRALESIONAL; INTRAMUSCULAR; SOFT TISSUE ONCE
Status: COMPLETED | OUTPATIENT
Start: 2023-12-11 | End: 2023-12-11

## 2023-12-11 RX ADMIN — METHYLPREDNISOLONE ACETATE 40 MG: 40 INJECTION, SUSPENSION INTRA-ARTICULAR; INTRALESIONAL; INTRAMUSCULAR; INTRASYNOVIAL; SOFT TISSUE at 08:57

## 2023-12-11 RX ADMIN — IOPAMIDOL 3 ML: 408 INJECTION, SOLUTION INTRATHECAL at 08:57

## 2023-12-11 NOTE — PROCEDURES
"Subjective    CC back pain, neck pain  Rita Prado is a 58 y.o. female with cervical radiculitis here for cervical UBALDO.  No anticoagulation    Pain Assessment   Location of Pain: Lower Back, R Hip, L Hip, R Leg,   Description of Pain: Dull/Aching, Throbbing, Stabbing  Previous Pain Rating :7  Current Pain Ratin  Aggravating Factors: Activity  Alleviating Factors: Rest, Medication    The following portions of the patient's history were reviewed and updated as appropriate: allergies, current medications, past family history, past medical history, past social history, past surgical history and problem list.    Review of Systems  As in HPI  Objective   Physical Exam  Vitals reviewed.   Constitutional:       General: She is not in acute distress.  Pulmonary:      Effort: Pulmonary effort is normal.       /95 (BP Location: Left arm, Patient Position: Sitting)   Pulse 79   Temp 97.1 °F (36.2 °C) (Skin)   Resp 16   Ht 165.1 cm (65\")   Wt 93 kg (205 lb)   SpO2 96%   BMI 34.11 kg/m²     Assessment & Plan    underwent cervical  UBALDO.    RTC as needed.    DATE OF PROCEDURE:  2023    PREOPERATIVE DIAGNOSIS:Cervical radiculitis    POSTOPERATIVE DIAGNOSIS: same    PROCEDURE PERFORMED:  cervical Epidural Steroid Injection    The patient presents with a history of   cervical degenerative disc disease  with  radiculitis. The patient presents today for a  cervical  epidural steroid injection at level C6/7. The patient understands the risks and benefits of the procedure and wishes to proceed.  The patient was seen in the preoperative area.  Patient's consent was obtained and updated.  Vitals were taken.  Patient was then brought to the procedure suite and placed in a prone position. The appropriate anatomic area was widely prepped with Chloraprep and draped in a sterile fashion. Noninvasive monitoring per routine anesthesia protocol was placed.  Under fluoroscopic guidance using  AP view a 20 gauge styleted " tuohy needle was passed through skin anesthetized with 1% Lidocaine without epinephrine.  The needle was advanced using the continuous loss of resistance to saline technique into the C6 epidural space. Needle tip placement in the epidural space was confirmed by loss of resistance and injection of 1 mL of  preservative free contrast.  Following this 7 mL of a solution containing  1 mL of 40 mg Depo-Medrol and 7 mL of preservative-free saline was carefully administered in the epidural space. Epidurogram following injection demonstrated dye spread as high as C4 and as low as T1. A sterile dressing was placed over the puncture site.    The patient tolerated the procedure with no complications . They were then brought to the post procedure area where they recovered nicely.

## 2023-12-11 NOTE — DISCHARGE INSTRUCTIONS

## 2023-12-12 ENCOUNTER — TELEPHONE (OUTPATIENT)
Dept: PAIN MEDICINE | Facility: HOSPITAL | Age: 58
End: 2023-12-12
Payer: COMMERCIAL

## 2023-12-12 NOTE — TELEPHONE ENCOUNTER
Post procedure phone call completed.  Pt states they are doing good and denies questions or concerns. Pain is at a 5.

## 2023-12-28 NOTE — PROGRESS NOTES
Subjective   History of Present Illness: Rita Prado is a 58 y.o. female is here today for follow-up on lumbar degenerative disc disease. Today patient reports she's still in pain.  She was last seen about 6 months ago and was having significant issues with her Achilles tendon and ankle.  She has recovered from that and continues to have severe pain.  Her primary issues are pain banding across her low back and into her buttocks as well as pain that radiates down her lower extremities right worse than left with pain that radiates into the side of the bottom of her foot.  She also feels that she is always pitched forward and having to push her hips forward in order to improve her alignment.  Her symptoms have now kept her out of work for about 9 months.  She is unable to do normal daily tasks.  The symptoms have been progressively worsening for at least the last couple years and she is undergone many caudal injections without any lasting relief.    Chief Complaint   Patient presents with    Lumbar DDD     Follow up          Previous treatment: Baclofen, Hydrocodone,Ibuprofen 3%+Baclofen 2%+ Lidocaine 4%+ Ketamine HCI 4%    Previous neurosurgery:      Previous injections: SI joint injections, epidural injections, caudal injection    The following portions of the patient's history were reviewed and updated as appropriate: allergies, current medications, past family history, past medical history, past social history, past surgical history, and problem list.    Review of Systems   Constitutional:  Positive for activity change.   Eyes: Negative.    Respiratory: Negative.     Cardiovascular: Negative.    Gastrointestinal: Negative.    Endocrine: Negative.    Genitourinary: Negative.    Musculoskeletal:  Positive for arthralgias, back pain, joint swelling and myalgias.   Allergic/Immunologic: Negative.    Neurological:  Positive for weakness and numbness (sharp, tingling, side or leg/knee and buttocks).  "  Psychiatric/Behavioral:  Positive for sleep disturbance.        Objective      /93   Pulse 83   Resp 18   Ht 165.1 cm (65\")   Wt 94 kg (207 lb 3.2 oz)   BMI 34.48 kg/m²    Body mass index is 34.48 kg/m².  Vitals:    01/03/24 1403   PainSc:   6   PainLoc: Back           Neurologic Exam    Assessment & Plan   Independent Review of Radiographic Studies:      I personally reviewed and interpreted the images from the following studies.    No new imaging    Medical Decision Making:      Rita Prado is a 58 y.o. female with progressive symptoms of low back pain and radiculopathy that of really progressed over the last several years especially over the last year causing significant limitation in her daily life.  I was concerned that her symptoms had flared up due to her right Achilles issue, however that is resolved and she is in worse pain now than she was before.  She has severe right radiculopathy in addition to the back pain and disability.  MRI demonstrates significant degenerative changes and foraminal stenosis from L2-S1.  Scoliosis x-ray also demonstrates 15 to 20 degree PI to LL mismatch and about 9 to 10 cm positive SVA.  At the time of the scoliosis x-ray, the patient says she was in significant pain so it is possible that the SVA is exaggerated.  To further evaluate her deformity, we will repeat the scoliosis x-ray.  Patient is also scheduled to undergo another caudal block, however I like her to change this to a L5-S1 TFESI to determine the extent to which her radicular symptoms are affecting her overall pain.  I will see her back when she has completed the injection and the imaging.  We could consider deformity correction which would likely consist of L2 to pelvis surgery versus simple L5-S1 foraminotomy, however I suspect she would need the larger deformity surgery to really improve her ongoing symptoms.      Diagnoses and all orders for this visit:    1. Lumbar radiculopathy (Primary)    2. " Lumbar degenerative disc disease    3. Spondylolisthesis of lumbar region      No follow-ups on file.    This patient was examined wearing appropriate personal protective equipment.                      Dr. Chris Presley IV    01/03/24  14:45 EST

## 2024-01-03 ENCOUNTER — OFFICE VISIT (OUTPATIENT)
Dept: NEUROSURGERY | Facility: CLINIC | Age: 59
End: 2024-01-03
Payer: COMMERCIAL

## 2024-01-03 VITALS
BODY MASS INDEX: 34.52 KG/M2 | DIASTOLIC BLOOD PRESSURE: 93 MMHG | WEIGHT: 207.2 LBS | HEIGHT: 65 IN | HEART RATE: 83 BPM | SYSTOLIC BLOOD PRESSURE: 137 MMHG | RESPIRATION RATE: 18 BRPM

## 2024-01-03 DIAGNOSIS — M43.16 SPONDYLOLISTHESIS OF LUMBAR REGION: ICD-10-CM

## 2024-01-03 DIAGNOSIS — M51.36 LUMBAR DEGENERATIVE DISC DISEASE: ICD-10-CM

## 2024-01-03 DIAGNOSIS — M54.16 LUMBAR RADICULOPATHY: Primary | ICD-10-CM

## 2024-01-05 ENCOUNTER — HOSPITAL ENCOUNTER (OUTPATIENT)
Dept: PAIN MEDICINE | Facility: HOSPITAL | Age: 59
Discharge: HOME | End: 2024-01-05
Payer: COMMERCIAL

## 2024-01-05 VITALS
SYSTOLIC BLOOD PRESSURE: 130 MMHG | HEART RATE: 80 BPM | BODY MASS INDEX: 34.49 KG/M2 | RESPIRATION RATE: 16 BRPM | DIASTOLIC BLOOD PRESSURE: 91 MMHG | HEIGHT: 65 IN | WEIGHT: 207 LBS | OXYGEN SATURATION: 97 % | TEMPERATURE: 97.3 F

## 2024-01-05 DIAGNOSIS — R52 PAIN: ICD-10-CM

## 2024-01-05 DIAGNOSIS — M54.16 LUMBAR RADICULITIS: Primary | ICD-10-CM

## 2024-01-05 PROCEDURE — 77003 FLUOROGUIDE FOR SPINE INJECT: CPT

## 2024-01-05 PROCEDURE — 25010000002 BUPIVACAINE (PF) 0.25 % SOLUTION: Performed by: ANESTHESIOLOGY

## 2024-01-05 PROCEDURE — 25510000001 IOPAMIDOL 41 % SOLUTION: Performed by: ANESTHESIOLOGY

## 2024-01-05 PROCEDURE — 25010000002 DEXAMETHASONE SODIUM PHOSPHATE 10 MG/ML SOLUTION: Performed by: ANESTHESIOLOGY

## 2024-01-05 RX ORDER — IOPAMIDOL 408 MG/ML
3 INJECTION, SOLUTION INTRATHECAL
Status: COMPLETED | OUTPATIENT
Start: 2024-01-05 | End: 2024-01-05

## 2024-01-05 RX ORDER — DEXAMETHASONE SODIUM PHOSPHATE 10 MG/ML
10 INJECTION, SOLUTION INTRAMUSCULAR; INTRAVENOUS ONCE
Status: COMPLETED | OUTPATIENT
Start: 2024-01-05 | End: 2024-01-05

## 2024-01-05 RX ORDER — BUPIVACAINE HYDROCHLORIDE 2.5 MG/ML
10 INJECTION, SOLUTION EPIDURAL; INFILTRATION; INTRACAUDAL ONCE
Status: COMPLETED | OUTPATIENT
Start: 2024-01-05 | End: 2024-01-05

## 2024-01-05 RX ADMIN — BUPIVACAINE HYDROCHLORIDE 10 ML: 2.5 INJECTION, SOLUTION EPIDURAL; INFILTRATION; INTRACAUDAL; PERINEURAL at 11:51

## 2024-01-05 RX ADMIN — DEXAMETHASONE SODIUM PHOSPHATE 10 MG: 10 INJECTION, SOLUTION INTRAMUSCULAR; INTRAVENOUS at 11:51

## 2024-01-05 RX ADMIN — IOPAMIDOL 3 ML: 408 INJECTION, SOLUTION INTRATHECAL at 11:46

## 2024-01-05 NOTE — DISCHARGE INSTRUCTIONS

## 2024-01-08 ENCOUNTER — TELEPHONE (OUTPATIENT)
Dept: PAIN MEDICINE | Facility: HOSPITAL | Age: 59
End: 2024-01-08
Payer: COMMERCIAL

## 2024-01-08 NOTE — PROCEDURES
"Subjective    CC back pain, right leg pain  Rita Prado is a 58 y.o. female with lumbosacral radiculitis here for right L5 and S1 transforaminal UBALDO.  Requested by neurosurgery.  No anticoagulation    Pain Assessment   Location of Pain: Lower Back, R Hip, L Hip, R Leg,   Description of Pain: Dull/Aching, Throbbing, Stabbing  Previous Pain Rating :7  Current Pain Ratin  Aggravating Factors: Activity  Alleviating Factors: Rest, Medication    The following portions of the patient's history were reviewed and updated as appropriate: allergies, current medications, past family history, past medical history, past social history, past surgical history and problem list.    Review of Systems  As in HPI  Objective   Physical Exam  Constitutional:       General: She is not in acute distress.     Appearance: She is well-developed.   Pulmonary:      Effort: Pulmonary effort is normal.       /91 (BP Location: Right arm, Patient Position: Sitting)   Pulse 80   Temp 97.3 °F (36.3 °C) (Skin)   Resp 16   Ht 165.1 cm (65\")   Wt 93.9 kg (207 lb)   SpO2 97%   BMI 34.45 kg/m²     Assessment & Plan    underwent right L5 and S1 transforaminal UBALDO.  RTC as needed.    DATE OF PROCEDURE:  2024    PREOPERATIVE DIAGNOSIS:   Lumbar radiculitis    POSTOPERATIVE DIAGNOSIS: Same    PROCEDURE PERFORMED: Right L5  [id number] Epidural     The patient presents with a history of  lumbar degenerative disc disease with lumbosacral neuritis in the right leg at level [ L5, S1].  The patient presents today for a transforaminal epidural. The patient understands the risks and benefits of the procedure and wishes to proceed. The patient was seen in the preoperative area.  Patient's consent was obtained and updated.  Vitals were taken.  Patient was then brought to the procedure suite and placed in a prone position. Noninvasive monitoring per routine anesthesia protocol was placed.  The appropriate anatomic area was widely prepped " with Chloroprep and draped in a sterile fashion.  Under fluoroscopic guidance using an AP and lateral view, a 22 guage curved tip spinal needle  was passed through skin anesthetized with 1% Lidocaine without epinephrine. The needle tip was advanced to the inferior medial aspect of the transverse process and carefully walked into the neuroforamin using an AP lateral view.  At no time were parathesias elicited.  At this point 2 mL of a solution containing  1 mL of 0.25% bupivacaine and 1 mL of 10 mg Decadron were injected.  Clear epidural spread using 0.25 mL of  preservative free contrast was obtained.  A sterile dressing was placed over the puncture site.    The patient tolerated the procedure with no complications . They were then brought to the post procedure area where they recovered nicely.    Discharge:  The patient will be discharged home in stable condition.   Patient understands to contact the Center with any post procedure questions or concerns.  Discharge instructions given by nursing staff.

## 2024-01-08 NOTE — TELEPHONE ENCOUNTER
Post procedure phone call completed.  Pt states they are doing good and denies questions or concerns. Pain is a 7

## 2024-01-11 ENCOUNTER — HOSPITAL ENCOUNTER (OUTPATIENT)
Dept: GENERAL RADIOLOGY | Facility: HOSPITAL | Age: 59
Discharge: HOME | End: 2024-01-11
Admitting: NEUROLOGICAL SURGERY
Payer: COMMERCIAL

## 2024-01-11 DIAGNOSIS — M54.16 LUMBAR RADICULOPATHY: ICD-10-CM

## 2024-01-11 PROCEDURE — 72082 X-RAY EXAM ENTIRE SPI 2/3 VW: CPT

## 2024-01-29 NOTE — PROGRESS NOTES
"Subjective   History of Present Illness: Rita Prado is a 58 y.o. female is here today for follow-up for back pain with new X-rays. Today patient reports right buttock pain into her leg and foot and she also reports the injection has not helped at all.  In speaking with her today she describes right-sided pain that begins over her buttock.  She is able to point to an area over her SI joint where the pain tends to emanate.  Pain is worse with activity and always seems to start over the SI joint    Chief Complaint   Patient presents with    Back Pain     Follow up           Previous treatment: Medrol Dosepack,Ibuprofen,Hydrocodone, Gabapentin    Previous neurosurgery:      Previous injections: Cervical epidural,L5-S1 TESI    The following portions of the patient's history were reviewed and updated as appropriate: allergies, current medications, past family history, past medical history, past social history, past surgical history, and problem list.    Review of Systems   Constitutional:  Positive for activity change.   HENT: Negative.     Eyes: Negative.    Respiratory: Negative.     Endocrine: Negative.    Genitourinary: Negative.    Musculoskeletal:  Positive for arthralgias, back pain and myalgias.   Skin: Negative.    Allergic/Immunologic: Negative.    Neurological:  Positive for weakness (right leg) and numbness (tingling/right leg).   Psychiatric/Behavioral:  Positive for sleep disturbance.        Objective      BP (!) 139/103   Pulse 67   Ht 165.1 cm (65\")   Wt 94.7 kg (208 lb 12.8 oz)   BMI 34.75 kg/m²    Body mass index is 34.75 kg/m².  Vitals:    01/31/24 1123   PainSc:   8           Neurologic Exam    4 out of 4 positive SI joint maneuvers    Assessment & Plan   Independent Review of Radiographic Studies:      I personally reviewed and interpreted the images from the following studies.    Scoliosis x-ray: Overall balanced in the coronal and sagittal planes.    Medical Decision Making:      Rita CECILIA " Eve is a 58 y.o. female with symptoms concerning for radiculopathy versus SI joint dysfunction.  Patient has been undergoing caudal injections for some time with some degree of pain relief.  We sent her for right L5-S1 TFESI which provided her with minimal relief.  Her symptoms are consistent with right-sided SI joint dysfunction, so we will send her for an injection for diagnostic and therapeutic purposes.  I will see her back when she completes this.  If she gets good relief we will repeat the injection.  If no relief with continued symptoms, I think it would be best to proceed with L5-S1 right-sided decompression and foraminotomy.    In terms of her scoliosis imaging, it appears that her sagittal imbalance seen on the initial scoliosis x-ray was due to her positioning due to severe pain.  Current x-ray shows good coronal and sagittal balance.  If we were to proceed with lumbar surgery, she would not need any sort of deformity correction.      Diagnoses and all orders for this visit:    1. Sacroiliitis (Primary)    2. Lumbar radiculopathy    3. Lumbar degenerative disc disease      No follow-ups on file.    This patient was examined wearing appropriate personal protective equipment.                      Dr. Chris Presley IV    01/31/24  12:08 EST

## 2024-01-31 ENCOUNTER — OFFICE VISIT (OUTPATIENT)
Dept: NEUROSURGERY | Facility: CLINIC | Age: 59
End: 2024-01-31
Payer: COMMERCIAL

## 2024-01-31 VITALS
SYSTOLIC BLOOD PRESSURE: 139 MMHG | BODY MASS INDEX: 34.79 KG/M2 | HEIGHT: 65 IN | WEIGHT: 208.8 LBS | HEART RATE: 67 BPM | DIASTOLIC BLOOD PRESSURE: 103 MMHG

## 2024-01-31 DIAGNOSIS — M54.16 LUMBAR RADICULOPATHY: ICD-10-CM

## 2024-01-31 DIAGNOSIS — M51.36 LUMBAR DEGENERATIVE DISC DISEASE: ICD-10-CM

## 2024-01-31 DIAGNOSIS — M46.1 SACROILIITIS: Primary | ICD-10-CM

## 2024-02-05 ENCOUNTER — HOSPITAL ENCOUNTER (OUTPATIENT)
Dept: PAIN MEDICINE | Facility: HOSPITAL | Age: 59
Discharge: HOME | End: 2024-02-05
Payer: COMMERCIAL

## 2024-02-05 VITALS
WEIGHT: 208 LBS | DIASTOLIC BLOOD PRESSURE: 96 MMHG | HEIGHT: 65 IN | RESPIRATION RATE: 16 BRPM | SYSTOLIC BLOOD PRESSURE: 136 MMHG | TEMPERATURE: 97.3 F | HEART RATE: 78 BPM | OXYGEN SATURATION: 97 % | BODY MASS INDEX: 34.66 KG/M2

## 2024-02-05 DIAGNOSIS — M46.1 SACROILIITIS: Primary | ICD-10-CM

## 2024-02-05 DIAGNOSIS — R52 PAIN: ICD-10-CM

## 2024-02-05 PROCEDURE — 25010000002 BUPIVACAINE (PF) 0.25 % SOLUTION: Performed by: ANESTHESIOLOGY

## 2024-02-05 PROCEDURE — 77003 FLUOROGUIDE FOR SPINE INJECT: CPT

## 2024-02-05 PROCEDURE — 27096 INJECT SACROILIAC JOINT: CPT | Performed by: ANESTHESIOLOGY

## 2024-02-05 PROCEDURE — 25510000001 IOPAMIDOL 41 % SOLUTION: Performed by: ANESTHESIOLOGY

## 2024-02-05 PROCEDURE — 25010000002 METHYLPREDNISOLONE PER 40 MG: Performed by: ANESTHESIOLOGY

## 2024-02-05 RX ORDER — METHYLPREDNISOLONE ACETATE 40 MG/ML
40 INJECTION, SUSPENSION INTRA-ARTICULAR; INTRALESIONAL; INTRAMUSCULAR; SOFT TISSUE ONCE
Status: COMPLETED | OUTPATIENT
Start: 2024-02-05 | End: 2024-02-05

## 2024-02-05 RX ORDER — BUPIVACAINE HYDROCHLORIDE 2.5 MG/ML
10 INJECTION, SOLUTION EPIDURAL; INFILTRATION; INTRACAUDAL ONCE
Status: COMPLETED | OUTPATIENT
Start: 2024-02-05 | End: 2024-02-05

## 2024-02-05 RX ORDER — IOPAMIDOL 408 MG/ML
3 INJECTION, SOLUTION INTRATHECAL
Status: COMPLETED | OUTPATIENT
Start: 2024-02-05 | End: 2024-02-05

## 2024-02-05 RX ADMIN — IOPAMIDOL 3 ML: 408 INJECTION, SOLUTION INTRATHECAL at 10:51

## 2024-02-05 RX ADMIN — BUPIVACAINE HYDROCHLORIDE 5 ML: 2.5 INJECTION, SOLUTION EPIDURAL; INFILTRATION; INTRACAUDAL; PERINEURAL at 10:51

## 2024-02-05 RX ADMIN — METHYLPREDNISOLONE ACETATE 40 MG: 40 INJECTION, SUSPENSION INTRA-ARTICULAR; INTRALESIONAL; INTRAMUSCULAR; INTRASYNOVIAL; SOFT TISSUE at 10:51

## 2024-02-06 ENCOUNTER — TELEPHONE (OUTPATIENT)
Dept: PAIN MEDICINE | Facility: HOSPITAL | Age: 59
End: 2024-02-06
Payer: COMMERCIAL

## 2024-02-06 NOTE — TELEPHONE ENCOUNTER
Post procedure phone call completed.  Pt states they are doing good and denies questions or concerns. Pain is at a 7 on 1-10 scale.

## 2024-02-06 NOTE — PROCEDURES
"Subjective    CC back pain  Rita Prado is a 59 y.o. female with right sacroiliitis here for repeat right SI injection.. No anticoagulation    Pain Assessment   Location of Pain: Lower Back, R Hip, L Hip, R Leg,   Description of Pain: Dull/Aching, Throbbing, Stabbing  Previous Pain Rating :7  Current Pain Ratin  Aggravating Factors: Activity  Alleviating Factors: Rest, Medication    The following portions of the patient's history were reviewed and updated as appropriate: allergies, current medications, past family history, past medical history, past social history, past surgical history and problem list.    Review of Systems  As in HPI  Objective   Physical Exam  Constitutional:       General: She is not in acute distress.     Appearance: She is well-developed.   Cardiovascular:      Rate and Rhythm: Normal rate.   Pulmonary:      Effort: Pulmonary effort is normal.   Musculoskeletal:      Lumbar back: Tenderness present. Decreased range of motion.   Neurological:      Mental Status: She is alert and oriented to person, place, and time.       /96 (BP Location: Left arm, Patient Position: Sitting)   Pulse 78   Temp 97.3 °F (36.3 °C)   Resp 16   Ht 165.1 cm (65\")   Wt 94.3 kg (208 lb)   SpO2 97%   BMI 34.61 kg/m²     Assessment & Plan    underwent repeat  right SI injection.    RTC as needed.    DATE OF PROCEDURE:  2024    PREOPERATIVE DIAGNOSIS: sacroiliitis    POSTOPERATIVE DIAGNOSIS: same    PROCEDURE PERFORMED: Right SACROILIAC JOINT INJECTION    The patient understands the risks and benefits of the procedure and wishes to proceed. The patient was seen in the preoperative area. Patient's consent was obtained and updated. Vitals were taken. Patient was then brought to the procedure suite and placed in prone position for sacroiliac joint injection. The appropriate anatomic area was widely prepped with Chloraprep and draped in a sterile fashion. Noninvasive monitoring per routine anesthesia " protocol was placed. Under fluoroscopic guidance using an AP view, a 22 gauge curved tip spinal needle was passed through skin anesthetized with 1% Lidocaine without epinephrine. The needle tip was guided to the lower pole of the joint using fluoroscopy. 1 mL of  preservative free contrast was injected into the joint to confirm location. A clear outline was obtained and 5 mL of steroid solution containing 4 mL 0.25% bupivacaine, and 1mL 40mg Depomedrol was injected. The patient tolerated with no keke-procedural complications.  A sterile dressing was placed over the puncture sites.

## 2024-02-20 ENCOUNTER — OFFICE VISIT (OUTPATIENT)
Dept: PAIN MEDICINE | Facility: CLINIC | Age: 59
End: 2024-02-20
Payer: COMMERCIAL

## 2024-02-20 VITALS
HEART RATE: 79 BPM | BODY MASS INDEX: 35.28 KG/M2 | SYSTOLIC BLOOD PRESSURE: 118 MMHG | OXYGEN SATURATION: 96 % | DIASTOLIC BLOOD PRESSURE: 84 MMHG | WEIGHT: 212 LBS | RESPIRATION RATE: 16 BRPM

## 2024-02-20 DIAGNOSIS — M96.1 POSTLAMINECTOMY SYNDROME OF CERVICAL REGION: ICD-10-CM

## 2024-02-20 DIAGNOSIS — M54.16 LUMBAR RADICULITIS: ICD-10-CM

## 2024-02-20 DIAGNOSIS — G89.4 CHRONIC PAIN SYNDROME: Primary | ICD-10-CM

## 2024-02-20 DIAGNOSIS — M47.817 LUMBOSACRAL SPONDYLOSIS WITHOUT MYELOPATHY: ICD-10-CM

## 2024-02-20 DIAGNOSIS — Z79.899 HIGH RISK MEDICATION USE: ICD-10-CM

## 2024-02-20 PROCEDURE — 99214 OFFICE O/P EST MOD 30 MIN: CPT | Performed by: ANESTHESIOLOGY

## 2024-02-20 RX ORDER — IBUPROFEN 800 MG/1
800 TABLET ORAL 2 TIMES DAILY PRN
Qty: 180 TABLET | Refills: 1 | Status: SHIPPED | OUTPATIENT
Start: 2024-02-20

## 2024-02-20 RX ORDER — GABAPENTIN 100 MG/1
300 CAPSULE ORAL 3 TIMES DAILY PRN
Qty: 180 CAPSULE | Refills: 1 | Status: SHIPPED | OUTPATIENT
Start: 2024-02-20

## 2024-02-20 RX ORDER — HYDROCODONE BITARTRATE AND ACETAMINOPHEN 10; 325 MG/1; MG/1
1 TABLET ORAL 3 TIMES DAILY PRN
Qty: 90 TABLET | Refills: 0 | Status: SHIPPED | OUTPATIENT
Start: 2024-03-20

## 2024-02-20 RX ORDER — HYDROCODONE BITARTRATE AND ACETAMINOPHEN 10; 325 MG/1; MG/1
1 TABLET ORAL 3 TIMES DAILY PRN
Qty: 90 TABLET | Refills: 0 | Status: SHIPPED | OUTPATIENT
Start: 2024-02-20

## 2024-02-20 NOTE — PROGRESS NOTES
Subjective    CC back pain  Rita Prado is a 59 y.o. female with polyarthralgia S/P bilateral shoulder surgeries, chronic neck pain S/P ACDF Oct 2018/Dr. Tai, back pain, here for f/u.   Right SI injection last visit reports 30 to 50% relief lasted 2 to 3 days.  Continues to have back pain today complains especially of left-sided back pain radiating to left buttock left thigh constant but worse with activity.  Occasionally she has pain on the right radiating to lumbosacral area.  Pain is interfering with ADL reported 80% relief after last 2 caudal UBALDO.  Continues to take hydrocodone with mild relief    Chronic mid back/thoracic back pain associated with significant paraspinal muscle spasm and pain.  Chronic lower back pain radiating to bilateral lower extremity usually radiating to right hip, right leg worse with standing prolonged sitting or activity.  Denies weakness, bladder bowel continence.  Chronic neck pain from postlaminectomy syndrome with paraspinal muscle spasm radiating to bilateral shoulders.     Pain interfere with ADL/sleep and work.   Good relief of caudal UBALDO, transforaminal UBALDO, cervical UBALDO.    Utilizes hydrocodone with good relief of functional benefits and denies side effects.    L-spine MRI reviewed 5/2021 showing moderate degenerative changes progressed from previous.  Notably disc bulge with superimposed small right paracentral disc extrusion traversing 3 mm inferiorly narrowing the right lateral recess and abutting the descending right S1 nerve root. Mild bilateral facet arthropathy. Mild spinal canal stenosis. Moderate to severe right and severe left neural foraminal stenosis.     C-spine MRI multilevel degenerative changes, slight retrolisthesis C5-C6 asymmetry to the left.  Multiple level foraminal narrowing.  Osteophyte complex..     Pain Assessment   Location of Pain: Lower Back, R Hip, L Hip, L/R Leg, neck pain, joint  Description of Pain: Dull/Aching, Throbbing, Stabbing  Previous  Pain Rating :7  Current Pain Rating: 10  Aggravating Factors: Activity  Alleviating Factors: Rest, Medication    PEG Assessment   What number best describes your pain on average in the past week?10  What number best describes how, during the past week, pain has interfered with your enjoyment of life?8  What number best describes how, during the past week, pain has interfered with your general activity? 10     The following portions of the patient's history were reviewed and updated as appropriate: allergies, current medications, past family history, past medical history, past social history, past surgical history and problem list.     has a past medical history of Achilles tendinitis, GERD (gastroesophageal reflux disease), Groin discomfort, Hip pain, Hypertension, Leg pain, Low back pain, and Shoulder pain.   has a past surgical history that includes Rotator cuff repair; Shoulder surgery; and Neck surgery.  family history includes Alcohol abuse in her father; Cancer in her mother; Dementia in her father; Heart failure in her mother; Hypertension in her mother.        Review of Systems   Musculoskeletal:  Positive for back pain.        Left leg pain and weakness   All other systems reviewed and are negative.    Objective   Physical Exam  Vitals reviewed.   Constitutional:       General: She is not in acute distress.  Pulmonary:      Effort: Pulmonary effort is normal.   Musculoskeletal:      Cervical back: Tenderness present. Decreased range of motion.      Lumbar back: Tenderness present. Decreased range of motion. Positive right straight leg raise test and positive left straight leg raise test.      Comments: Lumbar loading positive, pain on extension of low back past 5 degrees.  TTP on the lumbar facets noted.  Jasmin bilaterally, positive Gaenslen bilaterally, positive SI compression test bilaterally       /84   Pulse 79   Resp 16   Wt 96.2 kg (212 lb)   SpO2 96%   BMI 35.28 kg/m²      PHQ 9 on  chart  Opioid risk tool low risk    Assessment & Plan   Diagnoses and all orders for this visit:    1. Chronic pain syndrome (Primary)  -     gabapentin (NEURONTIN) 100 MG capsule; Take 3 capsules by mouth 3 (Three) Times a Day As Needed (pain).  Dispense: 180 capsule; Refill: 1  -     ibuprofen (ADVIL,MOTRIN) 800 MG tablet; Take 1 tablet by mouth 2 (Two) Times a Day As Needed for Mild Pain.  Dispense: 180 tablet; Refill: 1  -     HYDROcodone-acetaminophen (NORCO)  MG per tablet; Take 1 tablet by mouth 3 (Three) Times a Day As Needed for Severe Pain.  Dispense: 90 tablet; Refill: 0  -     HYDROcodone-acetaminophen (NORCO)  MG per tablet; Take 1 tablet by mouth 3 (Three) Times a Day As Needed for Severe Pain. DNF before 3/20/2024  Dispense: 90 tablet; Refill: 0    2. Lumbosacral spondylosis without myelopathy    3. Lumbar radiculitis  -     gabapentin (NEURONTIN) 100 MG capsule; Take 3 capsules by mouth 3 (Three) Times a Day As Needed (pain).  Dispense: 180 capsule; Refill: 1  -     Caudal or Epidural, Single Injection    4. Postlaminectomy syndrome of cervical region    5. High risk medication use    Summary  Rita Prado is a 59 y.o. female with polyarthralgia S/P bilateral shoulder surgeries, chronic back pain, neck pain S/P cervical fusion Oct 2018/Dr. Tai here for f/u.   Chronic pain from lumbar DDD spondylosis with occasional right lower extremity radicular pain..  Chronic neck pain postlaminectomy syndrome.  Repeat caudal as needed. Had 70% relief with last caudal lasting over 3 months.    Continues to have back pain today complains especially of left-sided back pain radiating to left buttock left thigh constant but worse with activity.  Occasionally she has pain on the right radiating to lumbosacral area.  Pain is interfering with ADL reported 70-80% relief after last 2 caudal UBALDO.  Scheduled for repeat caudal UBALDO.  Risks and benefits discussed.    Keep follow-up with neurosurgery.  Discussing  possible surgery.    Continues to take hydrocodone with mild relief  Continue hydrocodone 10/325 2-3 times daily as needed for severe pain.  UDS and inspect reviewed.   Discussed risk of tolerance, dependence, respiratory depression, coma and death associated with use of oral opioids for treatment of chronic nonmalignant pain.     Continue baclofen, ibuprofen as needed.    RTC 2-3mo or for procedure

## 2024-02-26 ENCOUNTER — HOSPITAL ENCOUNTER (OUTPATIENT)
Dept: PAIN MEDICINE | Facility: HOSPITAL | Age: 59
Discharge: HOME | End: 2024-02-26
Payer: COMMERCIAL

## 2024-02-26 VITALS
OXYGEN SATURATION: 97 % | HEIGHT: 65 IN | TEMPERATURE: 97.3 F | WEIGHT: 212 LBS | RESPIRATION RATE: 16 BRPM | SYSTOLIC BLOOD PRESSURE: 142 MMHG | DIASTOLIC BLOOD PRESSURE: 98 MMHG | BODY MASS INDEX: 35.32 KG/M2 | HEART RATE: 63 BPM

## 2024-02-26 DIAGNOSIS — M54.16 LUMBAR RADICULITIS: Primary | ICD-10-CM

## 2024-02-26 DIAGNOSIS — R52 PAIN: ICD-10-CM

## 2024-02-26 PROCEDURE — 25010000002 METHYLPREDNISOLONE PER 40 MG: Performed by: ANESTHESIOLOGY

## 2024-02-26 PROCEDURE — 25510000001 IOPAMIDOL 41 % SOLUTION: Performed by: ANESTHESIOLOGY

## 2024-02-26 PROCEDURE — 25010000002 BUPIVACAINE (PF) 0.25 % SOLUTION: Performed by: ANESTHESIOLOGY

## 2024-02-26 PROCEDURE — 77003 FLUOROGUIDE FOR SPINE INJECT: CPT

## 2024-02-26 PROCEDURE — 62323 NJX INTERLAMINAR LMBR/SAC: CPT | Performed by: ANESTHESIOLOGY

## 2024-02-26 RX ORDER — BUPIVACAINE HYDROCHLORIDE 2.5 MG/ML
10 INJECTION, SOLUTION EPIDURAL; INFILTRATION; INTRACAUDAL ONCE
Status: COMPLETED | OUTPATIENT
Start: 2024-02-26 | End: 2024-02-26

## 2024-02-26 RX ORDER — IOPAMIDOL 408 MG/ML
3 INJECTION, SOLUTION INTRATHECAL
Status: COMPLETED | OUTPATIENT
Start: 2024-02-26 | End: 2024-02-26

## 2024-02-26 RX ORDER — METHYLPREDNISOLONE ACETATE 40 MG/ML
40 INJECTION, SUSPENSION INTRA-ARTICULAR; INTRALESIONAL; INTRAMUSCULAR; SOFT TISSUE ONCE
Status: COMPLETED | OUTPATIENT
Start: 2024-02-26 | End: 2024-02-26

## 2024-02-26 RX ADMIN — IOPAMIDOL 3 ML: 408 INJECTION, SOLUTION INTRATHECAL at 11:40

## 2024-02-26 RX ADMIN — BUPIVACAINE HYDROCHLORIDE 10 ML: 2.5 INJECTION, SOLUTION EPIDURAL; INFILTRATION; INTRACAUDAL; PERINEURAL at 11:40

## 2024-02-26 RX ADMIN — METHYLPREDNISOLONE ACETATE 40 MG: 40 INJECTION, SUSPENSION INTRA-ARTICULAR; INTRALESIONAL; INTRAMUSCULAR; INTRASYNOVIAL; SOFT TISSUE at 11:40

## 2024-02-26 NOTE — DISCHARGE INSTRUCTIONS

## 2024-02-27 ENCOUNTER — TELEPHONE (OUTPATIENT)
Dept: PAIN MEDICINE | Facility: HOSPITAL | Age: 59
End: 2024-02-27
Payer: COMMERCIAL

## 2024-02-27 NOTE — PROCEDURES
"Subjective    CC back pain  Rita Prado is a 59 y.o. female with lumbosacral radiculitis here for repeat  caudal UBALDO.  No anticoagulation    Pain Assessment   Location of Pain: Lower Back, R Hip, L Hip, R/L Leg,   Description of Pain: Dull/Aching, Throbbing, Stabbing  Previous Pain Rating :7  Current Pain Ratin  Aggravating Factors: Activity  Alleviating Factors: Rest, Medication    The following portions of the patient's history were reviewed and updated as appropriate: allergies, current medications, past family history, past medical history, past social history, past surgical history and problem list.    Review of Systems  As in HPI  Objective   Physical Exam  Constitutional:       General: She is not in acute distress.  Pulmonary:      Effort: Pulmonary effort is normal.       /98 (BP Location: Right arm, Patient Position: Sitting)   Pulse 63   Temp 97.3 °F (36.3 °C) (Skin)   Resp 16   Ht 165.1 cm (65\")   Wt 96.2 kg (212 lb)   SpO2 97%   BMI 35.28 kg/m²     Assessment & Plan    underwent repeat caudal UBALDO.    RTC as needed.    DATE OF PROCEDURE: 2024    PREOPERATIVE DIAGNOSIS:  lumbosacral DDD and radiculitis    POSTOPERATIVE DIAGNOSIS: Same    PROCEDURE PERFORMED: Caudal Epidural Steroid Injection    The patient presents with a history of  lumbosacral degenerative disc disease with lumbosacral neuritis. The patient presents today for a caudal epidural steroid injection. The patient understands the risks and benefits of the procedure and wishes to proceed. The patient was seen in the preoperative area.  Patient's consent was obtained and updated.  Vitals were taken.  Patient was then brought to the procedure suite and placed in a prone position. The appropriate anatomic area was widely prepped with  Chloraprep and draped in a sterile fashion. Noninvasive monitoring per routine anesthesia protocol was placed.  Under fluoroscopic guidance using a lateral view a 22 guage curved spinal " needle was passed through skin anesthesized with 1% Lidocaine without epinephrine.  The needle was advanced through the sacral hiatus and into the sacral epidural space using fluoroscopic guidance. Needle tip placement in the epidural space was confirmed by loss of resistance and injection of  1.5  mL of  preservative free contrast. Following this 10 mL of a solution containing  1 mL of 40 mg Depo-Medrol,  1 mL of  0.25% bupivacaine and 8 mL of preservative-free saline was carefully administered in the epidural space.   A sterile dressing was placed over the puncture site.    The patient tolerated the procedure with  no complications. They were then brought to the post procedure area where they recovered nicely.     Discharge:  The patient will be discharged home in stable condition.   Patient understands to contact the Center with any post procedure questions or concerns.  Discharge instructions given by nursing staff.

## 2024-03-21 ENCOUNTER — OFFICE VISIT (OUTPATIENT)
Dept: FAMILY MEDICINE CLINIC | Facility: CLINIC | Age: 59
End: 2024-03-21
Payer: COMMERCIAL

## 2024-03-21 VITALS
DIASTOLIC BLOOD PRESSURE: 78 MMHG | HEIGHT: 65 IN | BODY MASS INDEX: 35.16 KG/M2 | SYSTOLIC BLOOD PRESSURE: 128 MMHG | OXYGEN SATURATION: 94 % | TEMPERATURE: 98.5 F | HEART RATE: 80 BPM | WEIGHT: 211 LBS

## 2024-03-21 DIAGNOSIS — Z11.59 NEED FOR HEPATITIS C SCREENING TEST: ICD-10-CM

## 2024-03-21 DIAGNOSIS — H61.21 EXCESSIVE CERUMEN IN EAR CANAL, RIGHT: ICD-10-CM

## 2024-03-21 DIAGNOSIS — G89.29 CHRONIC NECK PAIN: ICD-10-CM

## 2024-03-21 DIAGNOSIS — M54.16 LUMBAR RADICULOPATHY: ICD-10-CM

## 2024-03-21 DIAGNOSIS — Z12.11 SCREENING FOR COLORECTAL CANCER: ICD-10-CM

## 2024-03-21 DIAGNOSIS — K21.9 GASTROESOPHAGEAL REFLUX DISEASE, UNSPECIFIED WHETHER ESOPHAGITIS PRESENT: ICD-10-CM

## 2024-03-21 DIAGNOSIS — Z12.12 SCREENING FOR COLORECTAL CANCER: ICD-10-CM

## 2024-03-21 DIAGNOSIS — E78.5 HYPERLIPIDEMIA, UNSPECIFIED HYPERLIPIDEMIA TYPE: ICD-10-CM

## 2024-03-21 DIAGNOSIS — E55.9 VITAMIN D DEFICIENCY: ICD-10-CM

## 2024-03-21 DIAGNOSIS — I10 PRIMARY HYPERTENSION: ICD-10-CM

## 2024-03-21 DIAGNOSIS — E66.01 CLASS 2 SEVERE OBESITY DUE TO EXCESS CALORIES WITH SERIOUS COMORBIDITY AND BODY MASS INDEX (BMI) OF 35.0 TO 35.9 IN ADULT: ICD-10-CM

## 2024-03-21 DIAGNOSIS — Z76.89 ENCOUNTER TO ESTABLISH CARE: Primary | ICD-10-CM

## 2024-03-21 DIAGNOSIS — R13.10 DYSPHAGIA, UNSPECIFIED TYPE: ICD-10-CM

## 2024-03-21 DIAGNOSIS — R73.09 ELEVATED HEMOGLOBIN A1C: ICD-10-CM

## 2024-03-21 DIAGNOSIS — M54.2 CHRONIC NECK PAIN: ICD-10-CM

## 2024-03-21 PROBLEM — E66.812 CLASS 2 SEVERE OBESITY DUE TO EXCESS CALORIES WITH SERIOUS COMORBIDITY AND BODY MASS INDEX (BMI) OF 35.0 TO 35.9 IN ADULT: Status: ACTIVE | Noted: 2024-03-21

## 2024-03-21 RX ORDER — METOPROLOL TARTRATE AND HYDROCHLOROTHIAZIDE 50; 25 MG/1; MG/1
1 TABLET ORAL DAILY
Qty: 90 TABLET | Refills: 0 | Status: SHIPPED | OUTPATIENT
Start: 2024-03-21

## 2024-03-21 RX ORDER — FAMOTIDINE 40 MG/1
40 TABLET, FILM COATED ORAL DAILY
Qty: 90 TABLET | Refills: 0 | Status: SHIPPED | OUTPATIENT
Start: 2024-03-21

## 2024-03-21 NOTE — PATIENT INSTRUCTIONS
Call office for lab results if you have not received a call from our office or [email or web address] message in 1-2 days.    Continue current medications and treatment.   Follow up with GI for gerd, dysphagia, to get colonoscopy.  Chew food well, eat small bites, go to ER if drooling or unable to swallow.  Will review records from Dr. Saab when available - states she had normal pap about a year ago.   Follow up with pain management and neurosurgery per their recommendations.   Take blood pressure at home twice daily, keep record, bring record with you to clinic.  Patient declines zoloft.

## 2024-03-21 NOTE — PROGRESS NOTES
"Subjective        Rita Prado is a 59 y.o. female who presents to [facility].     Chief Complaint   Patient presents with    Establish Care       History of Present Illness    Patient presents to clinic to establish care.  This is my first time evaluating patient.     Lumbar radiculopathy/degenerative disc disease- followed by Dr. Razo.  Followed by neurosurgery Dr. Presley.  Patient states low back pain began around a year ago.  Pain is constant, can be severe, often radiates down her right buttock into her leg and foot.  She had injections that did not seem to help with the pain.  She takes gabapentin 300 mg by mouth 3 times daily as needed, ibuprofen 800 mg by mouth 2 times daily as needed, Norco 10 3 times daily as needed for pain, baclofen 10 mg by mouth 3 times daily as needed.  Chronic neck pain-status post ACDF in 2018 by Dr. Tai.  Elevated hemoglobin A1c - last hemoglobin A1c ws 5.8 in 3/2022.  She was prescribed wegovy but couldn't get rx from the pharmacy, wants to get his now.   Hypertension - stable - takes metoprolol-hydrochlorothiazide 50-25mg po daily.  States blood pressures have been elevated at home around 150/90's recently.  She saw cardiology Dr. Carrasco in past but \"that was years ago.\"  She saw him for rapid heart rate, denies this at this time. Pt denies chest pain, shortness of breath, or ankle swelling.  Obesity - would like to lose weight.  BMI is 35.  Discussed with her weight management program at [facility], [place], operated by [facility] that encompasses everything from dietitian, nurse practitioners with medical management, surgeons if needed.  She declines evaluation at this time. She took phendimetrazine in past.   GERD - takes famotidine 40mg po daily, states she burps a lot, doesn't think the medicine works.  Tried pantoprazole in past but doesn't feel like it helped. She c/o intermittent dysphagia with liquids or solids for at least the past 1 year.  No pain with swallowing " "currently but does c/o pain when food gets stuck until \"it goes on down.\"   Hyperlipidemia - not on medications - last lipid panel 3/2022 with total cholesterol elevated 200, ldl elevated 114, triglycerides and hdl wnl.  Vitamin D deficiency -previously took vitamin D3 50,000 international units weekly but has not had this in quite some time.  Has not had labs checked recently.  Anxiety-feels like she has situational anxiety which is worst prior to procedures.  Only takes Valium 10 mg prior to having pain procedures.  Was told in the past that she had depression, has been treated with sertraline in the past.  Does not feel like this helped with any of her symptoms.  She does not wish to restart this or any other medication.    Patient states she is overdue for mammogram. She has not had previous colorectal cancer screening. She reports normal pap about a year ago from Dr. Saab, will request records.     The following portions of the patient's history were reviewed and updated as appropriate: allergies, current medications, past family history, past medical history, past social history, past surgical history and problem list.    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      Progesterone Nausea And Vomiting          Current Outpatient Medications:     baclofen (LIORESAL) 10 MG tablet, Take 1 tablet by mouth 3 (Three) Times a Day As Needed for Muscle Spasms., Disp: 270 tablet, Rfl: 1    diazePAM (VALIUM) 10 MG tablet, Take 1 tablet by mouth 2 (Two) Times a Day As Needed for Anxiety (takes before pain procedures)., Disp: , Rfl:     famotidine (PEPCID) 40 MG tablet, Take 1 tablet by mouth Daily., Disp: 90 tablet, Rfl: 0    gabapentin (NEURONTIN) 100 MG capsule, Take 3 capsules by mouth 3 (Three) Times a Day As Needed (pain)., Disp: 180 capsule, Rfl: 1    HYDROcodone-acetaminophen (NORCO)  MG per tablet, Take 1 tablet by mouth 3 (Three) Times " a Day As Needed for Severe Pain., Disp: 90 tablet, Rfl: 0    ibuprofen (ADVIL,MOTRIN) 800 MG tablet, Take 1 tablet by mouth 2 (Two) Times a Day As Needed for Mild Pain., Disp: 180 tablet, Rfl: 1    metoprolol-hydrochlorothiazide (LOPRESSOR HCT) 50-25 MG per tablet, Take 1 tablet by mouth Daily., Disp: 90 tablet, Rfl: 0    valACYclovir (VALTREX) 1000 MG tablet, valacyclovir 1 gram tablet  TAKE ONE (1) TABLET BY MOUTH DAILY, Disp: , Rfl:     cholecalciferol (VITAMIN D3) 1.25 MG (14777 UT) capsule, cholecalciferol (vitamin D3) 1,250 mcg (50,000 unit) capsule  TAKE ONE (1) CAPSULE BY MOUTH EVERY WEEK (Patient not taking: Reported on 3/21/2024), Disp: , Rfl:     HYDROcodone-acetaminophen (NORCO)  MG per tablet, Take 1 tablet by mouth 3 (Three) Times a Day As Needed for Severe Pain. DNF before 3/20/2024 (Patient not taking: Reported on 3/21/2024), Disp: 90 tablet, Rfl: 0    Ibuprofen 3 %, Baclofen 2 %, lidocaine 4 %, Ketamine HCl 4 %, Apply 1-2 g topically to the appropriate area as directed 3 (Three) to 4 (Four) times daily. (Patient not taking: Reported on 3/21/2024), Disp: 90 g, Rfl: 5    Phendimetrazine Tartrate 35 MG tablet, Take 1 tablet by mouth 3 (Three) Times a Day. (Patient not taking: Reported on 3/21/2024), Disp: , Rfl:     sertraline (ZOLOFT) 50 MG tablet, , Disp: , Rfl:     Wegovy 0.25 MG/0.5ML solution auto-injector, , Disp: , Rfl:     Review of Systems   Constitutional:  Negative for fever and unexpected weight change.   HENT:  Positive for trouble swallowing. Negative for dental problem.    Respiratory:  Negative for cough, shortness of breath, wheezing and stridor.    Cardiovascular:  Negative for chest pain, palpitations and leg swelling.   Gastrointestinal:  Negative for abdominal pain, nausea and vomiting.   Genitourinary:  Negative for difficulty urinating.   Musculoskeletal:  Positive for arthralgias, back pain and neck pain.   Skin:  Negative for color change and pallor.   Neurological:   "Negative for dizziness, seizures and syncope.   Psychiatric/Behavioral:  Negative for sleep disturbance.         Objective     /78 (BP Location: Left arm, Patient Position: Sitting) Comment: manual  Pulse 80   Temp 98.5 °F (36.9 °C) (Temporal)   Ht 165.1 cm (65\")   Wt 95.7 kg (211 lb)   SpO2 94%   BMI 35.11 kg/m²         Physical Exam  Vitals and nursing note reviewed.   Constitutional:       Appearance: Normal appearance. She is obese. She is not ill-appearing or diaphoretic.      Comments: Standing during exam, changing positions to try to get comfortable   HENT:      Head: Normocephalic and atraumatic.      Right Ear: External ear normal. There is impacted cerumen.      Left Ear: Tympanic membrane, ear canal and external ear normal. There is no impacted cerumen.      Nose: Nose normal. No congestion.      Mouth/Throat:      Mouth: Mucous membranes are moist.      Pharynx: Oropharynx is clear. No oropharyngeal exudate.   Eyes:      General: No scleral icterus.     Conjunctiva/sclera: Conjunctivae normal.      Pupils: Pupils are equal, round, and reactive to light.   Neck:      Vascular: No carotid bruit.   Cardiovascular:      Rate and Rhythm: Normal rate and regular rhythm.      Pulses: Normal pulses.      Heart sounds: Normal heart sounds.   Pulmonary:      Effort: Pulmonary effort is normal. No respiratory distress.      Breath sounds: Normal breath sounds. No wheezing or rales.   Abdominal:      General: Bowel sounds are normal. There is no distension.      Palpations: Abdomen is soft. There is no mass.      Tenderness: There is no abdominal tenderness. There is no guarding.   Musculoskeletal:      Lumbar back: Tenderness present. No swelling or bony tenderness. Positive right straight leg raise test.        Back:       Right lower leg: No edema.      Left lower leg: No edema.      Comments: Tenderness to palpation to right of center on right side   Lymphadenopathy:      Cervical: No cervical " adenopathy.   Skin:     General: Skin is warm and dry.      Capillary Refill: Capillary refill takes less than 2 seconds.      Coloration: Skin is not jaundiced.      Findings: No bruising.   Neurological:      Mental Status: She is alert and oriented to person, place, and time.      Gait: Gait normal.   Psychiatric:         Mood and Affect: Mood normal.         Behavior: Behavior normal.         Thought Content: Thought content normal.         Judgment: Judgment normal.         PHQ-2 Depression Screening  Little interest or pleasure in doing things? 0-->not at all   Feeling down, depressed, or hopeless? 1-->several days   PHQ-2 Total Score 1      Result Review                   Assessment & Plan    Diagnoses and all orders for this visit:    1. Encounter to establish care (Primary)    2. Dysphagia, unspecified type  -     Ambulatory Referral to Gastroenterology    3. Screening for colorectal cancer  -     Ambulatory Referral to Gastroenterology    4. Gastroesophageal reflux disease, unspecified whether esophagitis present  -     Ambulatory Referral to Gastroenterology  -     Comprehensive Metabolic Panel; Future  -     famotidine (PEPCID) 40 MG tablet; Take 1 tablet by mouth Daily.  Dispense: 90 tablet; Refill: 0    5. Class 2 severe obesity due to excess calories with serious comorbidity and body mass index (BMI) of 35.0 to 35.9 in adult  Assessment & Plan:  Patient's (Body mass index is 35.11 kg/m².) indicates that they are morbidly/severely obese (BMI > 40 or > 35 with obesity - related health condition) with health conditions that include hypertension and GERD . Weight is unchanged. BMI  is above average; BMI management plan is completed. We discussed portion control and increasing exercise.     Orders:  -     CBC & Differential; Future  -     Comprehensive Metabolic Panel; Future  -     TSH Rfx On Abnormal To Free T4; Future    6. Need for hepatitis C screening test  -     Hepatitis C Antibody; Future    7.  Primary hypertension  -     CBC & Differential; Future  -     TSH Rfx On Abnormal To Free T4; Future  -     ECG 12 Lead; Future  -     metoprolol-hydrochlorothiazide (LOPRESSOR HCT) 50-25 MG per tablet; Take 1 tablet by mouth Daily.  Dispense: 90 tablet; Refill: 0    8. Hyperlipidemia, unspecified hyperlipidemia type  -     Lipid Panel; Future    9. Elevated hemoglobin A1c  -     CBC & Differential; Future  -     Comprehensive Metabolic Panel; Future  -     Hemoglobin A1c; Future    10. Vitamin D deficiency  -     Vitamin D,25-Hydroxy; Future    11. Excessive cerumen in ear canal, right    12. Lumbar radiculopathy    13. Chronic neck pain       Patient Instructions   Call office for lab results if you have not received a call from our office or [employer] message in 1-2 days.    Continue current medications and treatment.   Follow up with GI for gerd, dysphagia, to get colonoscopy.  Chew food well, eat small bites, go to ER if drooling or unable to swallow.  Will review records from Dr. Saab when available - states she had normal pap about a year ago.   Follow up with pain management and neurosurgery per their recommendations.   Take blood pressure at home twice daily, keep record, bring record with you to clinic.  Patient declines zoloft.   Advised to use debrox otc per package instructions for increased cerumen in right ear. Do not place qtips or other objects in ear.   Will calculate 10 year ascvd risk when labs available.     Follow Up   Return in about 6 weeks (around 5/2/2024) for Annual physical, Recheck, Hypertension, Hyperlipidemia.    Patient was given instructions and counseling regarding her condition or for health maintenance advice. Please see specific information pulled into the AVS if appropriate.     Radha Marsh, APRN     03/22/24

## 2024-03-21 NOTE — ASSESSMENT & PLAN NOTE
Patient's (Body mass index is 35.11 kg/m².) indicates that they are morbidly/severely obese (BMI > 40 or > 35 with obesity - related health condition) with health conditions that include hypertension and GERD . Weight is unchanged. BMI  is above average; BMI management plan is completed. We discussed portion control and increasing exercise.

## 2024-03-22 ENCOUNTER — HOSPITAL ENCOUNTER (OUTPATIENT)
Dept: CARDIOLOGY | Facility: HOSPITAL | Age: 59
Discharge: HOME | End: 2024-03-22
Payer: COMMERCIAL

## 2024-03-22 ENCOUNTER — LAB (OUTPATIENT)
Dept: LAB | Facility: HOSPITAL | Age: 59
End: 2024-03-22
Payer: COMMERCIAL

## 2024-03-22 DIAGNOSIS — E78.5 HYPERLIPIDEMIA, UNSPECIFIED HYPERLIPIDEMIA TYPE: ICD-10-CM

## 2024-03-22 DIAGNOSIS — K21.9 GASTROESOPHAGEAL REFLUX DISEASE, UNSPECIFIED WHETHER ESOPHAGITIS PRESENT: ICD-10-CM

## 2024-03-22 DIAGNOSIS — Z11.59 NEED FOR HEPATITIS C SCREENING TEST: ICD-10-CM

## 2024-03-22 DIAGNOSIS — E55.9 VITAMIN D DEFICIENCY: ICD-10-CM

## 2024-03-22 DIAGNOSIS — I10 PRIMARY HYPERTENSION: ICD-10-CM

## 2024-03-22 DIAGNOSIS — E66.01 CLASS 2 SEVERE OBESITY DUE TO EXCESS CALORIES WITH SERIOUS COMORBIDITY AND BODY MASS INDEX (BMI) OF 35.0 TO 35.9 IN ADULT: ICD-10-CM

## 2024-03-22 DIAGNOSIS — R73.09 ELEVATED HEMOGLOBIN A1C: ICD-10-CM

## 2024-03-22 LAB
25(OH)D3 SERPL-MCNC: 30.5 NG/ML (ref 30–100)
ALBUMIN SERPL-MCNC: 4.3 G/DL (ref 3.5–5.2)
ALBUMIN/GLOB SERPL: 1.4 G/DL
ALP SERPL-CCNC: 84 U/L (ref 39–117)
ALT SERPL W P-5'-P-CCNC: 18 U/L (ref 1–33)
ANION GAP SERPL CALCULATED.3IONS-SCNC: 6 MMOL/L (ref 5–15)
AST SERPL-CCNC: 17 U/L (ref 1–32)
BASOPHILS # BLD AUTO: 0.04 10*3/MM3 (ref 0–0.2)
BASOPHILS NFR BLD AUTO: 0.7 % (ref 0–1.5)
BILIRUB SERPL-MCNC: 0.3 MG/DL (ref 0–1.2)
BUN SERPL-MCNC: 13 MG/DL (ref 8–23)
BUN/CREAT SERPL: 16.7 (ref 7–25)
CALCIUM SPEC-SCNC: 9.4 MG/DL (ref 8.6–10.5)
CHLORIDE SERPL-SCNC: 105 MMOL/L (ref 98–107)
CHOLEST SERPL-MCNC: 194 MG/DL (ref 0–200)
CO2 SERPL-SCNC: 30 MMOL/L (ref 22–29)
CREAT SERPL-MCNC: 0.78 MG/DL (ref 0.57–1)
DEPRECATED RDW RBC AUTO: 42 FL (ref 37–54)
EGFRCR SERPLBLD CKD-EPI 2021: 86.5 ML/MIN/1.73
EOSINOPHIL # BLD AUTO: 0.1 10*3/MM3 (ref 0–0.4)
EOSINOPHIL NFR BLD AUTO: 1.7 % (ref 0.3–6.2)
ERYTHROCYTE [DISTWIDTH] IN BLOOD BY AUTOMATED COUNT: 12.6 % (ref 12.3–15.4)
GLOBULIN UR ELPH-MCNC: 3 GM/DL
GLUCOSE SERPL-MCNC: 96 MG/DL (ref 65–99)
HBA1C MFR BLD: 6.3 % (ref 4.8–5.6)
HCT VFR BLD AUTO: 41.2 % (ref 34–46.6)
HCV AB SER DONR QL: NORMAL
HDLC SERPL-MCNC: 52 MG/DL (ref 40–60)
HGB BLD-MCNC: 13.7 G/DL (ref 12–15.9)
IMM GRANULOCYTES # BLD AUTO: 0.01 10*3/MM3 (ref 0–0.05)
IMM GRANULOCYTES NFR BLD AUTO: 0.2 % (ref 0–0.5)
LDLC SERPL CALC-MCNC: 117 MG/DL (ref 0–100)
LDLC/HDLC SERPL: 2.19 {RATIO}
LYMPHOCYTES # BLD AUTO: 3.11 10*3/MM3 (ref 0.7–3.1)
LYMPHOCYTES NFR BLD AUTO: 53.4 % (ref 19.6–45.3)
MCH RBC QN AUTO: 30.4 PG (ref 26.6–33)
MCHC RBC AUTO-ENTMCNC: 33.3 G/DL (ref 31.5–35.7)
MCV RBC AUTO: 91.6 FL (ref 79–97)
MONOCYTES # BLD AUTO: 0.52 10*3/MM3 (ref 0.1–0.9)
MONOCYTES NFR BLD AUTO: 8.9 % (ref 5–12)
NEUTROPHILS NFR BLD AUTO: 2.04 10*3/MM3 (ref 1.7–7)
NEUTROPHILS NFR BLD AUTO: 35.1 % (ref 42.7–76)
NRBC BLD AUTO-RTO: 0 /100 WBC (ref 0–0.2)
PLATELET # BLD AUTO: 356 10*3/MM3 (ref 140–450)
PMV BLD AUTO: 9.9 FL (ref 6–12)
POTASSIUM SERPL-SCNC: 4.3 MMOL/L (ref 3.5–5.2)
PROT SERPL-MCNC: 7.3 G/DL (ref 6–8.5)
QT INTERVAL: 399 MS
QTC INTERVAL: 435 MS
RBC # BLD AUTO: 4.5 10*6/MM3 (ref 3.77–5.28)
SODIUM SERPL-SCNC: 141 MMOL/L (ref 136–145)
TRIGL SERPL-MCNC: 140 MG/DL (ref 0–150)
TSH SERPL DL<=0.05 MIU/L-ACNC: 1.04 UIU/ML (ref 0.27–4.2)
VLDLC SERPL-MCNC: 25 MG/DL (ref 5–40)
WBC NRBC COR # BLD AUTO: 5.82 10*3/MM3 (ref 3.4–10.8)

## 2024-03-22 PROCEDURE — 36415 COLL VENOUS BLD VENIPUNCTURE: CPT

## 2024-03-22 PROCEDURE — 93005 ELECTROCARDIOGRAM TRACING: CPT | Performed by: NURSE PRACTITIONER

## 2024-03-22 PROCEDURE — 83036 HEMOGLOBIN GLYCOSYLATED A1C: CPT

## 2024-03-22 PROCEDURE — 86803 HEPATITIS C AB TEST: CPT

## 2024-03-22 PROCEDURE — 82306 VITAMIN D 25 HYDROXY: CPT

## 2024-03-22 PROCEDURE — 80061 LIPID PANEL: CPT

## 2024-03-22 PROCEDURE — 80050 GENERAL HEALTH PANEL: CPT

## 2024-03-29 DIAGNOSIS — M54.16 LUMBAR RADICULITIS: ICD-10-CM

## 2024-03-29 DIAGNOSIS — G89.4 CHRONIC PAIN SYNDROME: ICD-10-CM

## 2024-03-29 RX ORDER — GABAPENTIN 100 MG/1
CAPSULE ORAL
Qty: 180 CAPSULE | Refills: 1 | OUTPATIENT
Start: 2024-03-29

## 2024-04-02 NOTE — PROGRESS NOTES
"Subjective   History of Present Illness: Rita Prado is a 59 y.o. female is here today for follow-up.  The patient underwent a right SI joint injection which provided minimal to no relief.  She continues to have pain that radiates from her buttock down her posterior and lateral thigh and into her foot.    Chief Complaint   Patient presents with    Back Pain          Previous treatment: norco,gabapentin,cream    Previous neurosurgery:      Previous injections: SI joint injection, TFESI, caudal injection    The following portions of the patient's history were reviewed and updated as appropriate: allergies, current medications, past family history, past medical history, past social history, past surgical history, and problem list.    Review of Systems   Constitutional:  Positive for activity change.   HENT: Negative.     Eyes: Negative.    Cardiovascular:  Positive for leg swelling (at night).   Gastrointestinal: Negative.    Endocrine: Negative.    Genitourinary: Negative.    Musculoskeletal:  Positive for arthralgias, back pain (in her tail bone and goes to gorin) and myalgias.   Skin: Negative.    Allergic/Immunologic: Negative.    Neurological:  Positive for numbness (+tingling is constant).        Sharp and ache that goes from back to legs constant   She been holding on her butt more to yanet help take pressure off    Hematological: Negative.    Psychiatric/Behavioral:  Positive for sleep disturbance (hard to roll over).        Objective      BP (!) 142/110   Pulse 80   Resp 16   Ht 165.1 cm (65\")   Wt 97.5 kg (215 lb)   SpO2 97%   BMI 35.78 kg/m²    Body mass index is 35.78 kg/m².  Vitals:    04/08/24 1215   PainSc:   7   PainLoc: Back           Neurologic Exam    Assessment & Plan   Independent Review of Radiographic Studies:      I personally reviewed and interpreted the images from the following studies.    No new image    Medical Decision Making:      Rita Prado is a 59 y.o. female we have been " working up for some time now for SI joint problems versus radiculopathy.  She got no relief from her SI joint injection.  Her symptoms are most consistent with L5 and S1 radiculopathy on the right she does have severe foraminal and lateral recess stenosis at L5-S1 on the right.  Given this and failure of all conservative measures we will plan for right L5-S1 foraminotomy.      Diagnoses and all orders for this visit:    1. Lumbar radiculopathy (Primary)    2. Lumbar degenerative disc disease      No follow-ups on file.    This patient was examined wearing appropriate personal protective equipment.                      Dr. Chris Presley IV    04/08/24  12:56 EDT

## 2024-04-08 ENCOUNTER — OFFICE VISIT (OUTPATIENT)
Dept: NEUROSURGERY | Facility: CLINIC | Age: 59
End: 2024-04-08
Payer: COMMERCIAL

## 2024-04-08 ENCOUNTER — PREP FOR SURGERY (OUTPATIENT)
Dept: OTHER | Facility: HOSPITAL | Age: 59
End: 2024-04-08
Payer: COMMERCIAL

## 2024-04-08 VITALS
HEART RATE: 80 BPM | RESPIRATION RATE: 16 BRPM | HEIGHT: 65 IN | BODY MASS INDEX: 35.82 KG/M2 | DIASTOLIC BLOOD PRESSURE: 110 MMHG | WEIGHT: 215 LBS | SYSTOLIC BLOOD PRESSURE: 142 MMHG | OXYGEN SATURATION: 97 %

## 2024-04-08 DIAGNOSIS — M54.16 LUMBAR RADICULOPATHY: Primary | ICD-10-CM

## 2024-04-08 DIAGNOSIS — M51.36 LUMBAR DEGENERATIVE DISC DISEASE: ICD-10-CM

## 2024-04-08 PROCEDURE — 99214 OFFICE O/P EST MOD 30 MIN: CPT | Performed by: NEUROLOGICAL SURGERY

## 2024-04-10 PROBLEM — M54.16 LUMBAR RADICULOPATHY: Status: ACTIVE | Noted: 2024-04-08

## 2024-04-23 ENCOUNTER — OFFICE VISIT (OUTPATIENT)
Dept: PAIN MEDICINE | Facility: CLINIC | Age: 59
End: 2024-04-23
Payer: COMMERCIAL

## 2024-04-23 VITALS
RESPIRATION RATE: 16 BRPM | HEART RATE: 85 BPM | BODY MASS INDEX: 35.78 KG/M2 | WEIGHT: 215 LBS | SYSTOLIC BLOOD PRESSURE: 126 MMHG | DIASTOLIC BLOOD PRESSURE: 89 MMHG | OXYGEN SATURATION: 94 %

## 2024-04-23 DIAGNOSIS — G89.4 CHRONIC PAIN SYNDROME: Primary | ICD-10-CM

## 2024-04-23 DIAGNOSIS — M54.16 LUMBAR RADICULITIS: ICD-10-CM

## 2024-04-23 DIAGNOSIS — M47.817 LUMBOSACRAL SPONDYLOSIS WITHOUT MYELOPATHY: ICD-10-CM

## 2024-04-23 DIAGNOSIS — Z79.899 HIGH RISK MEDICATION USE: ICD-10-CM

## 2024-04-23 DIAGNOSIS — M96.1 POSTLAMINECTOMY SYNDROME OF CERVICAL REGION: ICD-10-CM

## 2024-04-23 DIAGNOSIS — Z79.899 HIGH RISK MEDICATION USE: Primary | ICD-10-CM

## 2024-04-23 PROCEDURE — 99214 OFFICE O/P EST MOD 30 MIN: CPT | Performed by: ANESTHESIOLOGY

## 2024-04-23 RX ORDER — HYDROCODONE BITARTRATE AND ACETAMINOPHEN 10; 325 MG/1; MG/1
1 TABLET ORAL 3 TIMES DAILY PRN
Qty: 90 TABLET | Refills: 0 | Status: SHIPPED | OUTPATIENT
Start: 2024-04-23

## 2024-04-23 RX ORDER — IBUPROFEN 800 MG/1
800 TABLET ORAL 2 TIMES DAILY PRN
Qty: 180 TABLET | Refills: 1 | Status: SHIPPED | OUTPATIENT
Start: 2024-04-23

## 2024-04-23 RX ORDER — HYDROCODONE BITARTRATE AND ACETAMINOPHEN 10; 325 MG/1; MG/1
1 TABLET ORAL 3 TIMES DAILY PRN
Qty: 90 TABLET | Refills: 0 | Status: SHIPPED | OUTPATIENT
Start: 2024-05-22

## 2024-04-23 NOTE — PROGRESS NOTES
Subjective    CC back pain  Rita Prdao is a 59 y.o. female with polyarthralgia S/P bilateral shoulder surgeries, chronic neck pain S/P ACDF Oct 2018/Dr. Tai, back pain, here for f/u.     Repeat caudal UBALDO last visit with 50% relief only lasted a week or so.  Continues to have right-sided back pain with right lower extremity radicular pain significantly impairing ADL.  Scheduled for right L5-S1 foraminotomy with Dr. Presley, end of May.  Utilizing hydrocodone with good relief and functional benefits.  Denies side effects.    Continues to have back pain today complains especially of left-sided back pain radiating to left buttock left thigh constant but worse with activity.  Occasionally she has pain on the right radiating to lumbosacral area.  Pain is interfering with ADL reported 80% relief after last 2 caudal UBALDO.  Continues to take hydrocodone with mild relief    Chronic mid back/thoracic back pain associated with significant paraspinal muscle spasm and pain.  Chronic lower back pain radiating to bilateral lower extremity usually radiating to right hip, right leg worse with standing prolonged sitting or activity.  Denies weakness, bladder bowel continence.  Chronic neck pain from postlaminectomy syndrome with paraspinal muscle spasm radiating to bilateral shoulders.     Pain interfere with ADL/sleep and work.   Good relief of caudal UBALDO, transforaminal UBALDO, cervical UBALDO.    Utilizes hydrocodone with good relief of functional benefits and denies side effects.    L-spine MRI reviewed 5/2021 showing moderate degenerative changes progressed from previous.  Notably disc bulge with superimposed small right paracentral disc extrusion traversing 3 mm inferiorly narrowing the right lateral recess and abutting the descending right S1 nerve root. Mild bilateral facet arthropathy. Mild spinal canal stenosis. Moderate to severe right and severe left neural foraminal stenosis.     C-spine MRI multilevel degenerative changes,  slight retrolisthesis C5-C6 asymmetry to the left.  Multiple level foraminal narrowing.  Osteophyte complex..     Pain Assessment   Location of Pain: Lower Back, R Hip, L Hip, L/R Leg, neck pain, joint  Description of Pain: Dull/Aching, Throbbing, Stabbing  Previous Pain Rating :7  Current Pain Ratin  Aggravating Factors: Activity  Alleviating Factors: Rest, Medication    PEG Assessment   What number best describes your pain on average in the past week?7  What number best describes how, during the past week, pain has interfered with your enjoyment of life?7  What number best describes how, during the past week, pain has interfered with your general activity? 10     The following portions of the patient's history were reviewed and updated as appropriate: allergies, current medications, past family history, past medical history, past social history, past surgical history and problem list.     has a past medical history of Achilles tendinitis, Anxiety, Depression, Elevated hemoglobin A1c, GERD (gastroesophageal reflux disease), Groin discomfort, Hip pain, Hypertension, Leg pain, Low back pain, Obesity, and Shoulder pain.   has a past surgical history that includes Rotator cuff repair; Shoulder surgery (Left); and Neck surgery.  family history includes Alcohol abuse in her father; Breast cancer in her mother; Dementia in her father; Heart failure in her mother; Hypertension in her father and mother; Stroke in her father.        Review of Systems   Musculoskeletal:  Positive for back pain.        Left leg pain and weakness   All other systems reviewed and are negative.    Objective   Physical Exam  Vitals reviewed.   Constitutional:       General: She is not in acute distress.  Pulmonary:      Effort: Pulmonary effort is normal.   Musculoskeletal:      Cervical back: Tenderness present. Decreased range of motion.      Lumbar back: Tenderness present. Decreased range of motion. Positive right straight leg raise test  and positive left straight leg raise test.      Comments: Lumbar loading positive, pain on extension of low back past 5 degrees.  TTP on the lumbar facets noted.  Jasmin bilaterally, positive Gaenslen bilaterally, positive SI compression test bilaterally       /89   Pulse 85   Resp 16   Wt 97.5 kg (215 lb)   SpO2 94%   BMI 35.78 kg/m²      PHQ 9 on chart  Opioid risk tool low risk    Assessment & Plan   Diagnoses and all orders for this visit:    1. Chronic pain syndrome (Primary)  -     HYDROcodone-acetaminophen (NORCO)  MG per tablet; Take 1 tablet by mouth 3 (Three) Times a Day As Needed for Severe Pain.  Dispense: 90 tablet; Refill: 0  -     HYDROcodone-acetaminophen (NORCO)  MG per tablet; Take 1 tablet by mouth 3 (Three) Times a Day As Needed for Severe Pain. DNF before 5/22/2024  Dispense: 90 tablet; Refill: 0    2. Lumbosacral spondylosis without myelopathy    3. Lumbar radiculitis    4. Postlaminectomy syndrome of cervical region    5. High risk medication use    Summary  Rita Prado is a 59 y.o. female with polyarthralgia S/P bilateral shoulder surgeries, chronic back pain, neck pain S/P cervical fusion Oct 2018/Dr. Tai here for f/u.   Chronic pain from lumbar DDD spondylosis with occasional right lower extremity radicular pain..  Chronic neck pain postlaminectomy syndrome.  Repeat caudal as needed. Had 70% relief with last caudal lasting over 3 months.    Repeat caudal UBALDO last visit with 50% relief only lasted a week or so.  Continues to have right-sided back pain with right lower extremity radicular pain significantly impairing ADL.  Scheduled for right L5-S1 foraminotomy with Dr. Presley, end of May.  Utilizing hydrocodone with good relief and functional benefits.  Denies side effects.    Keep follow-up with neurosurgery.  Discussing possible surgery.    Continue hydrocodone 10/325 2-3 times daily as needed for severe pain.  UDS and inspect reviewed.   Discussed risk of  tolerance, dependence, respiratory depression, coma and death associated with use of oral opioids for treatment of chronic nonmalignant pain.     Continue baclofen, ibuprofen as needed.    RTC 2-3mo

## 2024-05-03 ENCOUNTER — OFFICE VISIT (OUTPATIENT)
Dept: FAMILY MEDICINE CLINIC | Facility: CLINIC | Age: 59
End: 2024-05-03
Payer: COMMERCIAL

## 2024-05-03 VITALS
SYSTOLIC BLOOD PRESSURE: 122 MMHG | OXYGEN SATURATION: 94 % | TEMPERATURE: 98.1 F | HEART RATE: 85 BPM | RESPIRATION RATE: 16 BRPM | WEIGHT: 214.4 LBS | HEIGHT: 65 IN | BODY MASS INDEX: 35.72 KG/M2 | DIASTOLIC BLOOD PRESSURE: 88 MMHG

## 2024-05-03 DIAGNOSIS — R73.03 PREDIABETES: Primary | ICD-10-CM

## 2024-05-03 DIAGNOSIS — Z12.31 ENCOUNTER FOR SCREENING MAMMOGRAM FOR MALIGNANT NEOPLASM OF BREAST: ICD-10-CM

## 2024-05-03 DIAGNOSIS — E66.01 CLASS 2 SEVERE OBESITY DUE TO EXCESS CALORIES WITH SERIOUS COMORBIDITY AND BODY MASS INDEX (BMI) OF 35.0 TO 35.9 IN ADULT: ICD-10-CM

## 2024-05-03 DIAGNOSIS — M54.16 LUMBAR RADICULOPATHY: ICD-10-CM

## 2024-05-03 DIAGNOSIS — E78.5 HYPERLIPIDEMIA, UNSPECIFIED HYPERLIPIDEMIA TYPE: ICD-10-CM

## 2024-05-03 DIAGNOSIS — R13.10 DYSPHAGIA, UNSPECIFIED TYPE: ICD-10-CM

## 2024-05-03 NOTE — PROGRESS NOTES
Subjective        Rita Prado is a 59 y.o. female who presents to [facility].     Chief Complaint   Patient presents with    Follow-up     6 wk FU, discuss labs    Procedure     Pt states she is due to have Sx 05/30 on back       History of Present Illness    Patient presents to clinic to discuss lab results and upcoming procedure on her back.    Prediabetes - stable -labs 3/2024 with elevated hemoglobin A1c 6.3.  She does not take medication.  Hyperlipidemia - stable - not on medication, most recent lipid panel 3/2024 with elevated , otherwise lipid panel within normal limits.  Calculated 10-year ASCVD risk of 7.2%. She does not wish to start statin therapy, wishes to try low cholesterol diet and weight loss.   Dysphagia - stable - was referred to gastroenterology at visit 3/2024 for eval of dysphagia and for screeening colonoscopy. She has not been seen yet by gastroenterology.  Lumbar radiculopathy/lumbar degenerative disc disease - followed by neurosurgeon dr. Presley, he is planning right L5-S1 foraminotomy after no relief of symptoms after SI joint injections.  Is followed by pain management Dr. Snyder, takes Norco 10 3 times daily as needed, gabapentin 300 mg 3 times daily as needed, baclofen 10 mg 3 times daily as needed, ibuprofen 800 mg by mouth twice daily as needed for pain.  She had EKG 3/2024 that showed sinus rhythm with significant axis or voltage or hypertrophic change since prior EKG in 2018.  I reviewed this EKG and surmise that lead placement may have contributed to these changes.  She previously worked as a technologist and performed EKGs, states she was worried about lead placement but the tech did not reposition the leads.  She has repeat EKG ordered by  prior to surgery.  [id number]-NPM 35.68.  Over a year ago a previous primary care provider started her on Wegovy 0.25 mg, she states she received 1 dose of this but was not able to obtain  anymore.  She also took phendimetrazine in past, is not currently taking this medication.  She is interested in losing weight because she feels that it will help improve her back pain and chronic medical conditions.    Patient has not had recent screening mammogram, would like to see if this can be ordered.    The following portions of the patient's history were reviewed and updated as appropriate: allergies, current medications, past family history, past medical history, past social history, past surgical history and problem list.    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      Progesterone Nausea And Vomiting          Current Outpatient Medications:     baclofen (LIORESAL) 10 MG tablet, Take 1 tablet by mouth 3 (Three) Times a Day As Needed for Muscle Spasms., Disp: 270 tablet, Rfl: 1    cholecalciferol (VITAMIN D3) 1.25 MG (37776 UT) capsule, , Disp: , Rfl:     diazePAM (VALIUM) 10 MG tablet, Take 1 tablet by mouth 2 (Two) Times a Day As Needed for Anxiety (takes before pain procedures)., Disp: , Rfl:     famotidine (PEPCID) 40 MG tablet, Take 1 tablet by mouth Daily., Disp: 90 tablet, Rfl: 0    gabapentin (NEURONTIN) 100 MG capsule, Take 3 capsules by mouth 3 (Three) Times a Day As Needed (pain)., Disp: 180 capsule, Rfl: 1    HYDROcodone-acetaminophen (NORCO)  MG per tablet, Take 1 tablet by mouth 3 (Three) Times a Day As Needed for Severe Pain., Disp: 90 tablet, Rfl: 0    [START ON 5/22/2024] HYDROcodone-acetaminophen (NORCO)  MG per tablet, Take 1 tablet by mouth 3 (Three) Times a Day As Needed for Severe Pain. DNF before 5/22/2024, Disp: 90 tablet, Rfl: 0    ibuprofen (ADVIL,MOTRIN) 800 MG tablet, Take 1 tablet by mouth 2 (Two) Times a Day As Needed for Mild Pain., Disp: 180 tablet, Rfl: 1    Ibuprofen 3 %, Baclofen 2 %, lidocaine 4 %, Ketamine HCl 4 %, Apply 1-2 g topically to the appropriate area as directed 3 (Three) to  "4 (Four) times daily., Disp: 90 g, Rfl: 5    metoprolol-hydrochlorothiazide (LOPRESSOR HCT) 50-25 MG per tablet, Take 1 tablet by mouth Daily., Disp: 90 tablet, Rfl: 0    sertraline (ZOLOFT) 50 MG tablet, , Disp: , Rfl:     valACYclovir (VALTREX) 1000 MG tablet, valacyclovir 1 gram tablet  TAKE ONE (1) TABLET BY MOUTH DAILY, Disp: , Rfl:     Phendimetrazine Tartrate 35 MG tablet, Take 1 tablet by mouth 3 (Three) Times a Day. (Patient not taking: Reported on 5/3/2024), Disp: , Rfl:     Wegovy 0.25 MG/0.5ML solution auto-injector, , Disp: , Rfl:     Review of Systems   Constitutional:  Positive for fatigue. Negative for fever and unexpected weight change.   HENT:  Negative for dental problem and trouble swallowing.    Eyes:  Negative for visual disturbance.   Respiratory:  Negative for cough, shortness of breath, wheezing and stridor.    Cardiovascular:  Negative for chest pain, palpitations and leg swelling.   Gastrointestinal:  Negative for abdominal pain, nausea and vomiting.   Musculoskeletal:  Positive for arthralgias and back pain.   Neurological:  Negative for dizziness and syncope.        Objective     /88 (BP Location: Left arm, Patient Position: Sitting) Comment: manual  Pulse 85   Temp 98.1 °F (36.7 °C) (Oral)   Resp 16   Ht 165.1 cm (65\")   Wt 97.3 kg (214 lb 6.4 oz)   SpO2 94%   BMI 35.68 kg/m²         Physical Exam  Vitals and nursing note reviewed.   Constitutional:       General: She is not in acute distress.     Appearance: Normal appearance. She is obese. She is not ill-appearing or diaphoretic.   HENT:      Head: Normocephalic and atraumatic.      Nose: Nose normal.      Mouth/Throat:      Mouth: Mucous membranes are moist.   Eyes:      General: No scleral icterus.     Conjunctiva/sclera: Conjunctivae normal.   Cardiovascular:      Rate and Rhythm: Normal rate and regular rhythm.      Pulses: Normal pulses.      Heart sounds: Normal heart sounds.   Pulmonary:      Effort: Pulmonary " effort is normal.      Breath sounds: Normal breath sounds.   Abdominal:      General: Bowel sounds are normal. There is no distension.      Palpations: Abdomen is soft.      Tenderness: There is no abdominal tenderness. There is no guarding or rebound.   Musculoskeletal:      Lumbar back: Tenderness present. No swelling, deformity or bony tenderness.        Back:       Right lower leg: No edema.      Left lower leg: No edema.   Skin:     General: Skin is warm and dry.      Capillary Refill: Capillary refill takes less than 2 seconds.      Coloration: Skin is not jaundiced.      Findings: No bruising.   Neurological:      Mental Status: She is alert and oriented to person, place, and time.      Gait: Gait normal.   Psychiatric:         Mood and Affect: Mood normal.         Behavior: Behavior normal.         Thought Content: Thought content normal.         Judgment: Judgment normal.              Result Review    The following data was reviewed by: KELLI Bryant on 05/03/2024:  Common labs          3/22/2024    12:30   Common Labs   Glucose 96    BUN 13    Creatinine 0.78    Sodium 141    Potassium 4.3    Chloride 105    Calcium 9.4    Albumin 4.3    Total Bilirubin 0.3    Alkaline Phosphatase 84    AST (SGOT) 17    ALT (SGPT) 18    WBC 5.82    Hemoglobin 13.7    Hematocrit 41.2    Platelets 356    Total Cholesterol 194    Triglycerides 140    HDL Cholesterol 52    LDL Cholesterol  117    Hemoglobin A1C 6.30      CMP          3/22/2024    12:30   CMP   Glucose 96    BUN 13    Creatinine 0.78    EGFR 86.5    Sodium 141    Potassium 4.3    Chloride 105    Calcium 9.4    Total Protein 7.3    Albumin 4.3    Globulin 3.0    Total Bilirubin 0.3    Alkaline Phosphatase 84    AST (SGOT) 17    ALT (SGPT) 18    Albumin/Globulin Ratio 1.4    BUN/Creatinine Ratio 16.7    Anion Gap 6.0      CBC          3/22/2024    12:30   CBC   WBC 5.82    RBC 4.50    Hemoglobin 13.7    Hematocrit 41.2    MCV 91.6    MCH 30.4    MCHC  33.3    RDW 12.6    Platelets 356      CBC w/diff          3/22/2024    12:30   CBC w/Diff   WBC 5.82    RBC 4.50    Hemoglobin 13.7    Hematocrit 41.2    MCV 91.6    MCH 30.4    MCHC 33.3    RDW 12.6    Platelets 356    Neutrophil Rel % 35.1    Immature Granulocyte Rel % 0.2    Lymphocyte Rel % 53.4    Monocyte Rel % 8.9    Eosinophil Rel % 1.7    Basophil Rel % 0.7      Lipid Panel          3/22/2024    12:30   Lipid Panel   Total Cholesterol 194    Triglycerides 140    HDL Cholesterol 52    VLDL Cholesterol 25    LDL Cholesterol  117    LDL/HDL Ratio 2.19      TSH          3/22/2024    12:30   TSH   TSH 1.040      BMP          3/22/2024    12:30   BMP   BUN 13    Creatinine 0.78    Sodium 141    Potassium 4.3    Chloride 105    CO2 30.0    Calcium 9.4      A1C Last 3 Results          3/22/2024    12:30   HGBA1C Last 3 Results   Hemoglobin A1C 6.30            Data reviewed : Cardiology studies      HEART RATE= 71  bpm  RR Interval= 840  ms  WA Interval= 173  ms  P Horizontal Axis= -13  deg  P Front Axis= 36  deg  QRSD Interval= 104  ms  QT Interval= 399  ms  QTcB= 435  ms  QRS Axis= 18  deg  T Wave Axis= 30  deg  - NORMAL ECG -  Sinus rhythm  When compared with ECG of 09-Oct-2018 10:28:00,  Significant axis, voltage or hypertrophy change  Electronically Signed By:   Date and Time of Study: 2024-03-22 12:35:20        Assessment & Plan    Diagnoses and all orders for this visit:    1. Prediabetes (Primary)    2. Hyperlipidemia, unspecified hyperlipidemia type    3. Dysphagia, unspecified type    4. Lumbar radiculopathy    5. Class 2 severe obesity due to excess calories with serious comorbidity and body mass index (BMI) of 35.0 to 35.9 in adult  -     Ambulatory Referral to Weight Management Program    6. Encounter for screening mammogram for malignant neoplasm of breast  -     Mammo Screening Digital Tomosynthesis Bilateral With CAD; Future       Patient Instructions   Follow up with gastroenterology as previously  recommended.  Follow up with pain management, neurosurgery per their recommendations.  Referral sent for wt management program.  Have mammogram as ordered.  Discussed recent test results, answered questions to patient's satisfaction.   Advised her to have EKG repeated as ordered by .  Patient declines statin therapy.    Follow Up   Return in about 3 months (around 8/3/2024) for Annual physical.    Patient was given instructions and counseling regarding her condition or for health maintenance advice. Please see specific information pulled into the AVS if appropriate.     Radha Marsh, APRN     05/03/24

## 2024-05-03 NOTE — PATIENT INSTRUCTIONS
Follow up with gastroenterology as previously recommended.  Follow up with pain management, neurosurgery per their recommendations.  Referral sent for wt management program.  Have mammogram as ordered.

## 2024-05-06 ENCOUNTER — TELEPHONE (OUTPATIENT)
Dept: BARIATRICS/WEIGHT MGMT | Facility: CLINIC | Age: 59
End: 2024-05-06
Payer: COMMERCIAL

## 2024-05-17 ENCOUNTER — TELEPHONE (OUTPATIENT)
Dept: NEUROSURGERY | Facility: CLINIC | Age: 59
End: 2024-05-17
Payer: COMMERCIAL

## 2024-05-17 NOTE — TELEPHONE ENCOUNTER
Caller: Rita Prado    Relationship: Self    Best call back number: [phone]    What form or medical record are you requesting: FMLA    Who is requesting this form or medical record from you: EMPLOYER/VIRI    How would you like to receive the form or medical records (pick-up, mail, fax): FAX  If fax, what is the fax number: [phone] (ON PAPERWORK)    Timeframe paperwork needed: ASAP    Additional notes: DUE TO A CONTRACT CHANGE WITH EMPLOYER'S [employer], A MIX-UP OCCURRED IN WHICH PAPERWORK WAS NOT PROVIDED UNTIL THIS WEEK. PLEASE COMPLETE PAPERWORK AS IMMEDIATELY AS POSSIBLE, AND CALL PATIENT TO UPDATE.

## 2024-05-20 ENCOUNTER — LAB (OUTPATIENT)
Dept: LAB | Facility: HOSPITAL | Age: 59
End: 2024-05-20
Payer: COMMERCIAL

## 2024-05-20 ENCOUNTER — HOSPITAL ENCOUNTER (OUTPATIENT)
Dept: CARDIOLOGY | Facility: HOSPITAL | Age: 59
Discharge: HOME | End: 2024-05-20
Payer: COMMERCIAL

## 2024-05-20 DIAGNOSIS — M54.16 LUMBAR RADICULOPATHY: ICD-10-CM

## 2024-05-20 LAB
ABO GROUP BLD: NORMAL
ANION GAP SERPL CALCULATED.3IONS-SCNC: 8 MMOL/L (ref 5–15)
BASOPHILS # BLD AUTO: 0.04 10*3/MM3 (ref 0–0.2)
BASOPHILS NFR BLD AUTO: 0.7 % (ref 0–1.5)
BILIRUB UR QL STRIP: NEGATIVE
BLD GP AB SCN SERPL QL: NEGATIVE
BUN SERPL-MCNC: 11 MG/DL (ref 8–23)
BUN/CREAT SERPL: 14.3 (ref 7–25)
CALCIUM SPEC-SCNC: 9.5 MG/DL (ref 8.6–10.5)
CHLORIDE SERPL-SCNC: 103 MMOL/L (ref 98–107)
CLARITY UR: CLEAR
CO2 SERPL-SCNC: 28 MMOL/L (ref 22–29)
COLOR UR: YELLOW
CREAT SERPL-MCNC: 0.77 MG/DL (ref 0.57–1)
DEPRECATED RDW RBC AUTO: 39.2 FL (ref 37–54)
EGFRCR SERPLBLD CKD-EPI 2021: 87.9 ML/MIN/1.73
EOSINOPHIL # BLD AUTO: 0.18 10*3/MM3 (ref 0–0.4)
EOSINOPHIL NFR BLD AUTO: 3.3 % (ref 0.3–6.2)
ERYTHROCYTE [DISTWIDTH] IN BLOOD BY AUTOMATED COUNT: 12.2 % (ref 12.3–15.4)
GLUCOSE SERPL-MCNC: 94 MG/DL (ref 65–99)
GLUCOSE UR STRIP-MCNC: NEGATIVE MG/DL
HBA1C MFR BLD: 6.1 % (ref 4.8–5.6)
HCT VFR BLD AUTO: 39.6 % (ref 34–46.6)
HGB BLD-MCNC: 13.9 G/DL (ref 12–15.9)
HGB UR QL STRIP.AUTO: NEGATIVE
IMM GRANULOCYTES # BLD AUTO: 0.01 10*3/MM3 (ref 0–0.05)
IMM GRANULOCYTES NFR BLD AUTO: 0.2 % (ref 0–0.5)
INR PPP: 0.95 (ref 0.93–1.1)
KETONES UR QL STRIP: NEGATIVE
LEUKOCYTE ESTERASE UR QL STRIP.AUTO: NEGATIVE
LYMPHOCYTES # BLD AUTO: 2.98 10*3/MM3 (ref 0.7–3.1)
LYMPHOCYTES NFR BLD AUTO: 54.3 % (ref 19.6–45.3)
MCH RBC QN AUTO: 31.3 PG (ref 26.6–33)
MCHC RBC AUTO-ENTMCNC: 35.1 G/DL (ref 31.5–35.7)
MCV RBC AUTO: 89.2 FL (ref 79–97)
MONOCYTES # BLD AUTO: 0.43 10*3/MM3 (ref 0.1–0.9)
MONOCYTES NFR BLD AUTO: 7.8 % (ref 5–12)
MRSA DNA SPEC QL NAA+PROBE: NORMAL
NEUTROPHILS NFR BLD AUTO: 1.85 10*3/MM3 (ref 1.7–7)
NEUTROPHILS NFR BLD AUTO: 33.7 % (ref 42.7–76)
NITRITE UR QL STRIP: NEGATIVE
NRBC BLD AUTO-RTO: 0 /100 WBC (ref 0–0.2)
PH UR STRIP.AUTO: 6 [PH] (ref 5–8)
PLATELET # BLD AUTO: 405 10*3/MM3 (ref 140–450)
PMV BLD AUTO: 9.5 FL (ref 6–12)
POTASSIUM SERPL-SCNC: 4 MMOL/L (ref 3.5–5.2)
PROT UR QL STRIP: NEGATIVE
PROTHROMBIN TIME: 10.4 SECONDS (ref 9.6–11.7)
QT INTERVAL: 385 MS
QTC INTERVAL: 403 MS
RBC # BLD AUTO: 4.44 10*6/MM3 (ref 3.77–5.28)
RH BLD: POSITIVE
SODIUM SERPL-SCNC: 139 MMOL/L (ref 136–145)
SP GR UR STRIP: 1.02 (ref 1–1.03)
T&S EXPIRATION DATE: NORMAL
UROBILINOGEN UR QL STRIP: NORMAL
WBC NRBC COR # BLD AUTO: 5.49 10*3/MM3 (ref 3.4–10.8)

## 2024-05-20 PROCEDURE — 85610 PROTHROMBIN TIME: CPT

## 2024-05-20 PROCEDURE — 86901 BLOOD TYPING SEROLOGIC RH(D): CPT | Performed by: NEUROLOGICAL SURGERY

## 2024-05-20 PROCEDURE — 81003 URINALYSIS AUTO W/O SCOPE: CPT

## 2024-05-20 PROCEDURE — 83036 HEMOGLOBIN GLYCOSYLATED A1C: CPT

## 2024-05-20 PROCEDURE — 80048 BASIC METABOLIC PNL TOTAL CA: CPT

## 2024-05-20 PROCEDURE — 86900 BLOOD TYPING SEROLOGIC ABO: CPT | Performed by: NEUROLOGICAL SURGERY

## 2024-05-20 PROCEDURE — 85025 COMPLETE CBC W/AUTO DIFF WBC: CPT

## 2024-05-20 PROCEDURE — 86900 BLOOD TYPING SEROLOGIC ABO: CPT

## 2024-05-20 PROCEDURE — 87641 MR-STAPH DNA AMP PROBE: CPT

## 2024-05-20 PROCEDURE — 36415 COLL VENOUS BLD VENIPUNCTURE: CPT

## 2024-05-20 PROCEDURE — 93005 ELECTROCARDIOGRAM TRACING: CPT | Performed by: NEUROLOGICAL SURGERY

## 2024-05-20 PROCEDURE — 86901 BLOOD TYPING SEROLOGIC RH(D): CPT

## 2024-05-20 PROCEDURE — 86850 RBC ANTIBODY SCREEN: CPT | Performed by: NEUROLOGICAL SURGERY

## 2024-05-21 NOTE — TELEPHONE ENCOUNTER
ASH IS COMPLETE ING FORMS, SPOKE WITH PATIENT AND LET HER KNOW THEY WILL BE COMPLETED BY THE END OF THE DAY AND FAXED APPROPRIATELY. SHE ALSO REQUESTED A COPY OF THE FORMS AND SHE WILL PICK THEM UP FROM THE OFFICE. SHE VERBALIZED UNDERSTANDING.

## 2024-05-28 ENCOUNTER — TELEPHONE (OUTPATIENT)
Dept: NEUROSURGERY | Facility: CLINIC | Age: 59
End: 2024-05-28
Payer: COMMERCIAL

## 2024-05-29 ENCOUNTER — ANESTHESIA EVENT (OUTPATIENT)
Dept: PERIOP | Facility: HOSPITAL | Age: 59
End: 2024-05-29
Payer: COMMERCIAL

## 2024-05-29 NOTE — TELEPHONE ENCOUNTER
Called the patient and let her know what  stated and she said she understood and has new papers that need filled out before June 5th.

## 2024-05-29 NOTE — ANESTHESIA PREPROCEDURE EVALUATION
Anesthesia Evaluation     Patient summary reviewed and Nursing notes reviewed   history of anesthetic complications:  PONV  NPO Solid Status: > 8 hours  NPO Liquid Status: > 8 hours           Airway   Mallampati: II  TM distance: >3 FB  Neck ROM: full  No difficulty expected  Dental - normal exam     Pulmonary - normal exam    breath sounds clear to auscultation  (+) ,sleep apnea  Cardiovascular - normal exam  Exercise tolerance: unable to assess    ECG reviewed  Rhythm: regular  Rate: normal    (+) hypertension, hyperlipidemia      Neuro/Psych  (+) numbness, psychiatric history Anxiety  GI/Hepatic/Renal/Endo    (+) obesity, GERD, diabetes mellitus    Musculoskeletal (-) negative ROS    Abdominal  - normal exam   Substance History - negative use     OB/GYN negative ob/gyn ROS         Other - negative ROS                         Anesthesia Plan    ASA 3     general and ERAS Protocol   total IV anesthesia  (On Wegovy. ? Last dose, )  intravenous induction     Anesthetic plan, risks, benefits, and alternatives have been provided, discussed and informed consent has been obtained with: patient.        CODE STATUS:

## 2024-05-30 ENCOUNTER — ANESTHESIA (OUTPATIENT)
Dept: PERIOP | Facility: HOSPITAL | Age: 59
End: 2024-05-30
Payer: COMMERCIAL

## 2024-05-30 ENCOUNTER — APPOINTMENT (OUTPATIENT)
Dept: GENERAL RADIOLOGY | Facility: HOSPITAL | Age: 59
End: 2024-05-30
Payer: COMMERCIAL

## 2024-05-30 ENCOUNTER — HOSPITAL ENCOUNTER (OUTPATIENT)
Facility: HOSPITAL | Age: 59
Setting detail: SURGERY ADMIT
Discharge: HOME | End: 2024-05-30
Attending: NEUROLOGICAL SURGERY | Admitting: NEUROLOGICAL SURGERY
Payer: COMMERCIAL

## 2024-05-30 VITALS
DIASTOLIC BLOOD PRESSURE: 88 MMHG | HEART RATE: 78 BPM | TEMPERATURE: 97.4 F | BODY MASS INDEX: 33.85 KG/M2 | WEIGHT: 210.6 LBS | HEIGHT: 66 IN | OXYGEN SATURATION: 96 % | RESPIRATION RATE: 14 BRPM | SYSTOLIC BLOOD PRESSURE: 132 MMHG

## 2024-05-30 DIAGNOSIS — M54.16 LUMBAR RADICULOPATHY: ICD-10-CM

## 2024-05-30 DIAGNOSIS — M54.16 LUMBAR RADICULITIS: Primary | ICD-10-CM

## 2024-05-30 PROCEDURE — 99024 POSTOP FOLLOW-UP VISIT: CPT

## 2024-05-30 PROCEDURE — 25010000002 MAGNESIUM SULFATE PER 500 MG OF MAGNESIUM

## 2024-05-30 PROCEDURE — 25010000002 HYDROMORPHONE 1 MG/ML SOLUTION

## 2024-05-30 PROCEDURE — 25010000002 CEFAZOLIN PER 500 MG: Performed by: NEUROLOGICAL SURGERY

## 2024-05-30 PROCEDURE — 25010000002 MIDAZOLAM PER 1 MG

## 2024-05-30 PROCEDURE — 25010000002 PROPOFOL 1000 MG/100ML EMULSION

## 2024-05-30 PROCEDURE — 25010000002 DEXAMETHASONE SODIUM PHOSPHATE 20 MG/5ML SOLUTION

## 2024-05-30 PROCEDURE — 63047 LAM FACETEC & FORAMOT LUMBAR: CPT

## 2024-05-30 PROCEDURE — 25010000002 SUGAMMADEX 200 MG/2ML SOLUTION

## 2024-05-30 PROCEDURE — 25010000002 SUCCINYLCHOLINE PER 20 MG

## 2024-05-30 PROCEDURE — 72100 X-RAY EXAM L-S SPINE 2/3 VWS: CPT

## 2024-05-30 PROCEDURE — 76000 FLUOROSCOPY <1 HR PHYS/QHP: CPT

## 2024-05-30 PROCEDURE — 25810000003 LACTATED RINGERS PER 1000 ML: Performed by: ANESTHESIOLOGY

## 2024-05-30 PROCEDURE — 25010000002 METHYLPREDNISOLONE PER 40 MG: Performed by: NEUROLOGICAL SURGERY

## 2024-05-30 PROCEDURE — 63047 LAM FACETEC & FORAMOT LUMBAR: CPT | Performed by: NEUROLOGICAL SURGERY

## 2024-05-30 PROCEDURE — 25010000002 FENTANYL CITRATE (PF) 50 MCG/ML SOLUTION

## 2024-05-30 PROCEDURE — 25010000002 ONDANSETRON PER 1 MG

## 2024-05-30 DEVICE — DEV CONTRL TISS STRATAFIX SPIRAL MNCRYL UD 3/0 PLS 30CM: Type: IMPLANTABLE DEVICE | Site: SPINE LUMBAR | Status: FUNCTIONAL

## 2024-05-30 DEVICE — FLOSEAL HEMOSTATIC MATRIX, 10ML
Type: IMPLANTABLE DEVICE | Site: SPINE LUMBAR | Status: FUNCTIONAL
Brand: FLOSEAL HEMOSTATIC MATRIX

## 2024-05-30 RX ORDER — DEXAMETHASONE SODIUM PHOSPHATE 4 MG/ML
INJECTION, SOLUTION INTRA-ARTICULAR; INTRALESIONAL; INTRAMUSCULAR; INTRAVENOUS; SOFT TISSUE AS NEEDED
Status: DISCONTINUED | OUTPATIENT
Start: 2024-05-30 | End: 2024-05-30 | Stop reason: SURG

## 2024-05-30 RX ORDER — FENTANYL CITRATE 50 UG/ML
50 INJECTION, SOLUTION INTRAMUSCULAR; INTRAVENOUS
Status: DISCONTINUED | OUTPATIENT
Start: 2024-05-30 | End: 2024-05-30 | Stop reason: HOSPADM

## 2024-05-30 RX ORDER — LIDOCAINE HYDROCHLORIDE 10 MG/ML
0.5 INJECTION, SOLUTION INFILTRATION; PERINEURAL ONCE AS NEEDED
Status: DISCONTINUED | OUTPATIENT
Start: 2024-05-30 | End: 2024-05-30 | Stop reason: HOSPADM

## 2024-05-30 RX ORDER — OXYCODONE HCL 10 MG/1
10 TABLET, FILM COATED, EXTENDED RELEASE ORAL ONCE
Status: COMPLETED | OUTPATIENT
Start: 2024-05-30 | End: 2024-05-30

## 2024-05-30 RX ORDER — MAGNESIUM SULFATE HEPTAHYDRATE 500 MG/ML
INJECTION, SOLUTION INTRAMUSCULAR; INTRAVENOUS AS NEEDED
Status: DISCONTINUED | OUTPATIENT
Start: 2024-05-30 | End: 2024-05-30 | Stop reason: SURG

## 2024-05-30 RX ORDER — SODIUM CHLORIDE 0.9 % (FLUSH) 0.9 %
10 SYRINGE (ML) INJECTION AS NEEDED
Status: DISCONTINUED | OUTPATIENT
Start: 2024-05-30 | End: 2024-05-30 | Stop reason: HOSPADM

## 2024-05-30 RX ORDER — ONDANSETRON 2 MG/ML
4 INJECTION INTRAMUSCULAR; INTRAVENOUS ONCE AS NEEDED
Status: DISCONTINUED | OUTPATIENT
Start: 2024-05-30 | End: 2024-05-30 | Stop reason: HOSPADM

## 2024-05-30 RX ORDER — PHENYLEPHRINE HCL IN 0.9% NACL 1 MG/10 ML
SYRINGE (ML) INTRAVENOUS AS NEEDED
Status: DISCONTINUED | OUTPATIENT
Start: 2024-05-30 | End: 2024-05-30 | Stop reason: SURG

## 2024-05-30 RX ORDER — ACETAMINOPHEN 650 MG/1
325 SUPPOSITORY RECTAL EVERY 4 HOURS PRN
Status: DISCONTINUED | OUTPATIENT
Start: 2024-05-30 | End: 2024-05-30 | Stop reason: HOSPADM

## 2024-05-30 RX ORDER — OXYCODONE HYDROCHLORIDE 5 MG/1
5 TABLET ORAL ONCE AS NEEDED
Status: DISCONTINUED | OUTPATIENT
Start: 2024-05-30 | End: 2024-05-30 | Stop reason: HOSPADM

## 2024-05-30 RX ORDER — ACETAMINOPHEN 500 MG
1000 TABLET ORAL ONCE
Status: COMPLETED | OUTPATIENT
Start: 2024-05-30 | End: 2024-05-30

## 2024-05-30 RX ORDER — KETAMINE HCL IN NACL, ISO-OSM 100MG/10ML
SYRINGE (ML) INJECTION AS NEEDED
Status: DISCONTINUED | OUTPATIENT
Start: 2024-05-30 | End: 2024-05-30 | Stop reason: SURG

## 2024-05-30 RX ORDER — NALOXONE HYDROCHLORIDE 4 MG/.1ML
SPRAY NASAL
Qty: 2 EACH | Refills: 0 | Status: SHIPPED | OUTPATIENT
Start: 2024-05-30

## 2024-05-30 RX ORDER — ONDANSETRON 2 MG/ML
INJECTION INTRAMUSCULAR; INTRAVENOUS AS NEEDED
Status: DISCONTINUED | OUTPATIENT
Start: 2024-05-30 | End: 2024-05-30 | Stop reason: SURG

## 2024-05-30 RX ORDER — DIPHENHYDRAMINE HYDROCHLORIDE 50 MG/ML
12.5 INJECTION INTRAMUSCULAR; INTRAVENOUS
Status: DISCONTINUED | OUTPATIENT
Start: 2024-05-30 | End: 2024-05-30 | Stop reason: HOSPADM

## 2024-05-30 RX ORDER — SUCCINYLCHOLINE CHLORIDE 20 MG/ML
INJECTION INTRAMUSCULAR; INTRAVENOUS AS NEEDED
Status: DISCONTINUED | OUTPATIENT
Start: 2024-05-30 | End: 2024-05-30 | Stop reason: SURG

## 2024-05-30 RX ORDER — LABETALOL HYDROCHLORIDE 5 MG/ML
5 INJECTION, SOLUTION INTRAVENOUS
Status: DISCONTINUED | OUTPATIENT
Start: 2024-05-30 | End: 2024-05-30 | Stop reason: HOSPADM

## 2024-05-30 RX ORDER — GABAPENTIN 300 MG/1
300 CAPSULE ORAL ONCE
Status: COMPLETED | OUTPATIENT
Start: 2024-05-30 | End: 2024-05-30

## 2024-05-30 RX ORDER — SCOLOPAMINE TRANSDERMAL SYSTEM 1 MG/1
1 PATCH, EXTENDED RELEASE TRANSDERMAL ONCE
Status: DISCONTINUED | OUTPATIENT
Start: 2024-05-30 | End: 2024-05-30 | Stop reason: HOSPADM

## 2024-05-30 RX ORDER — OXYCODONE HYDROCHLORIDE 5 MG/1
15 TABLET ORAL EVERY 4 HOURS PRN
Status: DISCONTINUED | OUTPATIENT
Start: 2024-05-30 | End: 2024-05-30 | Stop reason: HOSPADM

## 2024-05-30 RX ORDER — SODIUM CHLORIDE, SODIUM LACTATE, POTASSIUM CHLORIDE, CALCIUM CHLORIDE 600; 310; 30; 20 MG/100ML; MG/100ML; MG/100ML; MG/100ML
1000 INJECTION, SOLUTION INTRAVENOUS CONTINUOUS
Status: DISCONTINUED | OUTPATIENT
Start: 2024-05-30 | End: 2024-05-30 | Stop reason: HOSPADM

## 2024-05-30 RX ORDER — CELECOXIB 200 MG/1
200 CAPSULE ORAL ONCE
Status: COMPLETED | OUTPATIENT
Start: 2024-05-30 | End: 2024-05-30

## 2024-05-30 RX ORDER — FLUMAZENIL 0.1 MG/ML
0.1 INJECTION INTRAVENOUS AS NEEDED
Status: DISCONTINUED | OUTPATIENT
Start: 2024-05-30 | End: 2024-05-30 | Stop reason: HOSPADM

## 2024-05-30 RX ORDER — METHYLPREDNISOLONE ACETATE 40 MG/ML
INJECTION, SUSPENSION INTRA-ARTICULAR; INTRALESIONAL; INTRAMUSCULAR; SOFT TISSUE AS NEEDED
Status: DISCONTINUED | OUTPATIENT
Start: 2024-05-30 | End: 2024-05-30 | Stop reason: HOSPADM

## 2024-05-30 RX ORDER — CYCLOBENZAPRINE HCL 10 MG
10 TABLET ORAL 3 TIMES DAILY PRN
Qty: 15 TABLET | Refills: 0 | Status: SHIPPED | OUTPATIENT
Start: 2024-05-30

## 2024-05-30 RX ORDER — HYDROCODONE BITARTRATE AND ACETAMINOPHEN 5; 325 MG/1; MG/1
1 TABLET ORAL EVERY 6 HOURS PRN
Qty: 12 TABLET | Refills: 0 | Status: SHIPPED | OUTPATIENT
Start: 2024-05-30

## 2024-05-30 RX ORDER — HYDRALAZINE HYDROCHLORIDE 20 MG/ML
5 INJECTION INTRAMUSCULAR; INTRAVENOUS
Status: DISCONTINUED | OUTPATIENT
Start: 2024-05-30 | End: 2024-05-30 | Stop reason: HOSPADM

## 2024-05-30 RX ORDER — ALBUTEROL SULFATE 2.5 MG/3ML
2.5 SOLUTION RESPIRATORY (INHALATION) ONCE AS NEEDED
Status: DISCONTINUED | OUTPATIENT
Start: 2024-05-30 | End: 2024-05-30 | Stop reason: HOSPADM

## 2024-05-30 RX ORDER — PROCHLORPERAZINE EDISYLATE 5 MG/ML
10 INJECTION INTRAMUSCULAR; INTRAVENOUS ONCE AS NEEDED
Status: DISCONTINUED | OUTPATIENT
Start: 2024-05-30 | End: 2024-05-30 | Stop reason: HOSPADM

## 2024-05-30 RX ORDER — ROCURONIUM BROMIDE 10 MG/ML
INJECTION, SOLUTION INTRAVENOUS AS NEEDED
Status: DISCONTINUED | OUTPATIENT
Start: 2024-05-30 | End: 2024-05-30 | Stop reason: SURG

## 2024-05-30 RX ORDER — MIDAZOLAM HYDROCHLORIDE 1 MG/ML
INJECTION INTRAMUSCULAR; INTRAVENOUS AS NEEDED
Status: DISCONTINUED | OUTPATIENT
Start: 2024-05-30 | End: 2024-05-30 | Stop reason: SURG

## 2024-05-30 RX ORDER — NALOXONE HCL 0.4 MG/ML
0.4 VIAL (ML) INJECTION AS NEEDED
Status: DISCONTINUED | OUTPATIENT
Start: 2024-05-30 | End: 2024-05-30 | Stop reason: HOSPADM

## 2024-05-30 RX ORDER — LIDOCAINE HYDROCHLORIDE 20 MG/ML
INJECTION, SOLUTION EPIDURAL; INFILTRATION; INTRACAUDAL; PERINEURAL AS NEEDED
Status: DISCONTINUED | OUTPATIENT
Start: 2024-05-30 | End: 2024-05-30 | Stop reason: SURG

## 2024-05-30 RX ORDER — LIDOCAINE HYDROCHLORIDE AND EPINEPHRINE 10; 10 MG/ML; UG/ML
INJECTION, SOLUTION INFILTRATION; PERINEURAL AS NEEDED
Status: DISCONTINUED | OUTPATIENT
Start: 2024-05-30 | End: 2024-05-30 | Stop reason: HOSPADM

## 2024-05-30 RX ORDER — EPHEDRINE SULFATE 5 MG/ML
5 INJECTION INTRAVENOUS ONCE AS NEEDED
Status: DISCONTINUED | OUTPATIENT
Start: 2024-05-30 | End: 2024-05-30 | Stop reason: HOSPADM

## 2024-05-30 RX ORDER — PROPOFOL 10 MG/ML
INJECTION, EMULSION INTRAVENOUS CONTINUOUS PRN
Status: DISCONTINUED | OUTPATIENT
Start: 2024-05-30 | End: 2024-05-30 | Stop reason: SURG

## 2024-05-30 RX ORDER — ACETAMINOPHEN 325 MG/1
650 TABLET ORAL ONCE AS NEEDED
Status: DISCONTINUED | OUTPATIENT
Start: 2024-05-30 | End: 2024-05-30 | Stop reason: HOSPADM

## 2024-05-30 RX ADMIN — SUGAMMADEX 200 MG: 100 INJECTION, SOLUTION INTRAVENOUS at 07:53

## 2024-05-30 RX ADMIN — SCOPALAMINE 1 PATCH: 1 PATCH, EXTENDED RELEASE TRANSDERMAL at 06:49

## 2024-05-30 RX ADMIN — FENTANYL CITRATE 50 MCG: 50 INJECTION, SOLUTION INTRAMUSCULAR; INTRAVENOUS at 09:22

## 2024-05-30 RX ADMIN — HYDROMORPHONE HYDROCHLORIDE 0.25 MG: 1 INJECTION, SOLUTION INTRAMUSCULAR; INTRAVENOUS; SUBCUTANEOUS at 08:35

## 2024-05-30 RX ADMIN — HYDROMORPHONE HYDROCHLORIDE 0.25 MG: 1 INJECTION, SOLUTION INTRAMUSCULAR; INTRAVENOUS; SUBCUTANEOUS at 09:05

## 2024-05-30 RX ADMIN — PROPOFOL INJECTABLE EMULSION 200 MG: 10 INJECTION, EMULSION INTRAVENOUS at 07:38

## 2024-05-30 RX ADMIN — DEXAMETHASONE SODIUM PHOSPHATE 4 MG: 4 INJECTION, SOLUTION INTRAMUSCULAR; INTRAVENOUS at 07:55

## 2024-05-30 RX ADMIN — HYDROMORPHONE HYDROCHLORIDE 0.25 MG: 1 INJECTION, SOLUTION INTRAMUSCULAR; INTRAVENOUS; SUBCUTANEOUS at 08:48

## 2024-05-30 RX ADMIN — Medication 25 MG: at 07:55

## 2024-05-30 RX ADMIN — ONDANSETRON 4 MG: 2 INJECTION INTRAMUSCULAR; INTRAVENOUS at 08:33

## 2024-05-30 RX ADMIN — LIDOCAINE HYDROCHLORIDE 100 MG: 20 INJECTION, SOLUTION EPIDURAL; INFILTRATION; INTRACAUDAL; PERINEURAL at 07:38

## 2024-05-30 RX ADMIN — OXYCODONE HYDROCHLORIDE 10 MG: 10 TABLET, FILM COATED, EXTENDED RELEASE ORAL at 06:27

## 2024-05-30 RX ADMIN — PROPOFOL INJECTABLE EMULSION 150 MCG/KG/MIN: 10 INJECTION, EMULSION INTRAVENOUS at 07:43

## 2024-05-30 RX ADMIN — HYDROMORPHONE HYDROCHLORIDE 0.25 MG: 1 INJECTION, SOLUTION INTRAMUSCULAR; INTRAVENOUS; SUBCUTANEOUS at 08:17

## 2024-05-30 RX ADMIN — ACETAMINOPHEN 1000 MG: 500 TABLET, FILM COATED ORAL at 06:27

## 2024-05-30 RX ADMIN — CEFAZOLIN 2 G: 2 INJECTION, POWDER, FOR SOLUTION INTRAMUSCULAR; INTRAVENOUS at 07:23

## 2024-05-30 RX ADMIN — ROCURONIUM BROMIDE 10 MG: 10 INJECTION, SOLUTION INTRAVENOUS at 07:38

## 2024-05-30 RX ADMIN — SUCCINYLCHOLINE CHLORIDE 100 MG: 20 INJECTION, SOLUTION INTRAMUSCULAR; INTRAVENOUS at 07:38

## 2024-05-30 RX ADMIN — MIDAZOLAM HYDROCHLORIDE 2 MG: 2 INJECTION, SOLUTION INTRAMUSCULAR; INTRAVENOUS at 07:30

## 2024-05-30 RX ADMIN — CELECOXIB 200 MG: 200 CAPSULE ORAL at 06:27

## 2024-05-30 RX ADMIN — SODIUM CHLORIDE, POTASSIUM CHLORIDE, SODIUM LACTATE AND CALCIUM CHLORIDE 1000 ML: 600; 310; 30; 20 INJECTION, SOLUTION INTRAVENOUS at 06:26

## 2024-05-30 RX ADMIN — MAGNESIUM SULFATE HEPTAHYDRATE 2 G: 500 INJECTION, SOLUTION INTRAMUSCULAR; INTRAVENOUS at 08:11

## 2024-05-30 RX ADMIN — GABAPENTIN 300 MG: 300 CAPSULE ORAL at 06:27

## 2024-05-30 RX ADMIN — OXYCODONE HYDROCHLORIDE 15 MG: 5 TABLET ORAL at 09:45

## 2024-05-30 RX ADMIN — FENTANYL CITRATE 25 MCG: 50 INJECTION, SOLUTION INTRAMUSCULAR; INTRAVENOUS at 09:50

## 2024-05-30 RX ADMIN — Medication 100 MCG: at 08:11

## 2024-05-30 NOTE — ANESTHESIA PROCEDURE NOTES
Airway  Urgency: elective    Date/Time: 5/30/2024 7:41 AM  Airway not difficult    General Information and Staff    Patient location during procedure: OR  CRNA/CAA: Melody Patel CRNA    Indications and Patient Condition  Indications for airway management: airway protection    Preoxygenated: yes  MILS maintained throughout  Mask difficulty assessment: 0 - not attempted    Final Airway Details  Final airway type: endotracheal airway      Successful airway: ETT  Cuffed: yes   Successful intubation technique: direct laryngoscopy  Facilitating devices/methods: intubating stylet  Endotracheal tube insertion site: oral  Blade: Derek  Blade size: 3  ETT size (mm): 7.0  Cormack-Lehane Classification: grade IIb - view of arytenoids or posterior of glottis only  Placement verified by: chest auscultation and capnometry   Measured from: lips  ETT/EBT  to lips (cm): 22  Number of attempts at approach: 1  Assessment: lips, teeth, and gum same as pre-op and atraumatic intubation    Additional Comments  Intubated by MELISA Sebastian

## 2024-05-30 NOTE — H&P
History of Present Illness: Riat Prado is a 59 y.o. female with pain that radiates from her buttock down her posterior and lateral thigh and into her foot.  No changes since she was last seen.         Chief Complaint   Patient presents with    Back Pain            Previous treatment: norco,gabapentin,cream     Previous neurosurgery:       Previous injections: SI joint injection, TFESI, caudal injection     The following portions of the patient's history were reviewed and updated as appropriate: allergies, current medications, past family history, past medical history, past social history, past surgical history, and problem list.     Review of Systems   Constitutional:  Positive for activity change.   HENT: Negative.     Eyes: Negative.    Cardiovascular:  Positive for leg swelling (at night).   Gastrointestinal: Negative.    Endocrine: Negative.    Genitourinary: Negative.    Musculoskeletal:  Positive for arthralgias, back pain (in her tail bone and goes to gorin) and myalgias.   Skin: Negative.    Allergic/Immunologic: Negative.    Neurological:  Positive for numbness (+tingling is constant).        Sharp and ache that goes from back to legs constant   She been holding on her butt more to yanet help take pressure off    Hematological: Negative.    Psychiatric/Behavioral:  Positive for sleep disturbance (hard to roll over).                Objective    Neurologic Exam  Awake alert  Full strength bilateral lower extremities  Decreased sensation over L5 and S1 dermatomes on the right     Assessment & Plan    Medical Decision Making:       Rita Prado is a 59 y.o. female we have been working up for some time now for SI joint problems versus radiculopathy.  She got no relief from her SI joint injection.  Her symptoms are most consistent with L5 and S1 radiculopathy on the right she does have severe foraminal and lateral recess stenosis at L5-S1 on the right.  Given this and failure of all conservative measures we  will proceed with right L5-S1 foraminotomy.  Patient understands the risks of surgery as well as increased risk due to medical comorbidities including morbid obesity GERD and hypertension among other medical comorbidities and she is agreed to undergo the procedure

## 2024-05-30 NOTE — OP NOTE
LUMBAR DECOMPRESSION  Procedure Report    Patient Name:  Rita Prado  YOB: 1965    Date of Surgery:  5/30/2024     Indications: 59-year-old female with history and symptoms consistent with right L5 and S1 radiculopathy with lateral recess and foraminal stenosis at L5-S1 causing nerve compression on the right.  Patient failed all conservative measures.  Given this patient was taken to the OR for right L5-S1 foraminotomy and decompression.  Patient understood the risks of surgery including bleeding infection CSF leak nerve damage spinal cord injury restenosis need for future operation including fusion surgery and she agreed to undergo the procedure    Pre-op Diagnosis:   Lumbar radiculopathy [M54.16]       Post-Op Diagnosis Codes:     * Lumbar radiculopathy [M54.16]    Procedure/CPT® Codes:      Procedure(s):  Right lumbar L5-S1 hemilaminectomy, medial facetectomy and foraminotomies    Staff:  Surgeon(s):  Chris Presley IV, MD    Assistant: Mata Pascual PA    Anesthesia: General    Estimated Blood Loss:  20cc    Implants:    Implant Name Type Inv. Item Serial No.  Lot No. LRB No. Used Action   DEV CONTRL TISS STRATAFIX SPIRAL MNCRYL UD 3/0 PLS 30CM - [id number] Implant DEV CONTRL TISS STRATAFIX SPIRAL MNCRYL UD 3/0 PLS 30CM  [employer]  DIV OF [employer] [id number] Right 1 Implanted   KT SEAL HEMOS ABS FLOSEAL MATRX FAST/PREP 10ML - [id number] Implant KT SEAL HEMOS ABS FLOSEAL MATRX FAST/PREP 10ML  Replaced by [facility] [id number] Right 1 Implanted       Specimen:          None        Findings: Stenosis    Complications: None    Description of Procedure: Patient was brought to the OR and placed under general endotracheal anesthesia.  She was flipped into the prone position on a Bubba table and prepped and draped in the usual fashion.  Incision was planned along the medial pedicular border on the right at L5-S1 using AP and lateral fluoroscopy.  Incision was made and carried down to the  fascia.  Tubular retractor was placed over the L4-5 disc space on the right under AP and lateral fluoroscopy.  Microscope was brought in the field and remaining tissue was removed from over the lamina and medial facet.  High-speed drill was used to remove the ipsilateral lamina and medial facet.  Ligamentum flavum was opened and removed with 3 and 4 Kerrison.  Care was taken to completely decompress the traversing S1 nerve root in the lateral recess with 3 Kerrison.  Foraminotomy was then performed with 3 Kerrison as well as 2 and 3 mm foraminal Kerrisons.  Once the nerves were completely decompressed hemostasis was achieved with bipolar cautery and Gelfoam powder and the area was thoroughly irrigated.  Steroid was placed over the thecal sac.  Tubular retractor was removed.  Further hemostasis was achieved with bipolar cautery and Gelfoam powder.  Fascia was closed with 0 Vicryl sutures, the deep dermal layer with 2-0 Vicryl sutures and the skin was closed with a running subcuticular Monocryl.  Dermabond was placed over the wound.  There were no changes in neuromonitoring throughout the case                Assistant: Mata Pascual PA  was responsible for performing the following activities: Retraction, Suction, Irrigation, Suturing, Closing, and Placing Dressing and their skilled assistance was necessary for the success of this case.    Chris Presley IV, MD     Date: 5/30/2024  Time: 08:43 EDT

## 2024-05-30 NOTE — DISCHARGE SUMMARY
Discharge Summary    Patient: Rita Prado  : 1965    Patient Care Team:  Radha Marsh APRN as PCP - General (Nurse Practitioner)    Date of Admit: 2024    Date of Discharge:  2024    Discharge Diagnosis:  Lumbar radiculopathy      Procedures Performed  Procedure(s):  Right lumbar L5-S1 foraminotomy       Complications: None     Consultants:   Consults       No orders found from 2024 to 2024.            Condition on Discharge: stable    Discharge disposition: home    HPI: Rita Prado is a 59 y.o. female who presented with symptoms consistent with L5 and S1 radiculopathy with lateral recess and foraminal stenosis at L5-S1 nerve compression on the right. Patient failed all conservative measures. Patient was made aware potential risks with surgery and was agreeable.     Hospital Course: Patient admitted for above procedure. The patient was transferred to home following recovery.  Patient did well during surgery.  Patient was sent to PACU following surgery and stayed until she met that required criteria to be discharged.  Patient has follow-up appointment on .  Patient is to call with any questions or concerns.    Vitals:    24 0642   BP: 136/98   Pulse: 84   Resp: 20   Temp: 98.2 °F (36.8 °C)   SpO2: 96%         Lab Results (last 24 hours)       ** No results found for the last 24 hours. **            XR Spine Lumbar 2 or 3 View    Result Date: 2024  This procedure was auto-finalized with no dictation required.             Discharge Physical Exam:    General  - WD/WN female, appears their stated age, awake, cooperative, in no acute distress  HEENT  - Normocephalic, atraumatic, PERRLA, EOM intact  Respiratory  - Normal respiratory rate and effort  Abdomen  - Flat, soft, NT/ND  Musculoskeletal  - Moves all extremities well, no joint swelling/tenderness  Skin  - Surgical incision well approximated, clean and dry, no swelling, redness, or drainage  NEUROLOGIC  - A/O x3  - CN  II-XII grossly intact  - Moves all extremities symmetrically and with good strength  - Sensation intact throughout      Discharge Medications  Inspect has been reviewed and narcotic consent is on file in the patient's chart.     Your medication list        START taking these medications        Instructions Last Dose Given Next Dose Due   cyclobenzaprine 10 MG tablet  Commonly known as: FLEXERIL      Take 1 tablet by mouth 3 (Three) Times a Day As Needed for Muscle Spasms.       HYDROcodone-acetaminophen 5-325 MG per tablet  Commonly known as: NORCO  Replaces: HYDROcodone-acetaminophen  MG per tablet      Take 1 tablet by mouth Every 6 (Six) Hours As Needed for Severe Pain.       naloxone 4 MG/0.1ML nasal spray  Commonly known as: NARCAN      Call 911. Don't prime. [place] in 1 nostril for overdose. Repeat in 2-3 minutes in other nostril if no or minimal breathing/responsiveness.              CONTINUE taking these medications        Instructions Last Dose Given Next Dose Due   famotidine 40 MG tablet  Commonly known as: PEPCID      Take 1 tablet by mouth Daily.       gabapentin 100 MG capsule  Commonly known as: NEURONTIN      Take 3 capsules by mouth 3 (Three) Times a Day As Needed (pain).       metoprolol-hydrochlorothiazide 50-25 MG per tablet  Commonly known as: LOPRESSOR HCT      Take 1 tablet by mouth Daily.       sertraline 50 MG tablet  Commonly known as: ZOLOFT           valACYclovir 1000 MG tablet  Commonly known as: VALTREX      valacyclovir 1 gram tablet   TAKE ONE (1) TABLET BY MOUTH DAILY              STOP taking these medications      baclofen 10 MG tablet  Commonly known as: LIORESAL        cholecalciferol 1.25 MG (08976 UT) capsule  Commonly known as: VITAMIN D3        HYDROcodone-acetaminophen  MG per tablet  Commonly known as: NORCO  Replaced by: HYDROcodone-acetaminophen 5-325 MG per tablet        Ibuprofen 3 %, Baclofen 2 %, lidocaine 4 %, Ketamine HCl 4 %        ibuprofen 800 MG  tablet  Commonly known as: ADVIL,MOTRIN        Phendimetrazine Tartrate 35 MG tablet        Wegovy 0.25 MG/0.5ML solution auto-injector  Generic drug: Semaglutide-Weight Management                  Where to Get Your Medications        These medications were sent to [id number] - [place], [place] - [address] AT [facility] [facility] [address] - [phone]  - [phone]   [address] [place] [address]      Phone: [phone]   cyclobenzaprine 10 MG tablet  HYDROcodone-acetaminophen 5-325 MG per tablet  naloxone 4 MG/0.1ML nasal spray         Discharge Diet: Regular, advance as tolerated      Activity at Discharge: No bending or twisting at the waist.  No rapid bending or twisting of the neck.  No lifting greater than 5 pounds.  No driving for 1 week.  Do not soak or submerge incision underwater for 6 weeks.      Call for: questions or concerns    Follow-up Appointments  Future Appointments   Date Time Provider Department Center   6/14/2024  8:20 AM Petra Michael APRN [facility] L[facility]   6/25/2024  8:30 AM Jacki Snyder MD [facility]   8/27/2024  9:00 AM Radha Marsh APRN [facility]      Follow-up Information       Radha Marsh APRN .    Specialties: Nurse Practitioner, Family Medicine  Contact information:  [address]  [place] [address] [phone]                               I discussed the discharge instructions with patient    Marissa Zambrano PA-C  05/30/24  09:01 EDT            Part of this note may be an electronic transcription/translation of spoken language to printed text using the Dragon Dictation System.

## 2024-05-30 NOTE — DISCHARGE INSTRUCTIONS
Lumbar Microdiscectomy and Lumbar Decompression    Post-Operative Guide    Dr. Chris Presley MD                                            Post-op  ACTIVITY GUIDE     DAY OF SURGERY   “We want you to get up and Move!    Getting out of bed soon after surgery will speed your recovery!”    GOAL:  Out of bed 2 hours after awaking from surgery for your first walk  You may require assistance from nurse  A walker for stability may be used initially but briefly  Short frequent walks (5min) every 2 hours while in hospital  Engage core when getting in and out of bed   Use smart movement strategies when changing positions  Practice DEEP BREATHING while you walk  3-6 count inhale and exhale  Rely on ORAL pain medications NOT IV       ADVANTAGES:  Prevent blood clots in the legs  Prevents pneumonia through promoting deep breathing  Prevents back muscles from stiffening - will decrease pain   You CANNOT walk too much  Oral pain meds have better steady control of pain     POST-OP DAY #1   Diet / Activity / Pain Control / GO HOME    Assessments by Physical Therapy and Occupational Therapy (OT)    GOAL  Review proper spine mechanics/ restrictions  Walking up and down stairs is GOOD   PT / OT will assess when you are safe to go home  Activities of Daily Living - okay to shower, dress, navigate around house  Surgical Incision needs to be covered during showers unless otherwise advised by Dr. Presley  No baths, hot tubs, swimming pools for 6 weeks or until incision closed without scab.   Go home !    Criteria for Discharge Home:  Cleared by PT / OT   Cleared by the Medicine Service  Adequate pain control with or without medications  Tolerating food and Liquids  Ability to urinate  Having a bowel movement IS NOT a discharge requirement unless there is a medical issue  Depends on pain control, support at home, mobility    Occasionally some patients with extra care needs continue their recovery at a local rehabilitation facility (e.g.  patients with minimal home social support, additional medical needs). Hospital based case coordinator will assist with rehab placement.     ACTIVITY GUIDELINES    Careful with the BLT's for 3 months !    Bending  Engage Core when changing positions   Use smart movement strategies - Squat, kneel, support yourself using arms  Bending with bad form once or twice is NOT a problem  Not using core to bend can “strain” back muscles    Lifting  General weight limit for first 6 weeks is a maximum of 20-40 lbs   Anything that causes “strain” should not be lifted  Coughing, sneezing, vomiting, straining with bowel movements can cause damage  Lap top, gallon of milk, purse, infant children okay  Luggage, large backpack, heavy grocery bag, furniture - NOT okay  Lift things close to body - limit reaching to pick things up    Twisting  Turn hips, shoulders and spine as one unit  Avoid reaching across the body  Activate core during more complex movements  Remember it is repetitive poor spine mechanics not solitary actions which can be harmful to your spine    Driving    - Okay to consider during first 2 weeks after surgery   - MUST meet following requirements    -You must be OFF narcotics and not under their influence     - You must be a SAFE  yourself.     - Patients may be considered to be UNSAFE if they are:     Distracted by their pain     Unable to sit comfortably for the required length of the journey               Unable to use mirrors safely because of restrictions or pain                ACTIVITY - FIRST 2 WEEKS:    Low Impact Aerobic Exercise  5-30min 2 times per day EVERY DAY  Walking, elliptical, stairs and/or recumbent bike outdoors,  Slow safe pace, okay to do intervals (walk 4 min rest 1 min x 3 -5 sets)  No hiking, speed walking or carrying.   FOCUS ON POSTURE, DEEP BREATHING (6 count) AND CORE ACTIVATION    Physical Therapy  Will start after your first post-operative office visit (2 weeks)  It's Important, So,  YES it's Mandatory  2 times per week x 12 weeks  We can recommend the Physical Therapist near your home  They should help guide your core exercise program  Home exercises and low impact aerobic work  should be done 4-5x per week in addition to PT   The static core program we recommend your physical therapist take you though is attached  Additional modalities are balance and light resistance band training     RETURNING TO WORK     The timescale for Return to Work will vary from patient to patient and depends mainly on the nature of your specific surgery and the type of work / activity you wish to recommence.     General Guidelines:    Can work from home if needed within the first week or so of surgery  Do not work for longer than 30-45 minutes at a time without a break (standing and walking around)    Sedentary jobs (desk work, etc)   Typically within 1-6 weeks  Depends on particulars of job, driving distance, stress, etc  Light duty  <20 lbs weight limit, Minimal BLT  If you have the option, sometimes returning to work alternate days (i.e Mon-Wed-Fri) for 1-2 weeks assists with the transition back to work.     Manual Labor  3-6 months - varies per case  depends on intensity and weight limit and specific surgery   must have exhibited a strong core with Level 3 or higher on core program or a note from PT reflecting a strong core                RECREATIONAL SPORTS & ACTIVITIES     After 2 weeks  swimming    After 6 weeks   hiking (no Pack, light grade)  Biking, jogging, resistance training, climbing, Pilates, sports specific drills, body weight interval training, golf, tennis  Core level 3 completed  Start slow and build up slowly   Do NOT go back FULL SPEED right away     After 3 months  Skiing, horseback riding, ATV riding, snow mobile etc      Sports - Non-Contact  Minimum 6 weeks  Must have core level 3 or greater  Must have completed aerobic and resistance training   Must have successfully done sports specific  drill and been asymptomatic    Sports - Contact   Varies from sport to sport; usual 3-6 months  Must exhibit strong Core Level 4 or 5   Must have completed non-contact  sports specific drills without any symptoms      Lifelong recommendations:  Warm up before EVERY activity 5-10 minutes using Recumbent bike, Stair Master, elliptical , speed walk  Core exercises:   3 -5 x /week - forever!  10 minutes  Should follow warm up prior to activity / sport  Always know your core level

## 2024-05-30 NOTE — ANESTHESIA POSTPROCEDURE EVALUATION
Patient: Rita Riversutt    Procedure Summary       Date: 05/30/24 Room / Location: [facility] OR 12 / [facility] MAIN OR    Anesthesia Start: 0727 Anesthesia Stop: 0906    Procedure: Right lumbar L5-S1 foraminotomy (Right: Spine Lumbar) Diagnosis:       Lumbar radiculopathy      (Lumbar radiculopathy [M54.16])    Surgeons: Chris Presley IV, MD Provider: Harvey Fuentes MD    Anesthesia Type: general, ERAS Protocol ASA Status: 3            Anesthesia Type: general, ERAS Protocol    Vitals  Vitals Value Taken Time   /86 05/30/24 1014   Temp 97.4 °F (36.3 °C) 05/30/24 1005   Pulse 69 05/30/24 1014   Resp 15 05/30/24 1005   SpO2 95 % 05/30/24 1014   Vitals shown include unfiled device data.        Post Anesthesia Care and Evaluation    Patient location during evaluation: PACU  Patient participation: complete - patient participated  Level of consciousness: awake  Pain scale: See nurse's notes for pain score.  Pain management: adequate    Airway patency: patent  Anesthetic complications: No anesthetic complications  PONV Status: none  Cardiovascular status: acceptable  Respiratory status: acceptable and spontaneous ventilation  Hydration status: acceptable    Comments: Patient seen and examined postoperatively; vital signs stable; SpO2 greater than or equal to 90%; cardiopulmonary status stable; nausea/vomiting adequately controlled; pain adequately controlled; no apparent anesthesia complications; patient discharged from anesthesia care when discharge criteria were met

## 2024-06-14 ENCOUNTER — OFFICE VISIT (OUTPATIENT)
Dept: ORTHOPEDIC SURGERY | Facility: CLINIC | Age: 59
End: 2024-06-14

## 2024-06-14 VITALS — BODY MASS INDEX: 34.52 KG/M2 | TEMPERATURE: 97.5 F | WEIGHT: 214.8 LBS | HEIGHT: 66 IN

## 2024-06-14 DIAGNOSIS — M54.16 LUMBAR RADICULOPATHY: Primary | ICD-10-CM

## 2024-06-14 PROCEDURE — 99024 POSTOP FOLLOW-UP VISIT: CPT | Performed by: NURSE PRACTITIONER

## 2024-06-14 NOTE — PROGRESS NOTES
Patient Name: Rita Prado   YOB: 1965  Referring Primary Care Physician: Radha Marsh APRN      Chief Complaint:    Chief Complaint   Patient presents with    Lumbar Spine - Follow-up, Pain       HPI:  Rita Prado is a 59 y.o. female who presents to [facility] for first postop visit following right L5-S1 foraminotomies with Dr. Presley on 05/30/2024.  She is doing well with improvement in the right lower extremity pain.  Still complaining of some heel and plantar fascia pain with swelling.  Apparently this has been a chronic issue as well.  She sees a podiatrist for these complaints.    PFSH:  See attached    ROS: As per HPI, otherwise negative    Objective:      59 y.o. female  Body mass index is 35.2 kg/m²., 97.4 kg (214 lb 12.8 oz), @HT@  Vitals:    06/14/24 0826   Temp: 97.5 °F (36.4 °C)     Pain Score    06/14/24 0826   PainSc:   6   PainLoc: Back            Spine Musculoskeletal Exam    Gait    Gait is normal.    Inspection    Thoracolumbar        Prior incision: midline lumbar    Incision: clean, dry and intact    Palpation    Thoracolumbar    Right      Muscle tone: normal    Left      Muscle tone: normal    Strength    Thoracolumbar    Thoracolumbar motor exam is normal.           IMAGING:     No imaging in office today  Assessment:           Diagnoses and all orders for this visit:    1. Lumbar radiculopathy (Primary)  -     Ambulatory Referral to Physical Therapy             Plan:  She is being seen for 2-week postoperative follow-up after undergoing lumbar decompression with Dr. Presley.  Patient doing very well postoperatively, resolution of most preop symptoms. The incision is healing nicely with no signs of wound breakdown or infection, original or dressing removed today replaced with a sterile dry dressing.  She denies fever but does have some chills at times, she says this has been going on since surgery.  She is currently on amoxicillin for abscessed  tooth so chills could be related to the abscess or residual anesthesia effect which should resolve soon.  At this point patient should begin outpatient PT for postoperative recovery.Patient should refrain from lifting any more than 30 pounds.  May shower like normal but should not submerge the incision underwater for another 4 weeks.  Follow-up 3 months postoperatively with Dr. Presley call in the meantime for questions or concerns.  Patient is agreeable to this plan.     Return in about 3 months (around 9/14/2024) for pasquale.    EMR Dragon/Transcription Disclaimer:   Much of this encounter note is an electronic transcription/translation of spoken language to printed text. The electronic translation of spoken language may permit erroneous, or at times, nonsensical words or phrases to be inadvertently transcribed; Although I have reviewed the note for such errors, some may still exist.  Red flags have been discussed at this or previous visits to include but not limited weakness in extremities, worsening pain that does not respond to conservative treatment and bowel or bladder dysfunction. These are reasons to present to ER and patient has been informed.    The diagnosis(es), natural history, pathophysiology and treatment for diagnosis(es) were discussed. Opportunity given and questions answered. Biomechanics of pertinent body areas discussed.    EXERCISES:  Advice on benefits of, and types of regular/moderate exercise pertaining to diagnosis.  Continue HEP. For back or neck pain, recommend pilates and or yoga as tolerated. Generally it is best to start any new exercise under the guidance of a  or therapist.   MEDICATIONS:  When prescribe, the risks, benefits, warnings,side effects and alternatives of medications discussed. Advised against long term use of narcotics.   PAIN CONTROL:  Cold, heat, OTC lidocaine patches and/or ointment as needed. Avoid direct skin contact with ice. Ice 15-20 minutes 3-4 times daily  as needed. For SI joint pain, recommend ice bath in water about 50 degrees for 5 consecutive days, add ice slowly to help with adjustment and may cover with warm towel or robe to help with cold tolerance. If using lidocaine, do not apply heat in conjunction as this can cause a burn.   MEDICAL RECORDS reviewed from other provider(s) for past and current medical history pertinent to this visit..

## 2024-06-24 ENCOUNTER — TELEPHONE (OUTPATIENT)
Dept: PAIN MEDICINE | Facility: CLINIC | Age: 59
End: 2024-06-24

## 2024-06-24 DIAGNOSIS — M54.16 LUMBAR RADICULOPATHY: ICD-10-CM

## 2024-06-24 DIAGNOSIS — G89.4 CHRONIC PAIN SYNDROME: Primary | ICD-10-CM

## 2024-06-24 RX ORDER — HYDROCODONE BITARTRATE AND ACETAMINOPHEN 10; 325 MG/1; MG/1
1 TABLET ORAL 3 TIMES DAILY PRN
Qty: 90 TABLET | Refills: 0 | Status: SHIPPED | OUTPATIENT
Start: 2024-06-24

## 2024-06-24 NOTE — TELEPHONE ENCOUNTER
PLEASE CALL / LEAVE VMAIL TO NOTIFY PATIENT IF / WHEN REFILL FOR     NORCO  MG (TAKING ONE TABLET 3 TIMES PER DAY) TO GO TO     [facility] # [id number] [phone])     PATIENT WILL RUN OUT BY 6/27 (NEEDS BY / BEFORE FRI 6/28 SINCE PHARMACY CLOSED ON THE WEEKENDS)     MOST RECENT FILL 05-24-24     PATIENT RESCHEDULED 2 MONTH FOLLOW UP / LUMBAR APPT FROM TOMORROW 06-25-24 w/DR GILLESPIE TO 07-15-24 SINCE PATIENT DOES NOT HAVE INSURANCE RIGHT NOW (BHARGAV BERGMAN BCBS WAS TERMINATED 06-01-23)     PATIENT ANTICIPATES [place] MEDICAID TO TAKE EFFECT BY 07-22-24, HOPEFULLY SOONER     GIVEN MED ASSIST, [facility] BILLING ESTIMATES, & BILLING DEPT #s)     ALSO FYI: PATIENT STATED DR GILLESPIE AWARE LUMBAR SURGERY DONE 05-30-24 BY NEUROSURGEON DR MADALYN TORRSE     THANKS

## 2024-06-24 NOTE — TELEPHONE ENCOUNTER
Patient is requesting Hydrocodone which is not currently on her list. Inspect is in chart. She is currently without insurance and expected to get Medicaid soon. Does she need to wait until she is seen on 7/15?

## 2024-06-26 DIAGNOSIS — I10 PRIMARY HYPERTENSION: ICD-10-CM

## 2024-06-26 DIAGNOSIS — K21.9 GASTROESOPHAGEAL REFLUX DISEASE, UNSPECIFIED WHETHER ESOPHAGITIS PRESENT: ICD-10-CM

## 2024-06-26 RX ORDER — AMOXICILLIN 500 MG/1
TABLET, FILM COATED ORAL
COMMUNITY
Start: 2024-05-28

## 2024-06-26 RX ORDER — METOPROLOL TARTRATE AND HYDROCHLOROTHIAZIDE 50; 25 MG/1; MG/1
1 TABLET ORAL DAILY
Qty: 90 TABLET | Refills: 0 | Status: SHIPPED | OUTPATIENT
Start: 2024-06-26

## 2024-06-26 RX ORDER — FAMOTIDINE 40 MG/1
40 TABLET, FILM COATED ORAL DAILY
Qty: 90 TABLET | Refills: 0 | Status: SHIPPED | OUTPATIENT
Start: 2024-06-26

## 2024-07-15 ENCOUNTER — OFFICE VISIT (OUTPATIENT)
Dept: PAIN MEDICINE | Facility: CLINIC | Age: 59
End: 2024-07-15
Payer: COMMERCIAL

## 2024-07-15 VITALS
BODY MASS INDEX: 35.07 KG/M2 | SYSTOLIC BLOOD PRESSURE: 124 MMHG | OXYGEN SATURATION: 94 % | RESPIRATION RATE: 16 BRPM | HEART RATE: 83 BPM | WEIGHT: 214 LBS | DIASTOLIC BLOOD PRESSURE: 97 MMHG

## 2024-07-15 DIAGNOSIS — M54.16 LUMBAR RADICULOPATHY: ICD-10-CM

## 2024-07-15 DIAGNOSIS — M47.817 LUMBOSACRAL SPONDYLOSIS WITHOUT MYELOPATHY: ICD-10-CM

## 2024-07-15 DIAGNOSIS — G89.4 CHRONIC PAIN SYNDROME: Primary | ICD-10-CM

## 2024-07-15 DIAGNOSIS — M96.1 POSTLAMINECTOMY SYNDROME OF CERVICAL REGION: ICD-10-CM

## 2024-07-15 DIAGNOSIS — Z79.899 HIGH RISK MEDICATION USE: ICD-10-CM

## 2024-07-15 PROCEDURE — 99214 OFFICE O/P EST MOD 30 MIN: CPT | Performed by: ANESTHESIOLOGY

## 2024-07-15 RX ORDER — BACLOFEN 10 MG/1
TABLET ORAL
COMMUNITY
Start: 2024-07-10

## 2024-07-15 RX ORDER — METHYLPREDNISOLONE 4 MG/1
TABLET ORAL
Qty: 21 TABLET | Refills: 0 | Status: SHIPPED | OUTPATIENT
Start: 2024-07-15

## 2024-07-16 ENCOUNTER — TELEPHONE (OUTPATIENT)
Dept: ORTHOPEDIC SURGERY | Facility: CLINIC | Age: 59
End: 2024-07-16
Payer: COMMERCIAL

## 2024-07-16 RX ORDER — HYDROCODONE BITARTRATE AND ACETAMINOPHEN 10; 325 MG/1; MG/1
1 TABLET ORAL 3 TIMES DAILY PRN
Qty: 90 TABLET | Refills: 0 | Status: SHIPPED | OUTPATIENT
Start: 2024-07-23

## 2024-07-16 RX ORDER — HYDROCODONE BITARTRATE AND ACETAMINOPHEN 10; 325 MG/1; MG/1
1 TABLET ORAL 3 TIMES DAILY PRN
Qty: 90 TABLET | Refills: 0 | Status: SHIPPED | OUTPATIENT
Start: 2024-08-22

## 2024-07-16 NOTE — PROGRESS NOTES
Subjective    CC back pain  Rita Prado is a 59 y.o. female with polyarthralgia S/P bilateral shoulder surgeries, chronic neck pain S/P ACDF Oct 2018/Dr. Tai, back pain, here for f/u.     S/P  right L5-S1 laminectomy with Dr. Presley, recovering well.  Continued hydrocodone with good relief and functional benefit.  Started physical therapy.    Chronic mid back/thoracic back pain associated with significant paraspinal muscle spasm and pain.  Chronic lower back pain radiating to bilateral lower extremity usually radiating to right hip, right leg worse with standing prolonged sitting or activity.  Denies weakness, bladder bowel continence.  Chronic neck pain from postlaminectomy syndrome with paraspinal muscle spasm radiating to bilateral shoulders.     Pain interfere with ADL/sleep and work.   Good relief of caudal UBALDO, transforaminal UBALDO, cervical UBALDO.    Utilizes hydrocodone with good relief of functional benefits and denies side effects.    L-spine MRI reviewed 2021 showing moderate degenerative changes progressed from previous.  Notably disc bulge with superimposed small right paracentral disc extrusion traversing 3 mm inferiorly narrowing the right lateral recess and abutting the descending right S1 nerve root. Mild bilateral facet arthropathy. Mild spinal canal stenosis. Moderate to severe right and severe left neural foraminal stenosis.     C-spine MRI multilevel degenerative changes, slight retrolisthesis C5-C6 asymmetry to the left.  Multiple level foraminal narrowing.  Osteophyte complex..     Pain Assessment   Location of Pain: Lower Back, R Hip, L Hip, L/R Leg, neck pain, joint  Description of Pain: Dull/Aching, Throbbing, Stabbing  Previous Pain Rating :7  Current Pain Ratin  Aggravating Factors: Activity  Alleviating Factors: Rest, Medication    PEG Assessment   What number best describes your pain on average in the past week?7  What number best describes how, during the past week, pain has  interfered with your enjoyment of life?7  What number best describes how, during the past week, pain has interfered with your general activity? 10     The following portions of the patient's history were reviewed and updated as appropriate: allergies, current medications, past family history, past medical history, past social history, past surgical history and problem list.     has a past medical history of Achilles tendinitis, Anxiety, Depression, Elevated hemoglobin A1c, GERD (gastroesophageal reflux disease), Groin discomfort, Hip pain, Hypertension, Leg pain, Low back pain, Obesity, PONV (postoperative nausea and vomiting), and Shoulder pain.   has a past surgical history that includes Rotator cuff repair; Shoulder surgery (Left); Neck surgery; and Lumbar Decompression (Right, 5/30/2024).  family history includes Alcohol abuse in her father; Breast cancer in her mother; Dementia in her father; Heart failure in her mother; Hypertension in her father and mother; Stroke in her father.        Review of Systems   Musculoskeletal:  Positive for back pain.        Left leg pain and weakness   All other systems reviewed and are negative.    Objective   Physical Exam  Vitals reviewed.   Constitutional:       General: She is not in acute distress.  Pulmonary:      Effort: Pulmonary effort is normal.   Musculoskeletal:      Cervical back: Tenderness present. Decreased range of motion.      Lumbar back: Tenderness present. Decreased range of motion. Positive right straight leg raise test and positive left straight leg raise test.      Comments: Lumbar loading positive, pain on extension of low back past 5 degrees.  TTP on the lumbar facets noted.  Jasmin bilaterally, positive Gaenslen bilaterally, positive SI compression test bilaterally       /97 (BP Location: Left arm, Patient Position: Sitting, Cuff Size: Adult)   Pulse 83   Resp 16   Wt 97.1 kg (214 lb)   SpO2 94%   BMI 35.07 kg/m²      PHQ 9 on chart  Opioid  risk tool low risk    Assessment & Plan   Diagnoses and all orders for this visit:    1. Chronic pain syndrome (Primary)  -     methylPREDNISolone (MEDROL) 4 MG dose pack; Take as directed on package instructions.  Dispense: 21 tablet; Refill: 0  -     HYDROcodone-acetaminophen (NORCO)  MG per tablet; Take 1 tablet by mouth 3 (Three) Times a Day As Needed for Severe Pain.  Dispense: 90 tablet; Refill: 0  -     HYDROcodone-acetaminophen (NORCO)  MG per tablet; Take 1 tablet by mouth 3 (Three) Times a Day As Needed for Severe Pain. DNF before 8/22/2024  Dispense: 90 tablet; Refill: 0    2. Lumbosacral spondylosis without myelopathy    3. Postlaminectomy syndrome of cervical region    4. High risk medication use    5. Lumbar radiculopathy  -     HYDROcodone-acetaminophen (NORCO)  MG per tablet; Take 1 tablet by mouth 3 (Three) Times a Day As Needed for Severe Pain. DNF before 8/22/2024  Dispense: 90 tablet; Refill: 0    Summary  Rita Prado is a 59 y.o. female with polyarthralgia S/P bilateral shoulder surgeries, chronic back pain, neck pain S/P cervical fusion Oct 2018/Dr. Tai here for f/u.   Chronic pain from lumbar DDD spondylosis with occasional right lower extremity radicular pain..  Chronic neck pain postlaminectomy syndrome.  Repeat caudal as needed. Had 70% relief with last caudal lasting over 3 months.    S/P  right L5-S1 laminectomy with Dr. Presley, recovering well.  Continued hydrocodone with good relief and functional benefit.  Started physical therapy.    Continue hydrocodone 10/325 2-3 times daily as needed for severe pain.  UDS and inspect reviewed.   Discussed risk of tolerance, dependence, respiratory depression, coma and death associated with use of oral opioids for treatment of chronic nonmalignant pain.     Continue baclofen, ibuprofen as needed.    RTC 2-3mo

## 2024-07-16 NOTE — TELEPHONE ENCOUNTER
Caller: MARY    [facility]    Best call back number: [phone]*    Patient is needing: MARY IS STATING THAT THEY HAVE SENT OVER A PLAN OF CARE OF THIS PT.. THEY ARE NEEDING IT SIGNED AND SENT BACK SO THEY CAN SEND IT OVER TO THE INSURANCE TO BE AUTHORIZED.. PLEASE ADVISE..    FAX NUMBER IS ([phone])

## 2024-08-26 DIAGNOSIS — M54.16 LUMBAR RADICULITIS: ICD-10-CM

## 2024-08-26 DIAGNOSIS — G89.4 CHRONIC PAIN SYNDROME: ICD-10-CM

## 2024-08-26 RX ORDER — CLINDAMYCIN HCL 300 MG
300 CAPSULE ORAL 4 TIMES DAILY
COMMUNITY
Start: 2024-08-26

## 2024-08-26 RX ORDER — IBUPROFEN 800 MG/1
800 TABLET, FILM COATED ORAL EVERY 6 HOURS PRN
COMMUNITY
Start: 2024-08-26

## 2024-08-26 RX ORDER — GABAPENTIN 100 MG/1
CAPSULE ORAL
Qty: 180 CAPSULE | Refills: 1 | Status: SHIPPED | OUTPATIENT
Start: 2024-08-26

## 2024-08-26 RX ORDER — AMOXICILLIN 500 MG/1
500 TABLET, FILM COATED ORAL 2 TIMES DAILY
COMMUNITY
Start: 2024-08-19 | End: 2024-08-27 | Stop reason: ALTCHOICE

## 2024-08-27 ENCOUNTER — OFFICE VISIT (OUTPATIENT)
Dept: FAMILY MEDICINE CLINIC | Facility: CLINIC | Age: 59
End: 2024-08-27
Payer: COMMERCIAL

## 2024-08-27 ENCOUNTER — PATIENT ROUNDING (BHMG ONLY) (OUTPATIENT)
Dept: FAMILY MEDICINE CLINIC | Facility: CLINIC | Age: 59
End: 2024-08-27
Payer: COMMERCIAL

## 2024-08-27 VITALS
HEART RATE: 76 BPM | WEIGHT: 213.4 LBS | HEIGHT: 66 IN | SYSTOLIC BLOOD PRESSURE: 132 MMHG | OXYGEN SATURATION: 93 % | BODY MASS INDEX: 34.3 KG/M2 | TEMPERATURE: 99.3 F | DIASTOLIC BLOOD PRESSURE: 82 MMHG | RESPIRATION RATE: 18 BRPM

## 2024-08-27 DIAGNOSIS — K21.9 GASTROESOPHAGEAL REFLUX DISEASE, UNSPECIFIED WHETHER ESOPHAGITIS PRESENT: ICD-10-CM

## 2024-08-27 DIAGNOSIS — Z12.12 SCREENING FOR COLORECTAL CANCER: ICD-10-CM

## 2024-08-27 DIAGNOSIS — Z12.11 SCREENING FOR COLORECTAL CANCER: ICD-10-CM

## 2024-08-27 DIAGNOSIS — E78.5 HYPERLIPIDEMIA, UNSPECIFIED HYPERLIPIDEMIA TYPE: ICD-10-CM

## 2024-08-27 DIAGNOSIS — E66.09 CLASS 1 OBESITY DUE TO EXCESS CALORIES WITH SERIOUS COMORBIDITY AND BODY MASS INDEX (BMI) OF 34.0 TO 34.9 IN ADULT: ICD-10-CM

## 2024-08-27 DIAGNOSIS — R13.10 DYSPHAGIA, UNSPECIFIED TYPE: ICD-10-CM

## 2024-08-27 DIAGNOSIS — I10 PRIMARY HYPERTENSION: ICD-10-CM

## 2024-08-27 DIAGNOSIS — M54.16 LUMBAR RADICULOPATHY: ICD-10-CM

## 2024-08-27 DIAGNOSIS — Z00.00 ENCOUNTER FOR ANNUAL PHYSICAL EXAM: Primary | ICD-10-CM

## 2024-08-27 DIAGNOSIS — R73.03 PREDIABETES: ICD-10-CM

## 2024-08-27 PROCEDURE — 99396 PREV VISIT EST AGE 40-64: CPT | Performed by: NURSE PRACTITIONER

## 2024-08-27 PROCEDURE — 99214 OFFICE O/P EST MOD 30 MIN: CPT | Performed by: NURSE PRACTITIONER

## 2024-08-27 RX ORDER — CHOLECALCIFEROL (VITAMIN D3) 25 MCG
1000 TABLET ORAL DAILY
COMMUNITY

## 2024-08-27 NOTE — PATIENT INSTRUCTIONS
Please call Dr. Saab's office and update your primary care so that they can send us your record of previous pap.  Call to schedule mammogram - order was placed previously.  Check insurance to see if you need to have tdap in office or at pharmacy.    Follow up with dentistry per their recommendations.  Continue current medications and treatment.   Call the office 2 weeks before follow up appointment and will place lab orders.  Have labs drawn 3-4 days prior to f/u appointment.   Go to er if drooling or unable to swallow.

## 2024-08-27 NOTE — PROGRESS NOTES
August 27, 2024    Hello, may I speak with Rita Prado?    My name is Shweta      I am  with [facility]  [address] [place] [address].    Before we get started may I verify your date of birth? 1965    I am calling to officially welcome you to our practice and ask about your recent visit. Is this a good time to talk? yes    Tell me about your visit with us. What things went well?  My visit was great!       We're always looking for ways to make our patients' experiences even better. Do you have recommendations on ways we may improve?  no    Overall were you satisfied with your visit to our practice? yes       I appreciate you taking the time to speak with me today. Is there anything else I can do for you? no      Thank you, and have a great day.

## 2024-08-27 NOTE — PROGRESS NOTES
Subjective        Rita Prado is a 59 y.o. female who presents to [facility].     Chief Complaint   Patient presents with    Annual Exam    Prediabetes       History of Present Illness    Patient presents for annual wellness exam and to discuss chronic medical conditions.    Patient is overdue for mammogram, was ordered 5/2024 but hasn't been performed yet. She performs monthly breast self exams.  She has not had colorectal cancer screening, was referred to GI 3/2024 for dysphagia and colorectal cancer screening but hasn't followed up.  She states she last pap in 2023 at Dr. Saba's office, states all paps have been negative in past.  She had all childhood immunizations. She has not had shingles vaccine. She declines further covid vaccines, had initial series.  She is unsure when she last had tetanus vaccine but states it has been <10 years. She lives with her daughter and her nephew, her son just left for college at [employer]. She has smoke detectors in her home, wears seatbelt in vehicles.  She is due to have yearly vision exam, wears reading glasses.  She brushes teeth 2-3x daily, flosses daily.  She is utd on dental cleanings. She is taking clindamycin 300mg 4x daily for a tooth infection, is followed by Dr. Amadeo Proctor, they are planning on root canal on 9/9/2024. She denies STI concerns.     Prediabetes - stable -labs 5/2024 with elevated hemoglobin A1c 6.1.  She does not take medication, does not check blood glucose levels at home.  Hyperlipidemia - stable - not on medication, most recent lipid panel 3/2024 with elevated , otherwise lipid panel within normal limits.  10-year ASCVD risk of 7.2%. She has declined statin therapy, wishes to try low cholesterol diet and pursue weight loss.   Dysphagia/gerd - stable - was referred to gastroenterology at visit 3/2024 for eval of dysphagia and for screeening colonoscopy. She has not been seen yet by  gastroenterology. No drooling, no pain with swallowing.  She is taking pepcid 40mg daily.  Lumbar radiculopathy/lumbar degenerative disc disease - followed by neurosurgeon dr. Presley, had right L5-S1 foraminotomy 5/2024.  Is followed by pain management Dr. Snyder, takes Norco 10 3 times daily as needed, gabapentin 300 mg 3 times daily as needed, baclofen 10 mg 3 times daily, ibuprofen 800 mg by mouth every 8 hours as needed for pain.  She is participating in physical therapy.  She also sees podiatry Dr. Cheema for right foot/ankle pain.  [id number]-FDX 34.97.  She is interested in losing weight because she feels that it will help improve back pain and chronic medical conditions, was referred to weight management 5/2024 but hasn't been able to make appointment because of so many recent surgeries and provider visits.  Hypertension - stable - taking metoprolol-hctz 50-25mg daily.  No chest pain, shortness of breath, palpitations. Rarely she c/o trace bilateral ankle edema. She is not followed by cardiology currently, saw Dr. Carrasco in remote past.     The following portions of the patient's history were reviewed and updated as appropriate: allergies, current medications, past family history, past medical history, past social history, past surgical history and problem list.    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      Progesterone Nausea And Vomiting          Current Outpatient Medications:     baclofen (LIORESAL) 10 MG tablet, Take 1 tablet by mouth 3 (Three) Times a Day., Disp: , Rfl:     Cholecalciferol 25 MCG (1000 UT) tablet, Take 1 tablet by mouth Daily., Disp: , Rfl:     clindamycin (CLEOCIN) 300 MG capsule, Take 1 capsule by mouth 4 (Four) Times a Day., Disp: , Rfl:     famotidine (PEPCID) 40 MG tablet, TAKE 1 TABLET BY MOUTH DAILY, Disp: 90 tablet, Rfl: 0    gabapentin (NEURONTIN) 100 MG capsule, TAKE 3 CAPSULES BY MOUTH THREE  "TIMES DAILY AS NEEDED FOR PAIN, Disp: 180 capsule, Rfl: 1    HYDROcodone-acetaminophen (NORCO)  MG per tablet, Take 1 tablet by mouth 3 (Three) Times a Day As Needed for Severe Pain. DNF before 8/22/2024, Disp: 90 tablet, Rfl: 0    ibuprofen (ADVIL,MOTRIN) 800 MG tablet, Take 1 tablet by mouth Every 6 (Six) Hours As Needed., Disp: , Rfl:     metoprolol-hydrochlorothiazide (LOPRESSOR HCT) 50-25 MG per tablet, TAKE 1 TABLET BY MOUTH DAILY, Disp: 90 tablet, Rfl: 0    valACYclovir (VALTREX) 1000 MG tablet, valacyclovir 1 gram tablet  TAKE ONE (1) TABLET BY MOUTH DAILY, Disp: , Rfl:     naloxone (NARCAN) 4 MG/0.1ML nasal spray, Call 911. Don't prime. Tea in 1 nostril for overdose. Repeat in 2-3 minutes in other nostril if no or minimal breathing/responsiveness., Disp: 2 each, Rfl: 0    Review of Systems     Objective     /82 (BP Location: Left arm, Patient Position: Sitting) Comment: manual  Pulse 76   Temp 99.3 °F (37.4 °C) (Oral)   Resp 18   Ht 166.4 cm (65.5\")   Wt 96.8 kg (213 lb 6.4 oz)   SpO2 93%   BMI 34.97 kg/m²         Physical Exam  Vitals and nursing note reviewed.   Constitutional:       General: She is not in acute distress.     Appearance: Normal appearance. She is obese. She is not ill-appearing or diaphoretic.      Comments: Changing positions frequently during encounter, reports she is more comfortable standing than sitting   HENT:      Head: Normocephalic and atraumatic.      Nose: Nose normal.      Mouth/Throat:      Mouth: Mucous membranes are moist.   Eyes:      General: No scleral icterus.     Conjunctiva/sclera: Conjunctivae normal.   Cardiovascular:      Rate and Rhythm: Normal rate and regular rhythm.      Pulses: Normal pulses.      Heart sounds: Normal heart sounds.   Pulmonary:      Effort: Pulmonary effort is normal. No respiratory distress.      Breath sounds: Normal breath sounds.   Abdominal:      General: Bowel sounds are normal. There is no distension.      " Palpations: Abdomen is soft.      Tenderness: There is no abdominal tenderness. There is no guarding.   Musculoskeletal:      Cervical back: Normal range of motion and neck supple.      Right lower leg: No edema.      Left lower leg: No edema.   Lymphadenopathy:      Cervical: No cervical adenopathy.   Skin:     General: Skin is warm and dry.      Capillary Refill: Capillary refill takes less than 2 seconds.      Coloration: Skin is not jaundiced.   Neurological:      Mental Status: She is alert and oriented to person, place, and time.   Psychiatric:         Mood and Affect: Mood normal.         Behavior: Behavior normal.         Thought Content: Thought content normal.         Judgment: Judgment normal.         PHQ-2 Depression Screening  Little interest or pleasure in doing things? 0-->not at all   Feeling down, depressed, or hopeless? 0-->not at all   PHQ-2 Total Score 0      Result Review    The following data was reviewed by: KELLI Bryant on 08/27/2024:  CMP          3/22/2024    12:30 5/20/2024    12:57   CMP   Glucose 96  94    BUN 13  11    Creatinine 0.78  0.77    EGFR 86.5  87.9    Sodium 141  139    Potassium 4.3  4.0    Chloride 105  103    Calcium 9.4  9.5    Total Protein 7.3     Albumin 4.3     Globulin 3.0     Total Bilirubin 0.3     Alkaline Phosphatase 84     AST (SGOT) 17     ALT (SGPT) 18     Albumin/Globulin Ratio 1.4     BUN/Creatinine Ratio 16.7  14.3    Anion Gap 6.0  8.0      CBC          3/22/2024    12:30 5/20/2024    12:57   CBC   WBC 5.82  5.49    RBC 4.50  4.44    Hemoglobin 13.7  13.9    Hematocrit 41.2  39.6    MCV 91.6  89.2    MCH 30.4  31.3    MCHC 33.3  35.1    RDW 12.6  12.2    Platelets 356  405      Lipid Panel          3/22/2024    12:30   Lipid Panel   Total Cholesterol 194    Triglycerides 140    HDL Cholesterol 52    VLDL Cholesterol 25    LDL Cholesterol  117    LDL/HDL Ratio 2.19      TSH          3/22/2024    12:30   TSH   TSH 1.040      A1C Last 3 Results           3/22/2024    12:30 5/20/2024    12:57   HGBA1C Last 3 Results   Hemoglobin A1C 6.30  6.10      Data reviewed : Cardiology studies      HEART RATE= 66  bpm  RR Interval= 912  ms  PA Interval= 202  ms  P Horizontal Axis= 16  deg  P Front Axis= 2  deg  QRSD Interval= 92  ms  QT Interval= 385  ms  QTcB= 403  ms  QRS Axis= 10  deg  T Wave Axis= 17  deg  - NORMAL ECG -  Sinus rhythm  When compared with ECG of 22-Mar-2024 12:35:20,  No significant change  Electronically Signed By: Steffanie Mckinney (DIA) 20-May-2024 15:43:12  Date and Time of Study: 2024-05-20 13:10:09        Assessment & Plan    Diagnoses and all orders for this visit:    1. Encounter for annual physical exam (Primary)    2. Prediabetes    3. Hyperlipidemia, unspecified hyperlipidemia type    4. Dysphagia, unspecified type  -     Ambulatory Referral to Gastroenterology    5. Gastroesophageal reflux disease, unspecified whether esophagitis present    6. Lumbar radiculopathy    7. Class 1 obesity due to excess calories with serious comorbidity and body mass index (BMI) of 34.0 to 34.9 in adult    8. Primary hypertension  -     Basic Metabolic Panel; Future    9. Screening for colorectal cancer  -     Ambulatory Referral to Gastroenterology       Patient Instructions   Please call Dr. Saab's office and update your primary care so that they can send us your record of previous pap.  Call to schedule mammogram - order was placed previously.  Check insurance to see if you need to have tdap in office or at pharmacy.    Follow up with dentistry per their recommendations.  Continue current medications and treatment.   Call the office 2 weeks before follow up appointment and will place lab orders.  Have labs drawn 3-4 days prior to f/u appointment.   Go to er if drooling or unable to swallow.   Advised to call clinic 1-2 days after having tests to obtain results if she has not received communication from office.   Advised to contact dental provider if  worsening tooth pain, temperature >100.5 degrees F.   Follow up with neurosurgery, pain management, physical therapy per their recommendations.   Call weight management center to schedule appointment at your convenience.     Ages 40 to 64 Counseling/Anticipatory Guidance Discussed: physical activity, healthy weight, injury prevention, sexual behavior and STDs, dental health, mental health, immunizations, screenings, and self-breast exam    Follow Up   Return in about 6 months (around 2/27/2025) for Recheck, Hypertension, Hyperlipidemia, prediabetes.    Patient was given instructions and counseling regarding her condition or for health maintenance advice. Please see specific information pulled into the AVS if appropriate.     Radha Marsh, APRN     08/27/24

## 2024-09-06 ENCOUNTER — TELEPHONE (OUTPATIENT)
Dept: ORTHOPEDIC SURGERY | Facility: CLINIC | Age: 59
End: 2024-09-06
Payer: COMMERCIAL

## 2024-09-06 ENCOUNTER — LAB (OUTPATIENT)
Dept: LAB | Facility: HOSPITAL | Age: 59
End: 2024-09-06
Payer: COMMERCIAL

## 2024-09-06 ENCOUNTER — OFFICE VISIT (OUTPATIENT)
Dept: FAMILY MEDICINE CLINIC | Facility: CLINIC | Age: 59
End: 2024-09-06
Payer: COMMERCIAL

## 2024-09-06 VITALS
OXYGEN SATURATION: 93 % | TEMPERATURE: 97.7 F | BODY MASS INDEX: 34.23 KG/M2 | SYSTOLIC BLOOD PRESSURE: 126 MMHG | HEART RATE: 93 BPM | DIASTOLIC BLOOD PRESSURE: 70 MMHG | WEIGHT: 213 LBS | HEIGHT: 66 IN

## 2024-09-06 DIAGNOSIS — I10 PRIMARY HYPERTENSION: ICD-10-CM

## 2024-09-06 DIAGNOSIS — B37.0 THRUSH: ICD-10-CM

## 2024-09-06 DIAGNOSIS — R22.0 RIGHT FACIAL SWELLING: ICD-10-CM

## 2024-09-06 DIAGNOSIS — K04.7 DENTAL ABSCESS: Primary | ICD-10-CM

## 2024-09-06 DIAGNOSIS — R68.83 CHILLS WITHOUT FEVER: ICD-10-CM

## 2024-09-06 DIAGNOSIS — K04.7 DENTAL ABSCESS: ICD-10-CM

## 2024-09-06 LAB
BASOPHILS # BLD AUTO: 0.04 10*3/MM3 (ref 0–0.2)
BASOPHILS NFR BLD AUTO: 0.8 % (ref 0–1.5)
DEPRECATED RDW RBC AUTO: 42.6 FL (ref 37–54)
EOSINOPHIL # BLD AUTO: 0.15 10*3/MM3 (ref 0–0.4)
EOSINOPHIL NFR BLD AUTO: 3 % (ref 0.3–6.2)
ERYTHROCYTE [DISTWIDTH] IN BLOOD BY AUTOMATED COUNT: 13 % (ref 12.3–15.4)
HCT VFR BLD AUTO: 40.7 % (ref 34–46.6)
HGB BLD-MCNC: 13.5 G/DL (ref 12–15.9)
IMM GRANULOCYTES # BLD AUTO: 0.01 10*3/MM3 (ref 0–0.05)
IMM GRANULOCYTES NFR BLD AUTO: 0.2 % (ref 0–0.5)
LYMPHOCYTES # BLD AUTO: 2.19 10*3/MM3 (ref 0.7–3.1)
LYMPHOCYTES NFR BLD AUTO: 44.2 % (ref 19.6–45.3)
MCH RBC QN AUTO: 29.7 PG (ref 26.6–33)
MCHC RBC AUTO-ENTMCNC: 33.2 G/DL (ref 31.5–35.7)
MCV RBC AUTO: 89.6 FL (ref 79–97)
MONOCYTES # BLD AUTO: 0.58 10*3/MM3 (ref 0.1–0.9)
MONOCYTES NFR BLD AUTO: 11.7 % (ref 5–12)
NEUTROPHILS NFR BLD AUTO: 1.98 10*3/MM3 (ref 1.7–7)
NEUTROPHILS NFR BLD AUTO: 40.1 % (ref 42.7–76)
NRBC BLD AUTO-RTO: 0 /100 WBC (ref 0–0.2)
PLATELET # BLD AUTO: 347 10*3/MM3 (ref 140–450)
PMV BLD AUTO: 9.7 FL (ref 6–12)
RBC # BLD AUTO: 4.54 10*6/MM3 (ref 3.77–5.28)
WBC NRBC COR # BLD AUTO: 4.95 10*3/MM3 (ref 3.4–10.8)

## 2024-09-06 PROCEDURE — 99214 OFFICE O/P EST MOD 30 MIN: CPT | Performed by: NURSE PRACTITIONER

## 2024-09-06 PROCEDURE — 80048 BASIC METABOLIC PNL TOTAL CA: CPT

## 2024-09-06 PROCEDURE — 85025 COMPLETE CBC W/AUTO DIFF WBC: CPT

## 2024-09-06 PROCEDURE — 87040 BLOOD CULTURE FOR BACTERIA: CPT

## 2024-09-06 PROCEDURE — 36415 COLL VENOUS BLD VENIPUNCTURE: CPT

## 2024-09-06 RX ORDER — AMOXICILLIN AND CLAVULANATE POTASSIUM 500; 125 MG/1; MG/1
1 TABLET, FILM COATED ORAL 3 TIMES DAILY
COMMUNITY
Start: 2024-09-04

## 2024-09-06 RX ORDER — FLUCONAZOLE 150 MG/1
150 TABLET ORAL
COMMUNITY
Start: 2024-09-04

## 2024-09-06 NOTE — PROGRESS NOTES
Subjective        Rita Prado is a 59 y.o. female who presents to [facility].     Chief Complaint   Patient presents with    gum pain       History of Present Illness    Patient presents with c/o gum pain, facial swelling, thrush.     Right face pain/swelling - Patient was seen by dentistry Dr. Proctor in 8/2024 for a tooth infection, prescribed patient course of clindamycin for tooth infection, advised her to return on 9/9/2024 to have root canal performed.  Three days ago her right side of her face swelled up and she developed severe right facial pain. She called [facility] dental where they attempted to perform emergency root canal.  They were not able to complete procedure because she was not able to tolerate as numbing medicine kept wearing off.  She developed thrush in her mouth and was given rx for diflucan 150mg po once and repeat in 3 days.  She has taken 1 dose thus far.  She is worried that she is going to get an infection that could spread to her heart.  Dentistry reportedly plans to bring her back in 2 weeks to have root canal completed.  She has not contacted [facility] dentistry today to let them know about persistent facial pain and swelling, states she wanted to see primary care to make sure this was reported.  She took course of cleocin 300mg po 4x daily that finished 4 days ago, was given rx 3 days ago for augmentin 500-125mg tablet 3 x day for 7 days, was not able to obtain rx for augmentin until yesterday.  She has had chills, isn't sure if she has had a fever at home, hasn't taken her temperature.   She has not taken ibuprofen or Tylenol but does take Norco which contains acetaminophen.  She c/o nausea but no vomiting.     The following portions of the patient's history were reviewed and updated as appropriate: allergies, current medications, past family history, past medical history, past social history, past surgical history and problem list.    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      Progesterone Nausea And Vomiting          Current Outpatient Medications:     amoxicillin-clavulanate (AUGMENTIN) 500-125 MG per tablet, Take 1 tablet by mouth 3 (Three) Times a Day., Disp: , Rfl:     baclofen (LIORESAL) 10 MG tablet, Take 1 tablet by mouth 3 (Three) Times a Day., Disp: , Rfl:     Cholecalciferol 25 MCG (1000 UT) tablet, Take 1 tablet by mouth Daily., Disp: , Rfl:     famotidine (PEPCID) 40 MG tablet, TAKE 1 TABLET BY MOUTH DAILY, Disp: 90 tablet, Rfl: 0    fluconazole (DIFLUCAN) 150 MG tablet, Take 1 tablet by mouth., Disp: , Rfl:     gabapentin (NEURONTIN) 100 MG capsule, TAKE 3 CAPSULES BY MOUTH THREE TIMES DAILY AS NEEDED FOR PAIN, Disp: 180 capsule, Rfl: 1    HYDROcodone-acetaminophen (NORCO)  MG per tablet, Take 1 tablet by mouth 3 (Three) Times a Day As Needed for Severe Pain. DNF before 8/22/2024, Disp: 90 tablet, Rfl: 0    ibuprofen (ADVIL,MOTRIN) 800 MG tablet, Take 1 tablet by mouth Every 6 (Six) Hours As Needed., Disp: , Rfl:     metoprolol-hydrochlorothiazide (LOPRESSOR HCT) 50-25 MG per tablet, TAKE 1 TABLET BY MOUTH DAILY, Disp: 90 tablet, Rfl: 0    valACYclovir (VALTREX) 1000 MG tablet, valacyclovir 1 gram tablet  TAKE ONE (1) TABLET BY MOUTH DAILY, Disp: , Rfl:     Review of Systems     Objective     /70 (BP Location: Left arm, Patient Position: Sitting, Cuff Size: Adult)   Pulse 93   Temp 97.7 °F (36.5 °C) (Infrared)   Ht 166.4 cm (65.51\")   Wt 96.6 kg (213 lb)   SpO2 93%   BMI 34.89 kg/m²         Physical Exam  Vitals and nursing note reviewed.   Constitutional:       General: She is not in acute distress.     Appearance: Normal appearance. She is obese. She is not ill-appearing or diaphoretic.   HENT:      Head:        Comments: Moderate swelling and pain with palpation, no warmth, no induration     Mouth/Throat:        Comments: Numerous missing teeth, " scattered white patches on tongue and oral mucosa, profound tenderness with palpation of right upper gum, mild swelling of gum surrounding right cuspid, no drainage from gum  Eyes:      General: No scleral icterus.     Conjunctiva/sclera: Conjunctivae normal.   Cardiovascular:      Rate and Rhythm: Normal rate and regular rhythm.      Pulses: Normal pulses.      Heart sounds: Normal heart sounds.   Pulmonary:      Effort: Pulmonary effort is normal. No respiratory distress.      Breath sounds: Normal breath sounds.   Musculoskeletal:      Cervical back: Normal range of motion and neck supple. No rigidity.      Right lower leg: No edema.      Left lower leg: No edema.   Skin:     General: Skin is warm and dry.      Capillary Refill: Capillary refill takes less than 2 seconds.      Coloration: Skin is not jaundiced.   Neurological:      Mental Status: She is alert and oriented to person, place, and time.   Psychiatric:         Mood and Affect: Mood normal.         Behavior: Behavior normal.         Thought Content: Thought content normal.         Judgment: Judgment normal.           Result Review                   Assessment & Plan    Diagnoses and all orders for this visit:    1. Dental abscess (Primary)  -     CBC & Differential; Future  -     Blood Culture - Blood,; Future  -     Blood Culture - Blood,; Future    2. Thrush  -     CBC & Differential; Future  -     Blood Culture - Blood,; Future  -     Blood Culture - Blood,; Future    3. Chills without fever  -     CBC & Differential; Future  -     Blood Culture - Blood,; Future  -     Blood Culture - Blood,; Future    4. Right facial swelling    I recommend patient go to emergency room for urgent evaluation to include labs and CT.  I discussed with her concerns of serious complications including but not limited to abscess, sepsis.    Patient Instructions   Patient declines ER evaluation.  Call office for lab results if you have not received a call from our office  or [employer] message in 1-2 days.  Patient is aware that lab results may not be available until Monday.  Please contact dental provider to let them know about your symptoms.   Patient agrees to go to ER if fever develops, unable to open mouth, worsening pain or swelling.  Please contact [facility] dentistry now to let them know about your symptoms and see if you can be seen today or tomorrow morning as they have Saturday hours.   Continue current medications.     Follow Up   Return if symptoms worsen or fail to improve, for Next scheduled follow up.    Patient was given instructions and counseling regarding her condition or for health maintenance advice. Please see specific information pulled into the AVS if appropriate.     Radha Marsh, APRN     09/06/24

## 2024-09-06 NOTE — PATIENT INSTRUCTIONS
Patient declines ER evaluation.  Call office for lab results if you have not received a call from our office or [employer] message in 1-2 days.  Patient is aware that lab results may not be available until Monday.  Please contact dental provider to let them know about your symptoms.   Patient agrees to go to ER if fever develops, unable to open mouth, worsening pain or swelling.  Please contact [facility] dentistry now to let them know about your symptoms and see if you can be seen today or tomorrow morning as they have Saturday hours.

## 2024-09-06 NOTE — TELEPHONE ENCOUNTER
Caller: MARY YEBOAH/ [facility]AGOT [facility]OLAND P/T    Relationship: PHYSICAL THERAPY    Best call back number: [phone]    What form or medical record are you requesting: SIGNED PLAN OF CARE, FAXED ON 08/26/24    Who is requesting this form or medical record from you: [employer]    How would you like to receive the form or medical records (pick-up, mail, fax): FAX  If fax, what is the fax number: [phone]    Timeframe paperwork needed: ASAP

## 2024-09-07 LAB
ANION GAP SERPL CALCULATED.3IONS-SCNC: 9 MMOL/L (ref 5–15)
BUN SERPL-MCNC: 10 MG/DL (ref 8–23)
BUN/CREAT SERPL: 15.9 (ref 7–25)
CALCIUM SPEC-SCNC: 10.1 MG/DL (ref 8.6–10.5)
CHLORIDE SERPL-SCNC: 102 MMOL/L (ref 98–107)
CO2 SERPL-SCNC: 29 MMOL/L (ref 22–29)
CREAT SERPL-MCNC: 0.63 MG/DL (ref 0.57–1)
EGFRCR SERPLBLD CKD-EPI 2021: 101.1 ML/MIN/1.73
GLUCOSE SERPL-MCNC: 76 MG/DL (ref 65–99)
POTASSIUM SERPL-SCNC: 4 MMOL/L (ref 3.5–5.2)
SODIUM SERPL-SCNC: 140 MMOL/L (ref 136–145)

## 2024-09-10 ENCOUNTER — OFFICE VISIT (OUTPATIENT)
Dept: PAIN MEDICINE | Facility: CLINIC | Age: 59
End: 2024-09-10
Payer: COMMERCIAL

## 2024-09-10 VITALS
SYSTOLIC BLOOD PRESSURE: 129 MMHG | HEART RATE: 85 BPM | WEIGHT: 213 LBS | RESPIRATION RATE: 16 BRPM | DIASTOLIC BLOOD PRESSURE: 84 MMHG | BODY MASS INDEX: 34.89 KG/M2 | OXYGEN SATURATION: 97 %

## 2024-09-10 DIAGNOSIS — M47.817 LUMBOSACRAL SPONDYLOSIS WITHOUT MYELOPATHY: ICD-10-CM

## 2024-09-10 DIAGNOSIS — M96.1 POSTLAMINECTOMY SYNDROME OF CERVICAL REGION: ICD-10-CM

## 2024-09-10 DIAGNOSIS — Z79.899 HIGH RISK MEDICATION USE: ICD-10-CM

## 2024-09-10 DIAGNOSIS — Z79.899 HIGH RISK MEDICATION USE: Primary | ICD-10-CM

## 2024-09-10 DIAGNOSIS — G89.4 CHRONIC PAIN SYNDROME: Primary | ICD-10-CM

## 2024-09-10 PROCEDURE — 99214 OFFICE O/P EST MOD 30 MIN: CPT | Performed by: ANESTHESIOLOGY

## 2024-09-10 RX ORDER — HYDROCODONE BITARTRATE AND ACETAMINOPHEN 7.5; 325 MG/1; MG/1
1 TABLET ORAL 4 TIMES DAILY PRN
Qty: 120 TABLET | Refills: 0 | Status: SHIPPED | OUTPATIENT
Start: 2024-10-23

## 2024-09-10 RX ORDER — IBUPROFEN 800 MG/1
800 TABLET, FILM COATED ORAL 3 TIMES DAILY PRN
Qty: 90 TABLET | Refills: 5 | Status: SHIPPED | OUTPATIENT
Start: 2024-09-10

## 2024-09-10 RX ORDER — HYDROCODONE BITARTRATE AND ACETAMINOPHEN 7.5; 325 MG/1; MG/1
1 TABLET ORAL 4 TIMES DAILY PRN
Qty: 120 TABLET | Refills: 0 | Status: SHIPPED | OUTPATIENT
Start: 2024-09-24

## 2024-09-10 NOTE — PROGRESS NOTES
Subjective    CC back pain  Rita Prado is a 59 y.o. female with polyarthralgia S/P bilateral shoulder surgeries, chronic neck pain S/P ACDF Oct 2018/Dr. Tai,  S/P  right L5-S1 laminectomy with Dr. Presley , here for f/u.     Continues to recover from laminectomy, in physical therapy.  Complains of continued back pain and some right lower extremity radicular pain/bilateral hip pain.  Mild relief with hydrocodone but not lasting long enough.  Pain is interfering with ADL and physical therapy.  Has not returned to work.    Chronic mid back/thoracic back pain associated with significant paraspinal muscle spasm and pain.  Chronic lower back pain radiating to bilateral lower extremity usually radiating to right hip, right leg worse with standing prolonged sitting or activity.  Denies weakness, bladder bowel continence.  Chronic neck pain from postlaminectomy syndrome with paraspinal muscle spasm radiating to bilateral shoulders.     Pain interfere with ADL/sleep and work.   Good relief of caudal UBALDO, transforaminal UBALDO, cervical UBALDO.    Utilizes hydrocodone with good relief of functional benefits and denies side effects.    L-spine MRI reviewed 2021 showing moderate degenerative changes progressed from previous.  Notably disc bulge with superimposed small right paracentral disc extrusion traversing 3 mm inferiorly narrowing the right lateral recess and abutting the descending right S1 nerve root. Mild bilateral facet arthropathy. Mild spinal canal stenosis. Moderate to severe right and severe left neural foraminal stenosis.     C-spine MRI multilevel degenerative changes, slight retrolisthesis C5-C6 asymmetry to the left.  Multiple level foraminal narrowing.  Osteophyte complex..     Pain Assessment   Location of Pain: Lower Back, R Hip, L Hip, L/R Leg, neck pain, joint  Description of Pain: Dull/Aching, Throbbing, Stabbing  Previous Pain Rating :6  Current Pain Ratin  Aggravating Factors:  Activity  Alleviating Factors: Rest, Medication    PEG Assessment   What number best describes your pain on average in the past week?7  What number best describes how, during the past week, pain has interfered with your enjoyment of life?7  What number best describes how, during the past week, pain has interfered with your general activity? 10     The following portions of the patient's history were reviewed and updated as appropriate: allergies, current medications, past family history, past medical history, past social history, past surgical history and problem list.      Review of Systems   Musculoskeletal:  Positive for back pain.        Left leg pain and weakness   All other systems reviewed and are negative.    Objective   Physical Exam  Vitals reviewed.   Constitutional:       General: She is not in acute distress.  Pulmonary:      Effort: Pulmonary effort is normal.   Musculoskeletal:      Cervical back: Tenderness present. Decreased range of motion.      Lumbar back: Tenderness present. Decreased range of motion. Positive right straight leg raise test and positive left straight leg raise test.      Comments: Lumbar loading positive, pain on extension of low back past 5 degrees.  TTP on the lumbar facets noted.  Jasmin bilaterally, positive Gaenslen bilaterally, positive SI compression test bilaterally       /84 (BP Location: Left arm, Patient Position: Sitting, Cuff Size: Adult)   Pulse 85   Resp 16   Wt 96.6 kg (213 lb)   LMP  (LMP Unknown)   SpO2 97%   BMI 34.89 kg/m²      PHQ 9 on chart  Opioid risk tool low risk    Assessment & Plan   Diagnoses and all orders for this visit:    1. Chronic pain syndrome (Primary)  -     HYDROcodone-acetaminophen (NORCO) 7.5-325 MG per tablet; Take 1 tablet by mouth 4 (Four) Times a Day As Needed for Severe Pain. DNF before 10/23/2024  Dispense: 120 tablet; Refill: 0  -     HYDROcodone-acetaminophen (NORCO) 7.5-325 MG per tablet; Take 1 tablet by mouth 4 (Four)  Times a Day As Needed for Severe Pain.  Dispense: 120 tablet; Refill: 0  -     ibuprofen (ADVIL,MOTRIN) 800 MG tablet; Take 1 tablet by mouth 3 (Three) Times a Day As Needed for Moderate Pain.  Dispense: 90 tablet; Refill: 5    2. Lumbosacral spondylosis without myelopathy    3. Postlaminectomy syndrome of cervical region    4. High risk medication use    Summary  Rita Prado is a 59 y.o. female with polyarthralgia S/P bilateral shoulder surgeries, chronic back pain, neck pain S/P cervical fusion Oct 2018/Dr. Tai S/P  right L5-S1 laminectomy with Dr. Presley, here for f/u.   Chronic pain from lumbar DDD spondylosis with occasional right lower extremity radicular pain..  Chronic neck pain postlaminectomy syndrome.  Repeat caudal as needed. Had 70% relief with last caudal lasting over 3 months.    Continues to recover from laminectomy, in physical therapy.  Complains of continued back pain and some right lower extremity radicular pain/bilateral hip pain.  Mild relief with hydrocodone but not lasting long enough.  Pain is interfering with ADL and physical therapy.  Has not returned to work.    Will change hydrocodone to 7.5/325 3-4 times daily.   UDS and inspect reviewed.   Discussed risk of tolerance, dependence, respiratory depression, coma and death associated with use of oral opioids for treatment of chronic nonmalignant pain.     Continue baclofen, ibuprofen as needed.    RTC 2-3mo

## 2024-09-11 ENCOUNTER — OFFICE (AMBULATORY)
Age: 59
End: 2024-09-11

## 2024-09-11 ENCOUNTER — OFFICE (AMBULATORY)
Dept: URBAN - METROPOLITAN AREA CLINIC 64 | Facility: CLINIC | Age: 59
End: 2024-09-11

## 2024-09-11 VITALS
HEART RATE: 71 BPM | DIASTOLIC BLOOD PRESSURE: 103 MMHG | DIASTOLIC BLOOD PRESSURE: 103 MMHG | HEIGHT: 65 IN | DIASTOLIC BLOOD PRESSURE: 102 MMHG | SYSTOLIC BLOOD PRESSURE: 140 MMHG | DIASTOLIC BLOOD PRESSURE: 103 MMHG | DIASTOLIC BLOOD PRESSURE: 102 MMHG | HEART RATE: 71 BPM | HEART RATE: 71 BPM | HEIGHT: 65 IN | SYSTOLIC BLOOD PRESSURE: 140 MMHG | HEART RATE: 71 BPM | HEART RATE: 71 BPM | HEIGHT: 65 IN | DIASTOLIC BLOOD PRESSURE: 103 MMHG | WEIGHT: 214 LBS | HEIGHT: 65 IN | DIASTOLIC BLOOD PRESSURE: 102 MMHG | DIASTOLIC BLOOD PRESSURE: 103 MMHG | WEIGHT: 214 LBS | SYSTOLIC BLOOD PRESSURE: 140 MMHG | HEIGHT: 65 IN | HEIGHT: 65 IN | HEIGHT: 65 IN | SYSTOLIC BLOOD PRESSURE: 140 MMHG | DIASTOLIC BLOOD PRESSURE: 102 MMHG | SYSTOLIC BLOOD PRESSURE: 140 MMHG | HEART RATE: 71 BPM | SYSTOLIC BLOOD PRESSURE: 140 MMHG | DIASTOLIC BLOOD PRESSURE: 102 MMHG | WEIGHT: 214 LBS | DIASTOLIC BLOOD PRESSURE: 103 MMHG | DIASTOLIC BLOOD PRESSURE: 103 MMHG | DIASTOLIC BLOOD PRESSURE: 102 MMHG | WEIGHT: 214 LBS | WEIGHT: 214 LBS | WEIGHT: 214 LBS | WEIGHT: 214 LBS | DIASTOLIC BLOOD PRESSURE: 102 MMHG | HEART RATE: 71 BPM | SYSTOLIC BLOOD PRESSURE: 140 MMHG

## 2024-09-11 DIAGNOSIS — Z12.11 ENCOUNTER FOR SCREENING FOR MALIGNANT NEOPLASM OF COLON: ICD-10-CM

## 2024-09-11 DIAGNOSIS — K59.03 DRUG INDUCED CONSTIPATION: ICD-10-CM

## 2024-09-11 DIAGNOSIS — K21.9 GASTRO-ESOPHAGEAL REFLUX DISEASE WITHOUT ESOPHAGITIS: ICD-10-CM

## 2024-09-11 DIAGNOSIS — R13.10 DYSPHAGIA, UNSPECIFIED: ICD-10-CM

## 2024-09-11 LAB
BACTERIA SPEC AEROBE CULT: NORMAL
BACTERIA SPEC AEROBE CULT: NORMAL

## 2024-09-11 PROCEDURE — 99204 OFFICE O/P NEW MOD 45 MIN: CPT

## 2024-09-11 RX ORDER — PANTOPRAZOLE SODIUM 20 MG/1
20 TABLET, DELAYED RELEASE ORAL
Qty: 90 | Refills: 3 | Status: ACTIVE
Start: 2024-09-11

## 2024-09-23 ENCOUNTER — OFFICE VISIT (OUTPATIENT)
Dept: NEUROSURGERY | Facility: CLINIC | Age: 59
End: 2024-09-23
Payer: COMMERCIAL

## 2024-09-23 VITALS
DIASTOLIC BLOOD PRESSURE: 99 MMHG | RESPIRATION RATE: 18 BRPM | HEIGHT: 66 IN | OXYGEN SATURATION: 97 % | BODY MASS INDEX: 34.23 KG/M2 | HEART RATE: 75 BPM | SYSTOLIC BLOOD PRESSURE: 139 MMHG | WEIGHT: 213 LBS

## 2024-09-23 DIAGNOSIS — M54.16 LUMBAR RADICULOPATHY: Primary | ICD-10-CM

## 2024-09-23 PROCEDURE — 99024 POSTOP FOLLOW-UP VISIT: CPT | Performed by: NEUROLOGICAL SURGERY

## 2024-09-26 DIAGNOSIS — I10 PRIMARY HYPERTENSION: ICD-10-CM

## 2024-09-26 RX ORDER — METOPROLOL TARTRATE AND HYDROCHLOROTHIAZIDE 50; 25 MG/1; MG/1
1 TABLET ORAL DAILY
Qty: 90 TABLET | Refills: 0 | Status: SHIPPED | OUTPATIENT
Start: 2024-09-26

## 2024-09-26 RX ORDER — BACLOFEN 10 MG/1
10 TABLET ORAL 3 TIMES DAILY PRN
Qty: 270 TABLET | Refills: 5 | Status: SHIPPED | OUTPATIENT
Start: 2024-09-26

## 2024-10-30 ENCOUNTER — OFFICE (AMBULATORY)
Age: 59
End: 2024-10-30
Payer: COMMERCIAL

## 2024-10-30 ENCOUNTER — ON CAMPUS - OUTPATIENT (AMBULATORY)
Dept: URBAN - METROPOLITAN AREA HOSPITAL 2 | Facility: HOSPITAL | Age: 59
End: 2024-10-30
Payer: COMMERCIAL

## 2024-10-30 ENCOUNTER — ON CAMPUS - OUTPATIENT (AMBULATORY)
Age: 59
End: 2024-10-30
Payer: COMMERCIAL

## 2024-10-30 ENCOUNTER — OFFICE (AMBULATORY)
Dept: URBAN - METROPOLITAN AREA PATHOLOGY 19 | Facility: PATHOLOGY | Age: 59
End: 2024-10-30
Payer: COMMERCIAL

## 2024-10-30 VITALS
OXYGEN SATURATION: 95 % | SYSTOLIC BLOOD PRESSURE: 98 MMHG | DIASTOLIC BLOOD PRESSURE: 91 MMHG | DIASTOLIC BLOOD PRESSURE: 77 MMHG | OXYGEN SATURATION: 96 % | OXYGEN SATURATION: 98 % | DIASTOLIC BLOOD PRESSURE: 101 MMHG | HEART RATE: 85 BPM | SYSTOLIC BLOOD PRESSURE: 99 MMHG | RESPIRATION RATE: 88 BRPM | OXYGEN SATURATION: 98 % | SYSTOLIC BLOOD PRESSURE: 98 MMHG | RESPIRATION RATE: 18 BRPM | HEIGHT: 65 IN | SYSTOLIC BLOOD PRESSURE: 115 MMHG | HEART RATE: 81 BPM | HEIGHT: 65 IN | DIASTOLIC BLOOD PRESSURE: 77 MMHG | HEART RATE: 87 BPM | OXYGEN SATURATION: 100 % | RESPIRATION RATE: 20 BRPM | SYSTOLIC BLOOD PRESSURE: 131 MMHG | HEART RATE: 86 BPM | SYSTOLIC BLOOD PRESSURE: 99 MMHG | SYSTOLIC BLOOD PRESSURE: 105 MMHG | HEART RATE: 85 BPM | SYSTOLIC BLOOD PRESSURE: 134 MMHG | DIASTOLIC BLOOD PRESSURE: 76 MMHG | HEART RATE: 80 BPM | HEART RATE: 87 BPM | OXYGEN SATURATION: 100 % | SYSTOLIC BLOOD PRESSURE: 150 MMHG | HEART RATE: 79 BPM | SYSTOLIC BLOOD PRESSURE: 140 MMHG | HEART RATE: 88 BPM | DIASTOLIC BLOOD PRESSURE: 107 MMHG | HEART RATE: 79 BPM | SYSTOLIC BLOOD PRESSURE: 141 MMHG | SYSTOLIC BLOOD PRESSURE: 115 MMHG | HEART RATE: 81 BPM | RESPIRATION RATE: 18 BRPM | DIASTOLIC BLOOD PRESSURE: 97 MMHG | HEART RATE: 84 BPM | DIASTOLIC BLOOD PRESSURE: 63 MMHG | SYSTOLIC BLOOD PRESSURE: 105 MMHG | SYSTOLIC BLOOD PRESSURE: 141 MMHG | DIASTOLIC BLOOD PRESSURE: 97 MMHG | SYSTOLIC BLOOD PRESSURE: 140 MMHG | TEMPERATURE: 96.4 F | WEIGHT: 208 LBS | DIASTOLIC BLOOD PRESSURE: 68 MMHG | HEART RATE: 84 BPM | RESPIRATION RATE: 88 BRPM | HEART RATE: 86 BPM | DIASTOLIC BLOOD PRESSURE: 63 MMHG | OXYGEN SATURATION: 95 % | SYSTOLIC BLOOD PRESSURE: 150 MMHG | DIASTOLIC BLOOD PRESSURE: 68 MMHG | DIASTOLIC BLOOD PRESSURE: 107 MMHG | RESPIRATION RATE: 20 BRPM | SYSTOLIC BLOOD PRESSURE: 134 MMHG | TEMPERATURE: 96.4 F | DIASTOLIC BLOOD PRESSURE: 76 MMHG | SYSTOLIC BLOOD PRESSURE: 131 MMHG | DIASTOLIC BLOOD PRESSURE: 101 MMHG | OXYGEN SATURATION: 96 % | DIASTOLIC BLOOD PRESSURE: 91 MMHG | HEART RATE: 88 BPM | WEIGHT: 208 LBS | HEART RATE: 80 BPM

## 2024-10-30 DIAGNOSIS — D12.3 BENIGN NEOPLASM OF TRANSVERSE COLON: ICD-10-CM

## 2024-10-30 DIAGNOSIS — K57.30 DIVERTICULOSIS OF LARGE INTESTINE WITHOUT PERFORATION OR ABS: ICD-10-CM

## 2024-10-30 DIAGNOSIS — Z12.11 ENCOUNTER FOR SCREENING FOR MALIGNANT NEOPLASM OF COLON: ICD-10-CM

## 2024-10-30 DIAGNOSIS — K57.32 DIVERTICULITIS OF LARGE INTESTINE WITHOUT PERFORATION OR ABS: ICD-10-CM

## 2024-10-30 DIAGNOSIS — R13.10 DYSPHAGIA, UNSPECIFIED: ICD-10-CM

## 2024-10-30 PROBLEM — K63.89 OTHER SPECIFIED DISEASES OF INTESTINE: Status: ACTIVE | Noted: 2024-10-30

## 2024-10-30 LAB
GI HISTOLOGY: A. TRANSVERSE COLON: (no result)
GI HISTOLOGY: A. TRANSVERSE COLON: (no result)
GI HISTOLOGY: B. SIGMOID COLON: (no result)
GI HISTOLOGY: B. SIGMOID COLON: (no result)
GI HISTOLOGY: PDF REPORT: (no result)
GI HISTOLOGY: PDF REPORT: (no result)

## 2024-10-30 PROCEDURE — 45380 COLONOSCOPY AND BIOPSY: CPT | Mod: 33 | Performed by: INTERNAL MEDICINE

## 2024-10-30 PROCEDURE — 88305 TISSUE EXAM BY PATHOLOGIST: CPT | Performed by: PATHOLOGY

## 2024-10-30 PROCEDURE — 43235 EGD DIAGNOSTIC BRUSH WASH: CPT | Performed by: INTERNAL MEDICINE

## 2024-10-30 PROCEDURE — 43450 DILATE ESOPHAGUS 1/MULT PASS: CPT | Performed by: INTERNAL MEDICINE

## 2024-10-30 RX ORDER — DOXYCYCLINE 100 MG/1
200 TABLET, FILM COATED ORAL
Qty: 20 | Refills: 1 | Status: COMPLETED
Start: 2024-10-30 | End: 2024-12-09

## 2024-10-30 RX ORDER — IBUPROFEN 800 MG/1
TABLET ORAL
Qty: 90 | Refills: 0 | Status: COMPLETED
End: 2024-10-30

## 2024-10-30 RX ORDER — DOXYCYCLINE 100 MG/1
200 TABLET, FILM COATED ORAL
Qty: 20 | Refills: 1 | Status: ACTIVE
Start: 2024-10-30

## 2024-11-06 ENCOUNTER — TELEPHONE (OUTPATIENT)
Dept: ORTHOPEDIC SURGERY | Facility: CLINIC | Age: 59
End: 2024-11-06
Payer: COMMERCIAL

## 2024-11-06 NOTE — TELEPHONE ENCOUNTER
Jessica with [facility] called about the plan of care that she said was faxed over on 11/3    Fax # [phone]  Phone # [phone]

## 2024-12-03 ENCOUNTER — OFFICE VISIT (OUTPATIENT)
Dept: PAIN MEDICINE | Facility: CLINIC | Age: 59
End: 2024-12-03
Payer: COMMERCIAL

## 2024-12-03 VITALS
WEIGHT: 212 LBS | HEART RATE: 75 BPM | SYSTOLIC BLOOD PRESSURE: 128 MMHG | RESPIRATION RATE: 16 BRPM | BODY MASS INDEX: 34.73 KG/M2 | OXYGEN SATURATION: 95 % | DIASTOLIC BLOOD PRESSURE: 96 MMHG

## 2024-12-03 DIAGNOSIS — M96.1 POSTLAMINECTOMY SYNDROME OF LUMBAR REGION: ICD-10-CM

## 2024-12-03 DIAGNOSIS — Z79.899 HIGH RISK MEDICATION USE: ICD-10-CM

## 2024-12-03 DIAGNOSIS — G89.4 CHRONIC PAIN SYNDROME: Primary | ICD-10-CM

## 2024-12-03 DIAGNOSIS — M96.1 POSTLAMINECTOMY SYNDROME OF CERVICAL REGION: ICD-10-CM

## 2024-12-03 PROCEDURE — 99214 OFFICE O/P EST MOD 30 MIN: CPT | Performed by: ANESTHESIOLOGY

## 2024-12-03 RX ORDER — HYDROCODONE BITARTRATE AND ACETAMINOPHEN 7.5; 325 MG/1; MG/1
1 TABLET ORAL 4 TIMES DAILY PRN
Qty: 120 TABLET | Refills: 0 | Status: SHIPPED | OUTPATIENT
Start: 2025-01-02

## 2024-12-03 RX ORDER — HYDROCODONE BITARTRATE AND ACETAMINOPHEN 7.5; 325 MG/1; MG/1
1 TABLET ORAL 4 TIMES DAILY PRN
Qty: 120 TABLET | Refills: 0 | Status: SHIPPED | OUTPATIENT
Start: 2024-12-03

## 2024-12-03 NOTE — PROGRESS NOTES
Subjective    CC back pain  Rita Prado is a 59 y.o. female with polyarthralgia S/P bilateral shoulder surgeries, chronic neck pain S/P ACDF Oct 2018/Dr. Tai,  S/P  right L5-S1 laminectomy with Dr. Presley , here for f/u.     Denies any new issues or concerns.  Continue polyarthralgia, back pain neck pain and myofascial pain.  Still participating physical therapy.  Continues to recover from laminectomy.    Chronic mid back/thoracic back pain associated with significant paraspinal muscle spasm and pain.  Chronic lower back pain radiating to bilateral lower extremity usually radiating to right hip, right leg worse with standing prolonged sitting or activity.  Denies weakness, bladder bowel continence.  Chronic neck pain from postlaminectomy syndrome with paraspinal muscle spasm radiating to bilateral shoulders.     Pain interfere with ADL/sleep and work.   Good relief of caudal UBALDO, transforaminal UBALDO, cervical UBALDO.    Utilizes hydrocodone with good relief of functional benefits and denies side effects.    L-spine MRI reviewed 2021 showing moderate degenerative changes progressed from previous.  Notably disc bulge with superimposed small right paracentral disc extrusion traversing 3 mm inferiorly narrowing the right lateral recess and abutting the descending right S1 nerve root. Mild bilateral facet arthropathy. Mild spinal canal stenosis. Moderate to severe right and severe left neural foraminal stenosis.     C-spine MRI multilevel degenerative changes, slight retrolisthesis C5-C6 asymmetry to the left.  Multiple level foraminal narrowing.  Osteophyte complex..     Pain Assessment   Location of Pain: Lower Back, R Hip, L Hip, L/R Leg, neck pain, joint  Description of Pain: Dull/Aching, Throbbing, Stabbing  Previous Pain Rating :6  Current Pain Ratin  Aggravating Factors: Activity  Alleviating Factors: Rest, Medication    PEG Assessment   What number best describes your pain on average in the past  week?7  What number best describes how, during the past week, pain has interfered with your enjoyment of life?7  What number best describes how, during the past week, pain has interfered with your general activity? 10     The following portions of the patient's history were reviewed and updated as appropriate: allergies, current medications, past family history, past medical history, past social history, past surgical history and problem list.      Review of Systems   Musculoskeletal:  Positive for back pain.        Left leg pain and weakness   All other systems reviewed and are negative.    Objective   Physical Exam  Vitals reviewed.   Constitutional:       General: She is not in acute distress.  Pulmonary:      Effort: Pulmonary effort is normal.   Musculoskeletal:      Cervical back: Tenderness present. Decreased range of motion.      Lumbar back: Tenderness present. Decreased range of motion. Positive right straight leg raise test and positive left straight leg raise test.      Comments: Lumbar loading positive, pain on extension of low back past 5 degrees.  TTP on the lumbar facets noted.  Jasmin bilaterally, positive Gaenslen bilaterally, positive SI compression test bilaterally       /96 (BP Location: Left arm, Patient Position: Sitting, Cuff Size: Adult)   Pulse 75   Resp 16   Wt 96.2 kg (212 lb)   LMP  (LMP Unknown)   SpO2 95%   BMI 34.73 kg/m²      PHQ 9 on chart  Opioid risk tool low risk    Assessment & Plan   Diagnoses and all orders for this visit:    1. Chronic pain syndrome (Primary)  -     HYDROcodone-acetaminophen (NORCO) 7.5-325 MG per tablet; Take 1 tablet by mouth 4 (Four) Times a Day As Needed for Severe Pain. DNF before 1/2/2025  Dispense: 120 tablet; Refill: 0  -     HYDROcodone-acetaminophen (NORCO) 7.5-325 MG per tablet; Take 1 tablet by mouth 4 (Four) Times a Day As Needed for Severe Pain.  Dispense: 120 tablet; Refill: 0    2. Postlaminectomy syndrome of cervical region    3.  Postlaminectomy syndrome of lumbar region    4. High risk medication use    Summary  Rita Prado is a 59 y.o. female with polyarthralgia S/P bilateral shoulder surgeries, chronic back pain, neck pain S/P cervical fusion Oct 2018/Dr. Tai S/P  right L5-S1 laminectomy with Dr. Presley, here for f/u.   Chronic pain from lumbar DDD spondylosis with occasional right lower extremity radicular pain..  Chronic neck pain postlaminectomy syndrome.  Repeat caudal as needed. Had 70% relief with last caudal lasting over 3 months.    Denies any new issues or concerns.  Continue polyarthralgia, back pain neck pain and myofascial pain.  Still participating physical therapy.  Continues to recover from laminectomy.  Continues to have good relief with hydrocodone without side effects.  Still having.    Continue hydrocodone to 7.5/325 3-4 times daily. UDS and inspect reviewed.   Discussed risk of tolerance, dependence, respiratory depression, coma and death associated with use of oral opioids for treatment of chronic nonmalignant pain.     Continue baclofen, ibuprofen as needed.    RTC 2-3mo

## 2024-12-09 ENCOUNTER — OFFICE (AMBULATORY)
Age: 59
End: 2024-12-09
Payer: COMMERCIAL

## 2024-12-09 ENCOUNTER — OFFICE (AMBULATORY)
Dept: URBAN - METROPOLITAN AREA CLINIC 64 | Facility: CLINIC | Age: 59
End: 2024-12-09
Payer: COMMERCIAL

## 2024-12-09 VITALS
HEIGHT: 65 IN | DIASTOLIC BLOOD PRESSURE: 89 MMHG | HEIGHT: 65 IN | SYSTOLIC BLOOD PRESSURE: 124 MMHG | HEIGHT: 65 IN | HEIGHT: 65 IN | WEIGHT: 211 LBS | SYSTOLIC BLOOD PRESSURE: 124 MMHG | DIASTOLIC BLOOD PRESSURE: 89 MMHG | HEIGHT: 65 IN | SYSTOLIC BLOOD PRESSURE: 124 MMHG | WEIGHT: 211 LBS | WEIGHT: 211 LBS | DIASTOLIC BLOOD PRESSURE: 89 MMHG | DIASTOLIC BLOOD PRESSURE: 89 MMHG | SYSTOLIC BLOOD PRESSURE: 124 MMHG | SYSTOLIC BLOOD PRESSURE: 124 MMHG | WEIGHT: 211 LBS | SYSTOLIC BLOOD PRESSURE: 124 MMHG | DIASTOLIC BLOOD PRESSURE: 89 MMHG | HEIGHT: 65 IN | WEIGHT: 211 LBS | DIASTOLIC BLOOD PRESSURE: 89 MMHG | WEIGHT: 211 LBS | HEIGHT: 65 IN | DIASTOLIC BLOOD PRESSURE: 89 MMHG | WEIGHT: 211 LBS | SYSTOLIC BLOOD PRESSURE: 124 MMHG

## 2024-12-09 DIAGNOSIS — K21.9 GASTRO-ESOPHAGEAL REFLUX DISEASE WITHOUT ESOPHAGITIS: ICD-10-CM

## 2024-12-09 DIAGNOSIS — K59.03 DRUG INDUCED CONSTIPATION: ICD-10-CM

## 2024-12-09 DIAGNOSIS — R13.10 DYSPHAGIA, UNSPECIFIED: ICD-10-CM

## 2024-12-09 DIAGNOSIS — K57.32 DIVERTICULITIS OF LARGE INTESTINE WITHOUT PERFORATION OR ABS: ICD-10-CM

## 2024-12-09 DIAGNOSIS — R14.0 ABDOMINAL DISTENSION (GASEOUS): ICD-10-CM

## 2024-12-09 PROCEDURE — 99213 OFFICE O/P EST LOW 20 MIN: CPT

## 2024-12-13 DIAGNOSIS — G89.4 CHRONIC PAIN SYNDROME: ICD-10-CM

## 2024-12-13 RX ORDER — HYDROCODONE BITARTRATE AND ACETAMINOPHEN 7.5; 325 MG/1; MG/1
1 TABLET ORAL 4 TIMES DAILY PRN
Qty: 120 TABLET | Refills: 0 | Status: SHIPPED | OUTPATIENT
Start: 2024-12-13

## 2024-12-14 DIAGNOSIS — K21.9 GASTROESOPHAGEAL REFLUX DISEASE, UNSPECIFIED WHETHER ESOPHAGITIS PRESENT: ICD-10-CM

## 2024-12-15 RX ORDER — FAMOTIDINE 40 MG/1
40 TABLET, FILM COATED ORAL DAILY
Qty: 90 TABLET | Refills: 0 | Status: SHIPPED | OUTPATIENT
Start: 2024-12-15

## 2024-12-17 ENCOUNTER — TELEPHONE (OUTPATIENT)
Dept: NEUROSURGERY | Facility: CLINIC | Age: 59
End: 2024-12-17
Payer: COMMERCIAL

## 2025-01-09 NOTE — PROGRESS NOTES
"Subjective   History of Present Illness: Rita Prado is a 59 y.o. female is here today for surgical follow-up. Today patient reports right buttock and hip pain.  Patient says that the shooting pain has resolved since her foraminotomy but she continues to have pain over her buttock that will go into her lateral thigh and into her groin.  She also has pain when she moves her right hip.  She is able to point to an area where the pain seems to come from over her SI joint on the right as well.  Chief Complaint   Patient presents with    Follow-up          Previous treatment: PO PT,     Previous neurosurgery:  Right lumbar L5-S1 foraminotomy 5/30/24     Previous injections:     The following portions of the patient's history were reviewed and updated as appropriate: allergies, current medications, past family history, past medical history, past social history, past surgical history, and problem list.    Review of Systems   Constitutional:  Positive for activity change.   Respiratory:  Negative for chest tightness and shortness of breath.    Cardiovascular:  Negative for chest pain.   Musculoskeletal:  Positive for back pain and myalgias.        + buttock pain   Hematological: Negative.    Psychiatric/Behavioral: Negative.         Objective      /94   Pulse 69   Resp 18   Ht 166.4 cm (65.51\")   Wt 94.8 kg (209 lb)   LMP  (LMP Unknown)   SpO2 97%   BMI 34.24 kg/m²    Body mass index is 34.24 kg/m².  There were no vitals filed for this visit.      Neurological Exam    4-4 positive provocative SI joint maneuvers on the    Assessment & Plan   Independent Review of Radiographic Studies:      I personally reviewed and interpreted the images from the following studies.    No new image    Medical Decision Making:      Rita Prado is a 59 y.o. female status post right L5-S1 foraminotomies who got relief of radicular symptoms following surgery but continues to have symptoms consistent with SI joint dysfunction " versus intrinsic hip issue.  She had a previous SI joint injection which provided no relief, however she says that the provider performing the injection said that they could not get full dosage of medication into the joint space at the time.  Given this we will send the patient for right SI joint injection.  I will also send the patient to orthopedics with a right hip x-ray to rule out intrinsic hip issue as the primary source of her pain.  I will see her back when she has gotten the SI joint injection and see an orthopedic      Diagnoses and all orders for this visit:    1. Sacroiliitis (Primary)    2. Right hip pain      No follow-ups on file.    This patient was examined wearing appropriate personal protective equipment.                      Dr. Chris Presley IV    01/13/25  13:32 EST

## 2025-01-13 ENCOUNTER — OFFICE VISIT (OUTPATIENT)
Dept: NEUROSURGERY | Facility: CLINIC | Age: 60
End: 2025-01-13
Payer: COMMERCIAL

## 2025-01-13 VITALS
SYSTOLIC BLOOD PRESSURE: 135 MMHG | RESPIRATION RATE: 18 BRPM | DIASTOLIC BLOOD PRESSURE: 94 MMHG | WEIGHT: 209 LBS | HEIGHT: 66 IN | OXYGEN SATURATION: 97 % | BODY MASS INDEX: 33.59 KG/M2 | HEART RATE: 69 BPM

## 2025-01-13 DIAGNOSIS — M46.1 SACROILIITIS: Primary | ICD-10-CM

## 2025-01-13 DIAGNOSIS — M25.551 RIGHT HIP PAIN: ICD-10-CM

## 2025-01-13 PROCEDURE — 99213 OFFICE O/P EST LOW 20 MIN: CPT | Performed by: NEUROLOGICAL SURGERY

## 2025-01-29 ENCOUNTER — HOSPITAL ENCOUNTER (OUTPATIENT)
Dept: PAIN MEDICINE | Facility: HOSPITAL | Age: 60
Discharge: HOME | End: 2025-01-29
Payer: COMMERCIAL

## 2025-01-29 ENCOUNTER — HOSPITAL ENCOUNTER (OUTPATIENT)
Dept: GENERAL RADIOLOGY | Facility: HOSPITAL | Age: 60
Discharge: HOME | End: 2025-01-29
Payer: COMMERCIAL

## 2025-01-29 ENCOUNTER — TELEPHONE (OUTPATIENT)
Dept: PAIN MEDICINE | Facility: HOSPITAL | Age: 60
End: 2025-01-29

## 2025-01-29 VITALS
SYSTOLIC BLOOD PRESSURE: 115 MMHG | DIASTOLIC BLOOD PRESSURE: 82 MMHG | TEMPERATURE: 96.9 F | OXYGEN SATURATION: 94 % | HEIGHT: 66 IN | BODY MASS INDEX: 33.59 KG/M2 | RESPIRATION RATE: 16 BRPM | WEIGHT: 209 LBS | HEART RATE: 82 BPM

## 2025-01-29 DIAGNOSIS — M46.1 SACROILIITIS: Primary | ICD-10-CM

## 2025-01-29 DIAGNOSIS — M25.551 RIGHT HIP PAIN: ICD-10-CM

## 2025-01-29 DIAGNOSIS — R52 PAIN: ICD-10-CM

## 2025-01-29 PROCEDURE — 25010000002 BUPIVACAINE (PF) 0.25 % SOLUTION: Performed by: ANESTHESIOLOGY

## 2025-01-29 PROCEDURE — 25010000002 METHYLPREDNISOLONE PER 40 MG: Performed by: ANESTHESIOLOGY

## 2025-01-29 PROCEDURE — 25510000001 IOPAMIDOL 41 % SOLUTION: Performed by: ANESTHESIOLOGY

## 2025-01-29 PROCEDURE — 73502 X-RAY EXAM HIP UNI 2-3 VIEWS: CPT

## 2025-01-29 PROCEDURE — 77003 FLUOROGUIDE FOR SPINE INJECT: CPT

## 2025-01-29 RX ORDER — IOPAMIDOL 408 MG/ML
3 INJECTION, SOLUTION INTRATHECAL
Status: COMPLETED | OUTPATIENT
Start: 2025-01-29 | End: 2025-01-29

## 2025-01-29 RX ORDER — BUPIVACAINE HYDROCHLORIDE 2.5 MG/ML
10 INJECTION, SOLUTION EPIDURAL; INFILTRATION; INTRACAUDAL ONCE
Status: COMPLETED | OUTPATIENT
Start: 2025-01-29 | End: 2025-01-29

## 2025-01-29 RX ORDER — METHYLPREDNISOLONE ACETATE 40 MG/ML
40 INJECTION, SUSPENSION INTRA-ARTICULAR; INTRALESIONAL; INTRAMUSCULAR; SOFT TISSUE ONCE
Status: COMPLETED | OUTPATIENT
Start: 2025-01-29 | End: 2025-01-29

## 2025-01-29 RX ADMIN — METHYLPREDNISOLONE ACETATE 40 MG: 40 INJECTION, SUSPENSION INTRA-ARTICULAR; INTRALESIONAL; INTRAMUSCULAR; INTRASYNOVIAL; SOFT TISSUE at 13:18

## 2025-01-29 RX ADMIN — IOPAMIDOL 3 ML: 408 INJECTION, SOLUTION INTRATHECAL at 13:18

## 2025-01-29 RX ADMIN — BUPIVACAINE HYDROCHLORIDE 10 ML: 2.5 INJECTION, SOLUTION EPIDURAL; INFILTRATION; INTRACAUDAL; PERINEURAL at 13:18

## 2025-01-30 ENCOUNTER — TELEPHONE (OUTPATIENT)
Dept: PAIN MEDICINE | Facility: HOSPITAL | Age: 60
End: 2025-01-30
Payer: COMMERCIAL

## 2025-01-30 NOTE — TELEPHONE ENCOUNTER
Post procedure phone call completed.  Pt states they are doing good and denies questions or concerns. Pain level is a 6.

## 2025-01-31 ENCOUNTER — OFFICE VISIT (OUTPATIENT)
Dept: ORTHOPEDIC SURGERY | Facility: CLINIC | Age: 60
End: 2025-01-31
Payer: COMMERCIAL

## 2025-01-31 VITALS — BODY MASS INDEX: 34.82 KG/M2 | RESPIRATION RATE: 20 BRPM | OXYGEN SATURATION: 95 % | HEIGHT: 65 IN | WEIGHT: 209 LBS

## 2025-01-31 DIAGNOSIS — M70.61 TROCHANTERIC BURSITIS OF RIGHT HIP: Primary | ICD-10-CM

## 2025-01-31 RX ORDER — LIDOCAINE HYDROCHLORIDE 10 MG/ML
2 INJECTION, SOLUTION EPIDURAL; INFILTRATION; INTRACAUDAL; PERINEURAL
Status: COMPLETED | OUTPATIENT
Start: 2025-01-31 | End: 2025-01-31

## 2025-01-31 RX ORDER — TRIAMCINOLONE ACETONIDE 40 MG/ML
80 INJECTION, SUSPENSION INTRA-ARTICULAR; INTRAMUSCULAR
Status: COMPLETED | OUTPATIENT
Start: 2025-01-31 | End: 2025-01-31

## 2025-01-31 RX ADMIN — TRIAMCINOLONE ACETONIDE 80 MG: 40 INJECTION, SUSPENSION INTRA-ARTICULAR; INTRAMUSCULAR at 13:10

## 2025-01-31 RX ADMIN — LIDOCAINE HYDROCHLORIDE 2 ML: 10 INJECTION, SOLUTION EPIDURAL; INFILTRATION; INTRACAUDAL; PERINEURAL at 13:10

## 2025-01-31 NOTE — PROGRESS NOTES
[facility]        Patient Name: Rita Prado  : 1965  Primary Care Physician: Radha Marsh APRN        Chief Complaint:    Chief Complaint   Patient presents with    Right Hip - Pain, Initial Evaluation     Pt can not lay on her right hip   Pain from her tailbone to around her right hip           HPI:   Rita Prado is a 59 y.o. year old who presents today for evaluation of right hip pain. Onset of symptoms was a few years ago and progressing with time. Pain location is the right, lateral hip. Pain will radiate down to the knee. She has some right groin pain but reports preserved ROM and strength in the right hip. She fell several year ago on the right side, but no recent trauma or injury. She also works at [employer] on the [employer] line. She follows with Dr. Presley for back and SI pain. Received a right SI joint injection with pain management 3 days ago that provided ~ 24 hours of relief.       Past Medical/Surgical, Social and Family History:  I have reviewed and/or updated pertinent history as noted in the medical record including:  Past Medical History:   Diagnosis Date    Achilles tendinitis     rt foot    Anxiety     Depression     Elevated hemoglobin A1c     GERD (gastroesophageal reflux disease)     Groin discomfort     inner left side    Hip pain     left    Hypertension     Leg pain     left    Low back pain     Obesity     PONV (postoperative nausea and vomiting)     Shoulder pain      Past Surgical History:   Procedure Laterality Date    LUMBAR DECOMPRESSION Right 2024    Procedure: Right lumbar L5-S1 foraminotomy;  Surgeon: Chris Presley IV, MD;  Location: [facility] MAIN OR;  Service: Neurosurgery;  Laterality: Right;    NECK SURGERY      ROTATOR CUFF REPAIR      Right    SHOULDER SURGERY Left     frozen shoulder     Social History     Occupational History    Not on file   Tobacco Use    Smoking status: Never     Passive exposure: Past    Smokeless tobacco: Never   Vaping Use    Vaping status:  Never Used   Substance and Sexual Activity    Alcohol use: Not Currently    Drug use: Not Currently    Sexual activity: Defer          Allergies:   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    famotidine (PEPCID) 40 MG tablet TAKE 1 TABLET BY MOUTH DAILY    fluconazole (DIFLUCAN) 150 MG tablet Take 1 tablet by mouth.    gabapentin (NEURONTIN) 100 MG capsule TAKE 3 CAPSULES BY MOUTH THREE TIMES DAILY AS NEEDED FOR PAIN    HYDROcodone-acetaminophen (NORCO) 7.5-325 MG per tablet Take 1 tablet by mouth 4 (Four) Times a Day As Needed for Severe Pain. DNF before 1/2/2025    HYDROcodone-acetaminophen (NORCO) 7.5-325 MG per tablet Take 1 tablet by mouth 4 (Four) Times a Day As Needed for Severe Pain.    ibuprofen (ADVIL,MOTRIN) 800 MG tablet Take 1 tablet by mouth 3 (Three) Times a Day As Needed for Moderate Pain.    metoprolol-hydrochlorothiazide (LOPRESSOR HCT) 50-25 MG per tablet TAKE 1 TABLET BY MOUTH DAILY    valACYclovir (VALTREX) 1000 MG tablet valacyclovir 1 gram tablet   TAKE ONE (1) TABLET BY MOUTH DAILY     No current facility-administered medications on file prior to visit.         ROS:  Negative unless listed in the HPI    Physical Exam:   59 y.o. female  Body mass index is 34.78 kg/m²., 94.8 kg (209 lb)  Vitals:    01/31/25 1229   Resp: 20   SpO2: 95%     General: Alert, cooperative, appears well and in no observable distress.   HEENT: Normocephalic, atraumatic on external visual inspection. No icterus.   CV: No significant peripheral edema.    Respiratory: Normal respiratory effort.   Skin: Warm & well perfused;  appropriate skin turgor.  Psych: Appropriate mood & affect.  Neuro: Gross sensation and motor intact in affected extremity/extremities.  Vascular: Peripheral pulses palpable in affected extremity/extremities.     Right Hip Exam     Range of Motion   External rotation:  normal   Internal rotation:  normal     Tests   FLO: negative           Hip Musculoskeletal Exam  Gait    Antalgic: right    Inspection    Right      Erythema: none        Ecchymosis: none        Edema: none        Deformity: none      Palpation    Right      Increased warmth: none      Tenderness: present        Greater trochanteric region pain: moderate    Range of Motion    Right      Active extension: 40.       Passive flexion: 140.       Active internal rotation: 45.       Passive external rotation: 45.       Passive adduction: 45.       Passive abduction: 45.     Special Tests    Right      Log roll test: negative      FLO test (right): negative      Impingement test: negative      Internal rotation: negative      External rotation: negative      Push/pull stress test: negative       Radiology:  X-Ray Report:  Right hip(s) X-Ray  Indication: Evaluation of pain  AP, Lateral views  Findings: moderate degenerative changes noted  Bony lesion: no  Soft tissues: within normal limits  Joint spaces: decreased  Hardware appropriately positioned: not applicable  Prior studies available for comparison: no     Large Joint Arthrocentesis: R greater trochanteric bursa  Date/Time: 1/31/2025 1:10 PM  Consent given by: patient  Site marked: site marked  Timeout: Immediately prior to procedure a time out was called to verify the correct patient, procedure, equipment, support staff and site/side marked as required   Supporting Documentation  Indications: pain and diagnostic evaluation   Procedure Details  Location: hip - R greater trochanteric bursa  Needle size: 25 G  Approach: lateral  Medications administered: 2 mL lidocaine PF 1% 1 %; 80 mg triamcinolone  acetonide 40 MG/ML  Patient tolerance: patient tolerated the procedure well with no immediate complications          Assessment:  Right greater trochanteric bursitis   Body mass index is 34.78 kg/m².  BMI consistent with Obese Class I: 30-34.9kg/m2           Plan:  I have reviewed the above imaging and assessment with the patient today in detail  Discussed treatment with a steroid injection as both therapeutic and diagnostic options  She was agreeable. Please see documentation above  BMI reviewed  Follow up in 2-3 weeks to evaluate progress   Patient encouraged to call with any questions or concerns in the interim    KELLI Sky

## 2025-02-03 NOTE — PROCEDURES
"Subjective    CC back pain  Rita Prado is a 59 y.o. female with right sacroiliitis here for repeat right SI injection.. No anticoagulation    Pain Assessment   Location of Pain: Lower Back, R Hip, L Hip, R Leg,   Description of Pain: Dull/Aching, Throbbing, Stabbing  Previous Pain Rating :7  Current Pain Ratin  Aggravating Factors: Activity  Alleviating Factors: Rest, Medication    The following portions of the patient's history were reviewed and updated as appropriate: allergies, current medications, past family history, past medical history, past social history, past surgical history and problem list.    Review of Systems  As in HPI  Objective   Physical Exam  Vitals reviewed.   Constitutional:       General: She is not in acute distress.  Pulmonary:      Effort: Pulmonary effort is normal.       /82 (BP Location: Right arm, Patient Position: Sitting)   Pulse 82   Temp 96.9 °F (36.1 °C) (Skin)   Resp 16   Ht 166.4 cm (65.51\")   Wt 94.8 kg (209 lb)   LMP  (LMP Unknown)   SpO2 94%   BMI 34.24 kg/m²     Assessment & Plan    underwent repeat  right SI injection.    RTC as needed.    DATE OF PROCEDURE:  2025    PREOPERATIVE DIAGNOSIS: sacroiliitis    POSTOPERATIVE DIAGNOSIS: same    PROCEDURE PERFORMED: Right SACROILIAC JOINT INJECTION    The patient understands the risks and benefits of the procedure and wishes to proceed. The patient was seen in the preoperative area. Patient's consent was obtained and updated. Vitals were taken. Patient was then brought to the procedure suite and placed in prone position for sacroiliac joint injection. The appropriate anatomic area was widely prepped with Chloraprep and draped in a sterile fashion. Noninvasive monitoring per routine anesthesia protocol was placed. Under fluoroscopic guidance using an AP view, a 22 gauge curved tip spinal needle was passed through skin anesthetized with 1% Lidocaine without epinephrine. The needle tip was guided to the " lower pole of the joint using fluoroscopy. 1 mL of  preservative free contrast was injected into the joint to confirm location. A clear outline was obtained and 5 mL of steroid solution containing 4 mL 0.25% bupivacaine, and 1mL 40mg Depomedrol was injected. The patient tolerated with no keke-procedural complications.  A sterile dressing was placed over the puncture sites.

## 2025-02-04 ENCOUNTER — OFFICE VISIT (OUTPATIENT)
Dept: PAIN MEDICINE | Facility: CLINIC | Age: 60
End: 2025-02-04
Payer: COMMERCIAL

## 2025-02-04 VITALS
BODY MASS INDEX: 34.11 KG/M2 | HEART RATE: 65 BPM | WEIGHT: 205 LBS | OXYGEN SATURATION: 95 % | RESPIRATION RATE: 16 BRPM | SYSTOLIC BLOOD PRESSURE: 127 MMHG | DIASTOLIC BLOOD PRESSURE: 88 MMHG

## 2025-02-04 DIAGNOSIS — M96.1 POSTLAMINECTOMY SYNDROME OF LUMBAR REGION: ICD-10-CM

## 2025-02-04 DIAGNOSIS — M96.1 POSTLAMINECTOMY SYNDROME OF CERVICAL REGION: ICD-10-CM

## 2025-02-04 DIAGNOSIS — M25.50 POLYARTHRALGIA: ICD-10-CM

## 2025-02-04 DIAGNOSIS — Z79.899 HIGH RISK MEDICATION USE: ICD-10-CM

## 2025-02-04 DIAGNOSIS — G89.4 CHRONIC PAIN SYNDROME: Primary | ICD-10-CM

## 2025-02-04 RX ORDER — HYDROCODONE BITARTRATE AND ACETAMINOPHEN 7.5; 325 MG/1; MG/1
1 TABLET ORAL 4 TIMES DAILY PRN
Qty: 120 TABLET | Refills: 0 | Status: SHIPPED | OUTPATIENT
Start: 2025-02-12

## 2025-02-04 RX ORDER — HYDROCODONE BITARTRATE AND ACETAMINOPHEN 7.5; 325 MG/1; MG/1
1 TABLET ORAL 4 TIMES DAILY PRN
Qty: 120 TABLET | Refills: 0 | Status: SHIPPED | OUTPATIENT
Start: 2025-03-12

## 2025-02-04 NOTE — PROGRESS NOTES
Subjective    CC back pain  Rita Prado is a 59 y.o. female with polyarthralgia S/P bilateral shoulder surgeries, chronic neck pain S/P ACDF Oct 2018/Dr. Tai,  S/P  right L5-S1 laminectomy with Dr. Presley , here for f/u.     Right SI injection as requested by neurosurgery last visit.  Reported 70 to 80% relief lasted 1 day.  Continues to have good relief with hydrocodone denies side effects.  Is now on disability, unable return to work.    Chronic mid back/thoracic back pain associated with significant paraspinal muscle spasm and pain.  Chronic lower back pain radiating to bilateral lower extremity usually radiating to right hip, right leg worse with standing prolonged sitting or activity.  Denies weakness, bladder bowel continence.  Chronic neck pain from postlaminectomy syndrome with paraspinal muscle spasm radiating to bilateral shoulders.     Pain interfere with ADL/sleep and work.   Good relief of caudal UBALDO, transforaminal UBALDO, cervical UBALDO.    Utilizes hydrocodone with good relief of functional benefits and denies side effects.    L-spine MRI reviewed 2021 showing moderate degenerative changes progressed from previous.  Notably disc bulge with superimposed small right paracentral disc extrusion traversing 3 mm inferiorly narrowing the right lateral recess and abutting the descending right S1 nerve root. Mild bilateral facet arthropathy. Mild spinal canal stenosis. Moderate to severe right and severe left neural foraminal stenosis.     C-spine MRI multilevel degenerative changes, slight retrolisthesis C5-C6 asymmetry to the left.  Multiple level foraminal narrowing.  Osteophyte complex..     Pain Assessment   Location of Pain: Lower Back, R Hip, L Hip, L/R Leg, neck pain, joint  Description of Pain: Dull/Aching, Throbbing, Stabbing  Previous Pain Rating :7  Current Pain Ratin  Aggravating Factors: Activity  Alleviating Factors: Rest, Medication    PEG Assessment   What number best describes your  pain on average in the past week?5  What number best describes how, during the past week, pain has interfered with your enjoyment of life?5  What number best describes how, during the past week, pain has interfered with your general activity? 10     The following portions of the patient's history were reviewed and updated as appropriate: allergies, current medications, past family history, past medical history, past social history, past surgical history and problem list.      Review of Systems   Musculoskeletal:  Positive for back pain.        Left leg pain and weakness   All other systems reviewed and are negative.    Objective   Physical Exam  Vitals reviewed.   Constitutional:       General: She is not in acute distress.  Pulmonary:      Effort: Pulmonary effort is normal.   Musculoskeletal:      Cervical back: Tenderness present. Decreased range of motion.      Lumbar back: Tenderness present. Decreased range of motion. Positive right straight leg raise test and positive left straight leg raise test.      Comments: Lumbar loading positive, pain on extension of low back past 5 degrees.  TTP on the lumbar facets noted.  Jasmin bilaterally, positive Gaenslen bilaterally, positive SI compression test bilaterally       /88 (BP Location: Left arm, Patient Position: Sitting, Cuff Size: Adult)   Pulse 65   Resp 16   Wt 93 kg (205 lb)   LMP  (LMP Unknown)   SpO2 95%   BMI 34.11 kg/m²      PHQ 9 on chart  Opioid risk tool low risk    Assessment & Plan   Diagnoses and all orders for this visit:    1. Chronic pain syndrome (Primary)  -     HYDROcodone-acetaminophen (NORCO) 7.5-325 MG per tablet; Take 1 tablet by mouth 4 (Four) Times a Day As Needed for Severe Pain. DNF before 3/12/2025  Dispense: 120 tablet; Refill: 0  -     HYDROcodone-acetaminophen (NORCO) 7.5-325 MG per tablet; Take 1 tablet by mouth 4 (Four) Times a Day As Needed for Severe Pain.  Dispense: 120 tablet; Refill: 0    2. Postlaminectomy syndrome  of cervical region    3. Postlaminectomy syndrome of lumbar region    4. Polyarthralgia    5. High risk medication use  -     Urine Drug Screen - Urine, Clean Catch; Future    Summary  Rita Prado is a 59 y.o. female with polyarthralgia S/P bilateral shoulder surgeries, chronic back pain, neck pain S/P cervical fusion Oct 2018/Dr. Tai S/P  right L5-S1 laminectomy with Dr. Presley, here for f/u.   Chronic pain from lumbar DDD spondylosis with occasional right lower extremity radicular pain..  Chronic neck pain postlaminectomy syndrome.  Repeat caudal as needed. Had 70% relief with last caudal lasting over 3 months.    Right SI injection as requested by neurosurgery last visit.  Reported 70 to 80% relief lasted 1 day.  Continues to have good relief with hydrocodone denies side effects.  Is now on disability, unable return to work.  Continues to recover from laminectomy, completed PT.    Continue hydrocodone to 7.5/325 3-4 times daily. UDS and inspect reviewed.   Discussed risk of tolerance, dependence, respiratory depression, coma and death associated with use of oral opioids for treatment of chronic nonmalignant pain.     Continue baclofen, ibuprofen as needed.    RTC 2-3mo

## 2025-02-25 ENCOUNTER — OFFICE VISIT (OUTPATIENT)
Dept: FAMILY MEDICINE CLINIC | Facility: CLINIC | Age: 60
End: 2025-02-25
Payer: COMMERCIAL

## 2025-02-25 ENCOUNTER — OFFICE VISIT (OUTPATIENT)
Dept: ORTHOPEDIC SURGERY | Facility: CLINIC | Age: 60
End: 2025-02-25
Payer: COMMERCIAL

## 2025-02-25 VITALS — RESPIRATION RATE: 20 BRPM | HEIGHT: 65 IN | WEIGHT: 208 LBS | OXYGEN SATURATION: 98 % | BODY MASS INDEX: 34.66 KG/M2

## 2025-02-25 VITALS
DIASTOLIC BLOOD PRESSURE: 89 MMHG | BODY MASS INDEX: 34.75 KG/M2 | OXYGEN SATURATION: 98 % | HEART RATE: 73 BPM | TEMPERATURE: 98.5 F | SYSTOLIC BLOOD PRESSURE: 126 MMHG | RESPIRATION RATE: 18 BRPM | HEIGHT: 65 IN | WEIGHT: 208.6 LBS

## 2025-02-25 DIAGNOSIS — M54.16 LUMBAR RADICULOPATHY: ICD-10-CM

## 2025-02-25 DIAGNOSIS — R13.10 DYSPHAGIA, UNSPECIFIED TYPE: ICD-10-CM

## 2025-02-25 DIAGNOSIS — M54.16 LUMBAR RADICULOPATHY: Primary | ICD-10-CM

## 2025-02-25 DIAGNOSIS — I10 PRIMARY HYPERTENSION: Primary | ICD-10-CM

## 2025-02-25 DIAGNOSIS — H61.21 EXCESSIVE CERUMEN IN EAR CANAL, RIGHT: ICD-10-CM

## 2025-02-25 DIAGNOSIS — R73.03 PREDIABETES: ICD-10-CM

## 2025-02-25 DIAGNOSIS — E78.5 HYPERLIPIDEMIA, UNSPECIFIED HYPERLIPIDEMIA TYPE: ICD-10-CM

## 2025-02-25 DIAGNOSIS — K21.9 GASTROESOPHAGEAL REFLUX DISEASE WITHOUT ESOPHAGITIS: ICD-10-CM

## 2025-02-25 PROCEDURE — 99214 OFFICE O/P EST MOD 30 MIN: CPT | Performed by: NURSE PRACTITIONER

## 2025-02-25 RX ORDER — PANTOPRAZOLE SODIUM 20 MG/1
20 TABLET, DELAYED RELEASE ORAL DAILY
COMMUNITY

## 2025-02-25 RX ORDER — VALACYCLOVIR HYDROCHLORIDE 1 G/1
1000 TABLET, FILM COATED ORAL DAILY
Qty: 90 TABLET | Refills: 1 | Status: SHIPPED | OUTPATIENT
Start: 2025-02-25

## 2025-02-25 RX ORDER — METOPROLOL TARTRATE AND HYDROCHLOROTHIAZIDE 50; 25 MG/1; MG/1
1 TABLET ORAL DAILY
Qty: 90 TABLET | Refills: 1 | Status: SHIPPED | OUTPATIENT
Start: 2025-02-25

## 2025-02-25 NOTE — PATIENT INSTRUCTIONS
Can try otc simethicone as needed for gas.  Otherwise continue current medications and treatment.   Follow up with specialists per their recommendations.  Call office for lab results if you have not received a call from our office or [employer] message in 1-2 days.

## 2025-02-25 NOTE — PROGRESS NOTES
Subjective        Rita Prado is a 60 y.o. female who presents to [facility].     Chief Complaint   Patient presents with    Hypertension     6 month fl/u       History of Present Illness    Patient presents for follow up of hypertension.    Hypertension - stable - taking metoprolol-hctz 50-25mg daily, needs a refill.  No chest pain, shortness of breath, palpitations.  Rarely with trace bilateral ankle edema. Not currently followed by cardiology, saw Dr. Carrasco in past.  Prediabetes - stable - not on meds, most recent hgba1c 6.1 in 5/2024. Does not check blood glucose at home.   GERD/dysphagia - improving but persists, followed by GI.  Had egd/colonoscopy 10/2024 with findings of normal egd s/p dil to 56 fr, diverticultis in sigmoid treated with doxycycline, adenomatous colon polyp, mod diverticulosis of entire colon, erosion in sigmoid colon with benign bx. She is taking pantoprazole 20mg daily, pepcid was stopped.  She still c/o bloat and gas, takes miralax a few times a week, will have fecal urgency and smearing of stool if she takes more often.  Still with some reflux, still with mild dysphagia to solid/liquids/pills.  She has not tried simethicone. No prior esophageal manometry.  Lumbar radiculopathy/lumbar degenerative disc disease-chronic, stable-followed by neurosurgery Dr. Presley, s/p right L5-S4 foraminotomy 5/2024.  Also followed by pain management Dr. Snyder, takes Norco 7.54 times daily as needed for severe pain, no longer taking gabapentin.  Will take ibuprofen 800 mg 3 times daily as needed for pain.    The following portions of the patient's history were reviewed and updated as appropriate: allergies, current medications, past family history, past medical history, past social history, past surgical history and problem list.    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 DNF before 3/12/2025, Disp: 120 tablet, Rfl: 0    HYDROcodone-acetaminophen (NORCO) 7.5-325 MG per tablet, Take 1 tablet by mouth 4 (Four) Times a Day As Needed for Severe Pain., Disp: 120 tablet, Rfl: 0    ibuprofen (ADVIL,MOTRIN) 800 MG tablet, Take 1 tablet by mouth 3 (Three) Times a Day As Needed for Moderate Pain., Disp: 90 tablet, Rfl: 5    metoprolol-hydrochlorothiazide (LOPRESSOR HCT) 50-25 MG per tablet, Take 1 tablet by mouth Daily., Disp: 90 tablet, Rfl: 1    pantoprazole (PROTONIX) 20 MG EC tablet, Take 1 tablet by mouth Daily., Disp: , Rfl:     valACYclovir (VALTREX) 1000 MG tablet, Take 1 tablet by mouth Daily., Disp: 90 tablet, Rfl: 1    gabapentin (NEURONTIN) 100 MG capsule, TAKE 3 CAPSULES BY MOUTH THREE TIMES DAILY AS NEEDED FOR PAIN, Disp: 180 capsule, Rfl: 1    Review of Systems     Objective     /89 (BP Location: Left arm, Patient Position: Sitting, Cuff Size: Adult)   Pulse 73   Temp 98.5 °F (36.9 °C) (Oral)   Resp 18   Ht 165.1 cm (65\")   Wt 94.6 kg (208 lb 9.6 oz)   SpO2 98%   BMI 34.71 kg/m²         Physical Exam  Vitals and nursing note reviewed.   Constitutional:       General: She is not in acute distress.     Appearance: Normal appearance. She is obese. She is not ill-appearing or diaphoretic.   HENT:      Head: Normocephalic and atraumatic.      Right Ear: Hearing normal.      Left Ear: Hearing, tympanic membrane, ear canal and external ear normal.      Ears:      Comments: Large amount of dark tan cerumen right external ear canal, unable to visualize " tympanic membrane.     Nose: Nose normal.      Mouth/Throat:      Mouth: Mucous membranes are moist.   Eyes:      General: No scleral icterus.     Conjunctiva/sclera: Conjunctivae normal.   Neck:      Thyroid: No thyroid mass, thyromegaly or thyroid tenderness.   Cardiovascular:      Rate and Rhythm: Normal rate and regular rhythm.      Pulses: Normal pulses.   Pulmonary:      Effort: Pulmonary effort is normal. No respiratory distress.      Breath sounds: Normal breath sounds. No wheezing.   Abdominal:      General: Bowel sounds are normal. There is no distension.      Palpations: Abdomen is soft.      Tenderness: There is no abdominal tenderness. There is no guarding.   Musculoskeletal:      Cervical back: Normal range of motion and neck supple.      Lumbar back: Decreased range of motion.      Right lower leg: No edema.      Left lower leg: No edema.   Lymphadenopathy:      Cervical: No cervical adenopathy.   Skin:     General: Skin is warm and dry.      Capillary Refill: Capillary refill takes less than 2 seconds.      Coloration: Skin is not jaundiced.   Neurological:      Mental Status: She is alert and oriented to person, place, and time.      Gait: Gait abnormal (slow).   Psychiatric:         Mood and Affect: Mood normal.         Behavior: Behavior normal.         Thought Content: Thought content normal.         Judgment: Judgment normal.          Result Review    The following data was reviewed by: KELLI Bryant on 02/25/2025:  CMP          3/22/2024    12:30 5/20/2024    12:57 9/6/2024    17:28   CMP   Glucose 96  94  76    BUN 13  11  10    Creatinine 0.78  0.77  0.63    EGFR 86.5  87.9  101.1    Sodium 141  139  140    Potassium 4.3  4.0  4.0    Chloride 105  103  102    Calcium 9.4  9.5  10.1    Total Protein 7.3      Albumin 4.3      Globulin 3.0      Total Bilirubin 0.3      Alkaline Phosphatase 84      AST (SGOT) 17      ALT (SGPT) 18      Albumin/Globulin Ratio 1.4      BUN/Creatinine Ratio  16.7  14.3  15.9    Anion Gap 6.0  8.0  9.0      CBC          3/22/2024    12:30 5/20/2024    12:57 9/6/2024    17:28   CBC   WBC 5.82  5.49  4.95    RBC 4.50  4.44  4.54    Hemoglobin 13.7  13.9  13.5    Hematocrit 41.2  39.6  40.7    MCV 91.6  89.2  89.6    MCH 30.4  31.3  29.7    MCHC 33.3  35.1  33.2    RDW 12.6  12.2  13.0    Platelets 356  405  347      CBC w/diff          3/22/2024    12:30 5/20/2024    12:57 9/6/2024    17:28   CBC w/Diff   WBC 5.82  5.49  4.95    RBC 4.50  4.44  4.54    Hemoglobin 13.7  13.9  13.5    Hematocrit 41.2  39.6  40.7    MCV 91.6  89.2  89.6    MCH 30.4  31.3  29.7    MCHC 33.3  35.1  33.2    RDW 12.6  12.2  13.0    Platelets 356  405  347    Neutrophil Rel % 35.1  33.7  40.1    Immature Granulocyte Rel % 0.2  0.2  0.2    Lymphocyte Rel % 53.4  54.3  44.2    Monocyte Rel % 8.9  7.8  11.7    Eosinophil Rel % 1.7  3.3  3.0    Basophil Rel % 0.7  0.7  0.8      Lipid Panel          3/22/2024    12:30   Lipid Panel   Total Cholesterol 194    Triglycerides 140    HDL Cholesterol 52    VLDL Cholesterol 25    LDL Cholesterol  117    LDL/HDL Ratio 2.19      TSH          3/22/2024    12:30   TSH   TSH 1.040      A1C Last 3 Results          3/22/2024    12:30 5/20/2024    12:57   HGBA1C Last 3 Results   Hemoglobin A1C 6.30  6.10                 Assessment & Plan    Diagnoses and all orders for this visit:    1. Primary hypertension (Primary)  -     T3, Free; Future  -     T4, Free; Future  -     TSH; Future  -     CBC & Differential; Future  -     metoprolol-hydrochlorothiazide (LOPRESSOR HCT) 50-25 MG per tablet; Take 1 tablet by mouth Daily.  Dispense: 90 tablet; Refill: 1    2. Prediabetes  -     Comprehensive Metabolic Panel; Future  -     Hemoglobin A1c; Future    3. Gastroesophageal reflux disease without esophagitis  -     T3, Free; Future  -     T4, Free; Future  -     TSH; Future  -     CBC & Differential; Future    4. Dysphagia, unspecified type    5. Hyperlipidemia, unspecified  hyperlipidemia type  -     Lipid Panel; Future    6. Lumbar radiculopathy    7. Excessive cerumen in ear canal, right    Other orders  -     valACYclovir (VALTREX) 1000 MG tablet; Take 1 tablet by mouth Daily.  Dispense: 90 tablet; Refill: 1       Patient Instructions   Can try otc simethicone as needed for gas.  Otherwise continue current medications and treatment.   Follow up with specialists per their recommendations.  Call office for lab results if you have not received a call from our office or [employer] message in 1-2 days.      Advised to use debrox otc per package instructions for excess earwax in right ear.       Follow Up   Return in about 6 months (around 8/25/2025) for Annual physical, Recheck, Hypertension, Hyperlipidemia, prediabetes.    Patient was given instructions and counseling regarding her condition or for health maintenance advice. Please see specific information pulled into the AVS if appropriate.     Radha Marsh, APRN     02/25/25

## 2025-02-25 NOTE — PROGRESS NOTES
FOLLOW UP VISIT        Patient Name: Rita Prado  : 1965  Primary Care Physician: Radha Marsh APRN        Chief Complaint:  right hip pain    HPI:   History of Present Illness  The patient presents for evaluation of right hip pain.    She was last seen 3 weeks ago and received a steroid injection for her right hip bursa, which initially provided relief but has since ceased to be effective. She reports persistent numbness in her leg, accompanied by frequent sharp pains in her thigh and the top of her foot. She also experiences pain in her buttock and tailbone, even when sitting on the toilet. She has a history of back surgery at L5-S1 in 2023.  Despite attempts to walk her dog, she is unable to walk long distances.      She has previously consulted with pain management who administered a right SI injection that provided 70 to 80 percent relief for a day. She has been experiencing these symptoms for an extended period. She has not had any recent groin pain. She has been attending physical therapy sessions which seem to exacerbate her symptoms.         Past Medical/Surgical, Social and Family History:  I have reviewed and/or updated pertinent history as noted in the medical record including:  Past Medical History:   Diagnosis Date    Achilles tendinitis     rt foot    Anxiety     Depression     Elevated hemoglobin A1c     GERD (gastroesophageal reflux disease)     Groin discomfort     inner left side    Hip pain     left    Hypertension     Leg pain     left    Low back pain     Obesity     PONV (postoperative nausea and vomiting)     Shoulder pain      Past Surgical History:   Procedure Laterality Date    LUMBAR DECOMPRESSION Right 2024    Procedure: Right lumbar L5-S1 foraminotomy;  Surgeon: Chris Presley IV, MD;  Location: [facility] OR;  Service: Neurosurgery;  Laterality: Right;    NECK SURGERY      ROTATOR CUFF REPAIR      Right    SHOULDER SURGERY Left     frozen shoulder     Social History      Occupational History    Not on file   Tobacco Use    Smoking status: Never     Passive exposure: Past    Smokeless tobacco: Never   Vaping Use    Vaping status: Never Used   Substance and Sexual Activity    Alcohol use: Not Currently    Drug use: Not Currently    Sexual activity: Defer      Social History     Social History Narrative    Not on file     Family History   Problem Relation Age of Onset    Hypertension Mother     Breast cancer Mother     Heart failure Mother     Dementia Father     Alcohol abuse Father     Hypertension Father     Stroke Father        Allergies:   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    gabapentin (NEURONTIN) 100 MG capsule TAKE 3 CAPSULES BY MOUTH THREE TIMES DAILY AS NEEDED FOR PAIN    [START ON 3/12/2025] HYDROcodone-acetaminophen (NORCO) 7.5-325 MG per tablet Take 1 tablet by mouth 4 (Four) Times a Day As Needed for Severe Pain. DNF before 3/12/2025    HYDROcodone-acetaminophen (NORCO) 7.5-325 MG per tablet Take 1 tablet by mouth 4 (Four) Times a Day As Needed for Severe Pain.    ibuprofen (ADVIL,MOTRIN) 800 MG tablet Take 1 tablet by mouth 3 (Three) Times a Day As Needed for Moderate Pain.    metoprolol-hydrochlorothiazide (LOPRESSOR HCT) 50-25 MG per tablet Take 1 tablet by mouth Daily.    pantoprazole (PROTONIX) 20 MG EC tablet Take 1 tablet by mouth Daily.    valACYclovir (VALTREX) 1000 MG tablet Take 1 tablet by mouth Daily.    [DISCONTINUED] famotidine (PEPCID) 40 MG tablet TAKE 1 TABLET BY MOUTH DAILY    [DISCONTINUED] fluconazole (DIFLUCAN) 150 MG tablet Take 1 tablet by  mouth. (Patient not taking: Reported on 2/25/2025)    [DISCONTINUED] metoprolol-hydrochlorothiazide (LOPRESSOR HCT) 50-25 MG per tablet TAKE 1 TABLET BY MOUTH DAILY    [DISCONTINUED] valACYclovir (VALTREX) 1000 MG tablet valacyclovir 1 gram tablet   TAKE ONE (1) TABLET BY MOUTH DAILY     No current facility-administered medications on file prior to visit.         ROS:  14 point review of systems was negative except as listed in the HPI     Physical Exam:   60 y.o. female  Body mass index is 34.61 kg/m²., 94.3 kg (208 lb)  Vitals:    02/25/25 1231   Resp: 20   SpO2: 98%         General: Alert, cooperative, appears well and in no observable distress.   HEENT: Normocephalic, atraumatic on external visual inspection. No icterus.   CV: No significant peripheral edema.   Respiratory: Normal respiratory effort.   Skin: Warm & well perfused; appropriate skin turgor.  Psych: Appropriate mood & affect.  Neuro: Gross sensation and motor intact in affected extremity/extremities.  Vascular: Peripheral pulses palpable in affected extremity/extremities. Calves/compartments soft and non tender, no evidence of DVT or compartment syndrome.    Right Hip Exam     Muscle Strength   The patient has normal right hip strength.              Hip Musculoskeletal Exam    Inspection    Right        Right hip inspection is normal.    Palpation    Right      Increased warmth: none      Tenderness: present        Greater trochanteric region pain: moderate        Lower lumbar region pain: moderate    Range of Motion    Right      Active ROM: normal.        Passive ROM: normal.        Active flexion: 140.       Passive flexion: 140.       Active internal rotation: 45.       Passive internal rotation: 45.       Active external rotation: 45.       Passive external rotation: 45.       Active abduction: 45.       Passive abduction: 45.     Strength    Right      Right hip strength is normal.        Investigations:  XR HIP W OR WO PELVIS 2-3 VIEW RIGHT      Date of Exam: 1/29/2025 12:48 PM EST     Indication: Eval hip     Comparison: 7/13/2023     Findings:  There are moderate degenerative changes in the hips. No fractures, dislocations or acute osseous abnormalities are identified. There are degenerative enthesophytes. Degenerative changes are present in the lower lumbar spine. Pelvic phleboliths are   present.     IMPRESSION:  Impression:  Moderate degenerative change. No acute osseous abnormalities are identified.        Electronically Signed: Arden León MD    2/2/2025 11:49 AM EST    Workstation ID: [id number]             Assessment:  Assessment & Plan  1. Right hip pain.  The patient's symptoms do not align with the typical presentation of hip arthritis, despite the presence of some arthritic changes on imaging. The pain is more likely originating from the L5-S1 region of the spine. She reports persistent numbness in her leg, accompanied by frequent sharp pains in her thigh and the top of her foot. She also experiences pain in her buttock and tailbone, even when sitting on the toilet. She has a history of back surgery at L5-S1 in 05/2023, performed by Dr. Presley.  She has an upcoming appointment on 03/06/2025.     I would advise follow up with ARMANDO as her symptoms/presentation are more lower back directed.         Body mass index is 34.61 kg/m².  BMI consistent with Obese Class I: 30-34.9kg/m2       Patient encouraged to call with questions or concerns in the interim      KELLI Sky     Patient or patient representative verbalized consent for the use of Ambient Listening during the visit with  KELLI Sky for chart documentation. 2/25/2025  12:58 EST

## 2025-03-04 NOTE — PROGRESS NOTES
"Subjective   History of Present Illness: Rita Prado is a 60 y.o. female is here today for follow-up sacroiliitis. Today patient reports right buttock & leg pain with numbness and tingling. She saw orthopedic for her hip which was ruled out.  Her pain has progressed and changed over the course of time now she complains of pain radiating from her buttock down her lateral thigh and calf and into her foot.  This is similar to prior to surgery.  She underwent an SI joint injection which provided her with minimal to no improvement.    Chief Complaint   Patient presents with    Follow-up     Right buttock pain          Previous treatment: physical therapy-34 visits    Previous neurosurgery:  Right lumbar L5-S1 foraminotomy 5/30/24    Previous injections:     The following portions of the patient's history were reviewed and updated as appropriate: allergies, current medications, past family history, past medical history, past social history, past surgical history, and problem list.    Review of Systems   Constitutional:  Positive for activity change.   Respiratory:  Negative for chest tightness and shortness of breath.    Cardiovascular:  Negative for chest pain.   Musculoskeletal:  Positive for arthralgias, back pain, gait problem and myalgias.        + leg pain   Neurological:  Positive for numbness.        + tingling       Objective      Pulse 75   Temp 97.8 °F (36.6 °C)   Resp 18   Ht 165.1 cm (65\")   Wt 94.3 kg (208 lb)   LMP  (LMP Unknown)   BMI 34.61 kg/m²    Body mass index is 34.61 kg/m².  Vitals:    03/06/25 1045   PainSc: 6    PainLoc: Buttocks         Neurological Exam        Assessment & Plan   Independent Review of Radiographic Studies:      I personally reviewed and interpreted the images from the following studies.    No new imaging    Medical Decision Making:      Rita Prado is a 60 y.o. female status post L5-S1 right-sided foraminotomies who initially got significant relief but with " progressively worsening symptoms most consistent with L5-S1 radiculopathy.  Hip problem has been rule out an SI joint problem as well.  I will send the patient for MRI of the lumbar spine as well as lumbar flexion-extension x-rays and she can follow-up.  If there is still significant foraminal stenosis she would benefit from surgery likely L5-S1 ALIF.  If stenosis is better or depending on the patient's preference we could try spinal cord stimulator.      Diagnoses and all orders for this visit:    1. Lumbar radiculopathy (Primary)      No follow-ups on file.    This patient was examined wearing appropriate personal protective equipment.                      Dr. Chris Presley IV    03/06/25  11:22 EST

## 2025-03-06 ENCOUNTER — OFFICE VISIT (OUTPATIENT)
Dept: NEUROSURGERY | Facility: CLINIC | Age: 60
End: 2025-03-06
Payer: COMMERCIAL

## 2025-03-06 VITALS
WEIGHT: 208 LBS | TEMPERATURE: 97.8 F | RESPIRATION RATE: 18 BRPM | HEART RATE: 75 BPM | HEIGHT: 65 IN | BODY MASS INDEX: 34.66 KG/M2

## 2025-03-06 DIAGNOSIS — M54.16 LUMBAR RADICULOPATHY: Primary | ICD-10-CM

## 2025-03-07 DIAGNOSIS — M54.16 LUMBAR RADICULOPATHY: Primary | ICD-10-CM

## 2025-04-02 ENCOUNTER — LAB (OUTPATIENT)
Dept: LAB | Facility: HOSPITAL | Age: 60
End: 2025-04-02
Payer: COMMERCIAL

## 2025-04-02 ENCOUNTER — HOSPITAL ENCOUNTER (OUTPATIENT)
Dept: GENERAL RADIOLOGY | Facility: HOSPITAL | Age: 60
Discharge: HOME | End: 2025-04-02
Payer: COMMERCIAL

## 2025-04-02 ENCOUNTER — HOSPITAL ENCOUNTER (OUTPATIENT)
Dept: MRI IMAGING | Facility: HOSPITAL | Age: 60
Discharge: HOME | End: 2025-04-02
Payer: COMMERCIAL

## 2025-04-02 DIAGNOSIS — M54.16 LUMBAR RADICULOPATHY: ICD-10-CM

## 2025-04-02 DIAGNOSIS — K21.9 GASTROESOPHAGEAL REFLUX DISEASE WITHOUT ESOPHAGITIS: ICD-10-CM

## 2025-04-02 DIAGNOSIS — I10 PRIMARY HYPERTENSION: ICD-10-CM

## 2025-04-02 DIAGNOSIS — R73.03 PREDIABETES: ICD-10-CM

## 2025-04-02 DIAGNOSIS — E78.5 HYPERLIPIDEMIA, UNSPECIFIED HYPERLIPIDEMIA TYPE: ICD-10-CM

## 2025-04-02 LAB
ALBUMIN SERPL-MCNC: 4.2 G/DL (ref 3.5–5.2)
ALBUMIN/GLOB SERPL: 1.2 G/DL
ALP SERPL-CCNC: 89 U/L (ref 39–117)
ALT SERPL W P-5'-P-CCNC: 15 U/L (ref 1–33)
ANION GAP SERPL CALCULATED.3IONS-SCNC: 8.5 MMOL/L (ref 5–15)
AST SERPL-CCNC: 16 U/L (ref 1–32)
BASOPHILS # BLD AUTO: 0.03 10*3/MM3 (ref 0–0.2)
BASOPHILS NFR BLD AUTO: 0.6 % (ref 0–1.5)
BILIRUB SERPL-MCNC: 0.4 MG/DL (ref 0–1.2)
BUN SERPL-MCNC: 10 MG/DL (ref 8–23)
BUN/CREAT SERPL: 10.8 (ref 7–25)
CALCIUM SPEC-SCNC: 9.9 MG/DL (ref 8.6–10.5)
CHLORIDE SERPL-SCNC: 99 MMOL/L (ref 98–107)
CHOLEST SERPL-MCNC: 220 MG/DL (ref 0–200)
CO2 SERPL-SCNC: 30.5 MMOL/L (ref 22–29)
CREAT SERPL-MCNC: 0.93 MG/DL (ref 0.57–1)
DEPRECATED RDW RBC AUTO: 45.9 FL (ref 37–54)
EGFRCR SERPLBLD CKD-EPI 2021: 69.6 ML/MIN/1.73
EOSINOPHIL # BLD AUTO: 0.1 10*3/MM3 (ref 0–0.4)
EOSINOPHIL NFR BLD AUTO: 2 % (ref 0.3–6.2)
ERYTHROCYTE [DISTWIDTH] IN BLOOD BY AUTOMATED COUNT: 13.5 % (ref 12.3–15.4)
GLOBULIN UR ELPH-MCNC: 3.5 GM/DL
GLUCOSE SERPL-MCNC: 90 MG/DL (ref 65–99)
HBA1C MFR BLD: 6.02 % (ref 4.8–5.6)
HCT VFR BLD AUTO: 45.2 % (ref 34–46.6)
HDLC SERPL-MCNC: 54 MG/DL (ref 40–60)
HGB BLD-MCNC: 14.2 G/DL (ref 12–15.9)
IMM GRANULOCYTES # BLD AUTO: 0.02 10*3/MM3 (ref 0–0.05)
IMM GRANULOCYTES NFR BLD AUTO: 0.4 % (ref 0–0.5)
LDLC SERPL CALC-MCNC: 151 MG/DL (ref 0–100)
LDLC/HDLC SERPL: 2.77 {RATIO}
LYMPHOCYTES # BLD AUTO: 2.04 10*3/MM3 (ref 0.7–3.1)
LYMPHOCYTES NFR BLD AUTO: 39.9 % (ref 19.6–45.3)
MCH RBC QN AUTO: 29 PG (ref 26.6–33)
MCHC RBC AUTO-ENTMCNC: 31.4 G/DL (ref 31.5–35.7)
MCV RBC AUTO: 92.4 FL (ref 79–97)
MONOCYTES # BLD AUTO: 0.67 10*3/MM3 (ref 0.1–0.9)
MONOCYTES NFR BLD AUTO: 13.1 % (ref 5–12)
NEUTROPHILS NFR BLD AUTO: 2.25 10*3/MM3 (ref 1.7–7)
NEUTROPHILS NFR BLD AUTO: 44 % (ref 42.7–76)
NRBC BLD AUTO-RTO: 0 /100 WBC (ref 0–0.2)
PLATELET # BLD AUTO: 353 10*3/MM3 (ref 140–450)
PMV BLD AUTO: 9.3 FL (ref 6–12)
POTASSIUM SERPL-SCNC: 4.2 MMOL/L (ref 3.5–5.2)
PROT SERPL-MCNC: 7.7 G/DL (ref 6–8.5)
RBC # BLD AUTO: 4.89 10*6/MM3 (ref 3.77–5.28)
SODIUM SERPL-SCNC: 138 MMOL/L (ref 136–145)
T3FREE SERPL-MCNC: 2.63 PG/ML (ref 2–4.4)
T4 FREE SERPL-MCNC: 1.07 NG/DL (ref 0.92–1.68)
TRIGL SERPL-MCNC: 83 MG/DL (ref 0–150)
TSH SERPL DL<=0.05 MIU/L-ACNC: 0.86 UIU/ML (ref 0.27–4.2)
VLDLC SERPL-MCNC: 15 MG/DL (ref 5–40)
WBC NRBC COR # BLD AUTO: 5.11 10*3/MM3 (ref 3.4–10.8)

## 2025-04-02 PROCEDURE — 84481 FREE ASSAY (FT-3): CPT

## 2025-04-02 PROCEDURE — 80050 GENERAL HEALTH PANEL: CPT

## 2025-04-02 PROCEDURE — 36415 COLL VENOUS BLD VENIPUNCTURE: CPT

## 2025-04-02 PROCEDURE — 72120 X-RAY BEND ONLY L-S SPINE: CPT

## 2025-04-02 PROCEDURE — 80061 LIPID PANEL: CPT

## 2025-04-02 PROCEDURE — 83036 HEMOGLOBIN GLYCOSYLATED A1C: CPT

## 2025-04-02 PROCEDURE — 84439 ASSAY OF FREE THYROXINE: CPT

## 2025-04-02 PROCEDURE — 72148 MRI LUMBAR SPINE W/O DYE: CPT

## 2025-04-10 ENCOUNTER — OFFICE VISIT (OUTPATIENT)
Dept: PAIN MEDICINE | Facility: CLINIC | Age: 60
End: 2025-04-10
Payer: COMMERCIAL

## 2025-04-10 VITALS
HEART RATE: 74 BPM | RESPIRATION RATE: 16 BRPM | BODY MASS INDEX: 34.95 KG/M2 | SYSTOLIC BLOOD PRESSURE: 131 MMHG | DIASTOLIC BLOOD PRESSURE: 90 MMHG | OXYGEN SATURATION: 98 % | WEIGHT: 210 LBS

## 2025-04-10 DIAGNOSIS — M96.1 POSTLAMINECTOMY SYNDROME OF LUMBAR REGION: ICD-10-CM

## 2025-04-10 DIAGNOSIS — M46.1 SACROILIITIS: Primary | ICD-10-CM

## 2025-04-10 DIAGNOSIS — M96.1 POSTLAMINECTOMY SYNDROME OF CERVICAL REGION: ICD-10-CM

## 2025-04-10 DIAGNOSIS — G89.4 CHRONIC PAIN SYNDROME: ICD-10-CM

## 2025-04-10 DIAGNOSIS — Z79.899 HIGH RISK MEDICATION USE: ICD-10-CM

## 2025-04-10 RX ORDER — PREGABALIN 50 MG/1
50 CAPSULE ORAL 2 TIMES DAILY PRN
Qty: 60 CAPSULE | Refills: 0 | Status: SHIPPED | OUTPATIENT
Start: 2025-04-10

## 2025-04-10 RX ORDER — HYDROCODONE BITARTRATE AND ACETAMINOPHEN 7.5; 325 MG/1; MG/1
1 TABLET ORAL 4 TIMES DAILY PRN
Qty: 120 TABLET | Refills: 0 | Status: SHIPPED | OUTPATIENT
Start: 2025-04-10

## 2025-04-10 RX ORDER — HYDROCODONE BITARTRATE AND ACETAMINOPHEN 7.5; 325 MG/1; MG/1
1 TABLET ORAL 4 TIMES DAILY PRN
Qty: 120 TABLET | Refills: 0 | Status: SHIPPED | OUTPATIENT
Start: 2025-05-10

## 2025-04-10 NOTE — PROGRESS NOTES
Subjective    CC back pain  Rita Prado is a 60 y.o. female with polyarthralgia S/P bilateral shoulder surgeries, chronic neck pain S/P ACDF Oct 2018/Dr. Tai,  S/P  right L5-S1 laminectomy with Dr. Presley 2024, here for f/u.     Complains of continued and worsening right SI pain/buttock pain.  Constant worse with prolonged sitting or any activity.  She had 80% relief with last right SI injection with functional benefits.  Requesting repeat.  Continued back pain and left lower extremity radicular pain especially worse at night.  This is significantly impairing ADL and sleep.  Cannot tolerate gabapentin.    Chronic mid back/thoracic back pain associated with significant paraspinal muscle spasm and pain.  Chronic lower back pain radiating to bilateral lower extremity usually radiating to right hip, right leg worse with standing prolonged sitting or activity.  Denies weakness, bladder bowel continence.  Chronic neck pain from postlaminectomy syndrome with paraspinal muscle spasm radiating to bilateral shoulders.     Pain interfere with ADL/sleep and work.   Good relief of caudal UBALDO, transforaminal UBALDO, cervical UBALDO.    Utilizes hydrocodone with good relief of functional benefits and denies side effects.    L-spine MRI reviewed 5/2021 showing moderate degenerative changes progressed from previous.  Notably disc bulge with superimposed small right paracentral disc extrusion traversing 3 mm inferiorly narrowing the right lateral recess and abutting the descending right S1 nerve root. Mild bilateral facet arthropathy. Mild spinal canal stenosis. Moderate to severe right and severe left neural foraminal stenosis.     C-spine MRI multilevel degenerative changes, slight retrolisthesis C5-C6 asymmetry to the left.  Multiple level foraminal narrowing.  Osteophyte complex..     Pain Assessment   Location of Pain: Lower Back, R Hip, L Hip, L/R Leg, neck pain, joint  Description of Pain: Dull/Aching, Throbbing,  Stabbing  Previous Pain Rating :5  Current Pain Ratin  Aggravating Factors: Activity  Alleviating Factors: Rest, Medication    PEG Assessment   What number best describes your pain on average in the past week?7  What number best describes how, during the past week, pain has interfered with your enjoyment of life?5  What number best describes how, during the past week, pain has interfered with your general activity? 10     The following portions of the patient's history were reviewed and updated as appropriate: allergies, current medications, past family history, past medical history, past social history, past surgical history and problem list.      Review of Systems   Musculoskeletal:  Positive for back pain.        Left leg pain and weakness   All other systems reviewed and are negative.    Objective   Physical Exam  Vitals reviewed.   Constitutional:       General: She is not in acute distress.  Pulmonary:      Effort: Pulmonary effort is normal.   Musculoskeletal:      Cervical back: Tenderness present. Decreased range of motion.      Lumbar back: Tenderness present. Decreased range of motion. Positive right straight leg raise test and positive left straight leg raise test.      Comments: Lumbar loading positive, pain on extension of low back past 5 degrees.  TTP on the lumbar facets noted.  Jasmin bilaterally, positive Gaenslen bilaterally, positive SI compression test bilaterally     /90 (BP Location: Left arm, Patient Position: Sitting, Cuff Size: Adult)   Pulse 74   Resp 16   Wt 95.3 kg (210 lb)   LMP  (LMP Unknown)   SpO2 98%   BMI 34.95 kg/m²      PHQ 9 on chart  Opioid risk tool low risk    Assessment & Plan   Diagnoses and all orders for this visit:    1. Sacroiliitis (Primary)  -     SI Joint Injection    2. Chronic pain syndrome    3. Postlaminectomy syndrome of cervical region  -     HYDROcodone-acetaminophen (NORCO) 7.5-325 MG per tablet; Take 1 tablet by mouth 4 (Four) Times a Day As  Needed for Severe Pain. DNF before 5/10/2025  Dispense: 120 tablet; Refill: 0  -     HYDROcodone-acetaminophen (NORCO) 7.5-325 MG per tablet; Take 1 tablet by mouth 4 (Four) Times a Day As Needed for Severe Pain.  Dispense: 120 tablet; Refill: 0    4. Postlaminectomy syndrome of lumbar region  -     pregabalin (LYRICA) 50 MG capsule; Take 1 capsule by mouth 2 (Two) Times a Day As Needed (leg pain).  Dispense: 60 capsule; Refill: 0  -     HYDROcodone-acetaminophen (NORCO) 7.5-325 MG per tablet; Take 1 tablet by mouth 4 (Four) Times a Day As Needed for Severe Pain. DNF before 5/10/2025  Dispense: 120 tablet; Refill: 0  -     HYDROcodone-acetaminophen (NORCO) 7.5-325 MG per tablet; Take 1 tablet by mouth 4 (Four) Times a Day As Needed for Severe Pain.  Dispense: 120 tablet; Refill: 0    5. High risk medication use      Summary  Rita Prado is a 59 y.o. female with polyarthralgia S/P bilateral shoulder surgeries, chronic back pain, neck pain S/P cervical fusion Oct 2018/Dr. Tai S/P  right L5-S1 laminectomy with Dr. Presley, here for f/u.   Chronic pain from lumbar DDD spondylosis with occasional right lower extremity radicular pain..  Chronic neck pain postlaminectomy syndrome.  Repeat caudal as needed. Had 70% relief with last caudal lasting over 3 months.    Complains of continued and worsening right SI pain/buttock pain.  Constant worse with prolonged sitting or any activity.  She had 80% relief with last right SI injection with functional benefits.  Requesting repeat.  Scheduled for repeat right therapeutic sacroiliac joint injection.    Continued back pain and left lower extremity radicular pain especially worse at night.  This is significantly impairing ADL and sleep.  Cannot tolerate gabapentin.  Marginal relief after physical therapy.    Continue hydrocodone to 7.5/325 3-4 times daily. UDS and inspect reviewed.   Discussed risk of tolerance, dependence, respiratory depression, coma and death associated with  use of oral opioids for treatment of chronic nonmalignant pain.     Continue baclofen, ibuprofen as needed.    RTC 2-3mo

## 2025-04-24 NOTE — PROGRESS NOTES
"Subjective   History of Present Illness: Rita Prado is a 60 y.o. female is here today for follow-up. Today patient reports right buttock and leg pain with numbness and tingling.  No changes since she was last seen.  The back pain and leg pain is significantly interfering with her daily life and causing significant issues.  She has tried physical therapy and injections without any lasting relief.      Chief Complaint   Patient presents with    Back Pain    Leg Pain          Previous treatment: Norco, Lyrica, Baclofen, Gabapentin, NSAID's,    Previous neurosurgery:  5/30/2024- Right Lumbar L5-S1 foraminotomy    Previous injections:     The following portions of the patient's history were reviewed and updated as appropriate: allergies, current medications, past family history, past medical history, past social history, past surgical history, and problem list.    Review of Systems   Constitutional:  Positive for activity change.   Respiratory:  Negative for chest tightness and shortness of breath.    Cardiovascular:  Negative for chest pain.   Musculoskeletal:  Positive for back pain and myalgias.        + buttock & leg pain   Neurological:  Positive for numbness.        + tingling       Objective      Pulse 75   Resp 18   Ht 165.1 cm (65\")   Wt 95.3 kg (210 lb)   LMP  (LMP Unknown)   SpO2 97%   BMI 34.95 kg/m²    Body mass index is 34.95 kg/m².  Vitals:    04/28/25 1121   PainLoc: Buttocks         Neurological Exam        Assessment & Plan   Independent Review of Radiographic Studies:      I personally reviewed and interpreted the images from the following studies.    MRI lumbar spine: Redemonstration of severe degenerative changes from L4-S1.  At L4-5 there is some moderate right foraminal stenosis but no significant central stenosis.  At L5-S1 patient continues to have significant foraminal stenosis bilaterally right worse than left.  Postoperative changes identified at this level    Lumbar " flexion-extension x-ray: Spondylolisthesis with dynamic instability at L4-5.  Stable retrolisthesis at L5-S1.    Medical Decision Making:      Rita Prado is a 60 y.o. female status post right L5-S1 foraminotomy performed nearly a year ago.  Patient got significant relief for several months following surgery but her right sided radicular symptoms have returned.  She also has worsening low back pain.  Imaging workup demonstrates significant degenerative changes from L4-S1 with continued foraminal stenosis especially at L5-S1.  There is also dynamic instability at L4-5 and retrolisthesis at L5-S1.  In order to allow complete decompression of the patient's nerves and treat her spondylolisthesis, I recommend L4-S1 ALIF and posterior fusion.  Patient would like to think about surgery and will let us know if she would like to proceed.  We will order a DEXA scan in the meantime to ensure that her bone density is okay for potential fusion surgery.      Diagnoses and all orders for this visit:    1. Lumbar radiculopathy (Primary)    2. Degeneration of intervertebral disc of lumbar region with discogenic back pain and lower extremity pain    3. Spondylolisthesis of lumbar region      No follow-ups on file.    This patient was examined wearing appropriate personal protective equipment.                      Dr. Chris Presley IV    04/28/25  12:03 EDT

## 2025-04-28 ENCOUNTER — OFFICE VISIT (OUTPATIENT)
Dept: NEUROSURGERY | Facility: CLINIC | Age: 60
End: 2025-04-28
Payer: COMMERCIAL

## 2025-04-28 VITALS
RESPIRATION RATE: 18 BRPM | HEART RATE: 75 BPM | WEIGHT: 210 LBS | BODY MASS INDEX: 34.99 KG/M2 | HEIGHT: 65 IN | OXYGEN SATURATION: 97 %

## 2025-04-28 DIAGNOSIS — M43.16 SPONDYLOLISTHESIS OF LUMBAR REGION: ICD-10-CM

## 2025-04-28 DIAGNOSIS — M51.362 DEGENERATION OF INTERVERTEBRAL DISC OF LUMBAR REGION WITH DISCOGENIC BACK PAIN AND LOWER EXTREMITY PAIN: ICD-10-CM

## 2025-04-28 DIAGNOSIS — M54.16 LUMBAR RADICULOPATHY: Primary | ICD-10-CM

## 2025-04-28 PROCEDURE — 99214 OFFICE O/P EST MOD 30 MIN: CPT | Performed by: NEUROLOGICAL SURGERY

## 2025-04-30 ENCOUNTER — OFFICE (AMBULATORY)
Dept: URBAN - METROPOLITAN AREA CLINIC 64 | Facility: CLINIC | Age: 60
End: 2025-04-30
Payer: COMMERCIAL

## 2025-04-30 ENCOUNTER — TRANSCRIBE ORDERS (OUTPATIENT)
Dept: ADMINISTRATIVE | Facility: HOSPITAL | Age: 60
End: 2025-04-30
Payer: COMMERCIAL

## 2025-04-30 VITALS
HEIGHT: 65 IN | SYSTOLIC BLOOD PRESSURE: 118 MMHG | DIASTOLIC BLOOD PRESSURE: 80 MMHG | WEIGHT: 213 LBS | HEART RATE: 78 BPM

## 2025-04-30 DIAGNOSIS — K21.9 GASTRO-ESOPHAGEAL REFLUX DISEASE WITHOUT ESOPHAGITIS: ICD-10-CM

## 2025-04-30 DIAGNOSIS — R14.0 ABDOMINAL DISTENSION (GASEOUS): ICD-10-CM

## 2025-04-30 DIAGNOSIS — R11.0 NAUSEA: ICD-10-CM

## 2025-04-30 DIAGNOSIS — T40.2X5A OPIOID-INDUCED CONSTIPATION: ICD-10-CM

## 2025-04-30 DIAGNOSIS — K59.03 DRUG INDUCED CONSTIPATION: ICD-10-CM

## 2025-04-30 DIAGNOSIS — R13.10 DYSPHAGIA, UNSPECIFIED: ICD-10-CM

## 2025-04-30 DIAGNOSIS — R14.0 BLOATING: Primary | ICD-10-CM

## 2025-04-30 DIAGNOSIS — R13.10 DYSPHAGIA, UNSPECIFIED TYPE: ICD-10-CM

## 2025-04-30 DIAGNOSIS — K21.9 CHRONIC GERD: ICD-10-CM

## 2025-04-30 DIAGNOSIS — K59.03 OPIOID-INDUCED CONSTIPATION: ICD-10-CM

## 2025-04-30 PROCEDURE — 99214 OFFICE O/P EST MOD 30 MIN: CPT

## 2025-04-30 RX ORDER — ESOMEPRAZOLE MAGNESIUM 40 MG/1
40 CAPSULE, DELAYED RELEASE ORAL
Qty: 90 | Refills: 3 | Status: ACTIVE
Start: 2025-04-30

## 2025-05-05 ENCOUNTER — HOSPITAL ENCOUNTER (OUTPATIENT)
Dept: PAIN MEDICINE | Facility: HOSPITAL | Age: 60
Discharge: HOME | End: 2025-05-05
Payer: COMMERCIAL

## 2025-05-05 VITALS
HEIGHT: 65 IN | WEIGHT: 210 LBS | SYSTOLIC BLOOD PRESSURE: 115 MMHG | TEMPERATURE: 96.9 F | BODY MASS INDEX: 34.99 KG/M2 | RESPIRATION RATE: 16 BRPM | DIASTOLIC BLOOD PRESSURE: 77 MMHG | HEART RATE: 75 BPM | OXYGEN SATURATION: 99 %

## 2025-05-05 DIAGNOSIS — R52 PAIN: ICD-10-CM

## 2025-05-05 DIAGNOSIS — M46.1 SACROILIITIS: Primary | ICD-10-CM

## 2025-05-05 PROCEDURE — 77003 FLUOROGUIDE FOR SPINE INJECT: CPT

## 2025-05-05 PROCEDURE — 25010000002 BUPIVACAINE (PF) 0.25 % SOLUTION: Performed by: ANESTHESIOLOGY

## 2025-05-05 PROCEDURE — 25510000001 IOPAMIDOL 41 % SOLUTION: Performed by: ANESTHESIOLOGY

## 2025-05-05 PROCEDURE — 27096 INJECT SACROILIAC JOINT: CPT | Performed by: ANESTHESIOLOGY

## 2025-05-05 PROCEDURE — 25010000002 METHYLPREDNISOLONE PER 40 MG: Performed by: ANESTHESIOLOGY

## 2025-05-05 RX ORDER — BUPIVACAINE HYDROCHLORIDE 2.5 MG/ML
10 INJECTION, SOLUTION EPIDURAL; INFILTRATION; INTRACAUDAL; PERINEURAL ONCE
Status: COMPLETED | OUTPATIENT
Start: 2025-05-05 | End: 2025-05-05

## 2025-05-05 RX ORDER — IOPAMIDOL 408 MG/ML
3 INJECTION, SOLUTION INTRATHECAL
Status: COMPLETED | OUTPATIENT
Start: 2025-05-05 | End: 2025-05-05

## 2025-05-05 RX ORDER — METHYLPREDNISOLONE ACETATE 40 MG/ML
40 INJECTION, SUSPENSION INTRA-ARTICULAR; INTRALESIONAL; INTRAMUSCULAR; SOFT TISSUE ONCE
Status: COMPLETED | OUTPATIENT
Start: 2025-05-05 | End: 2025-05-05

## 2025-05-05 RX ADMIN — METHYLPREDNISOLONE ACETATE 40 MG: 40 INJECTION, SUSPENSION INTRA-ARTICULAR; INTRALESIONAL; INTRAMUSCULAR; INTRASYNOVIAL; SOFT TISSUE at 10:31

## 2025-05-05 RX ADMIN — BUPIVACAINE HYDROCHLORIDE 5 ML: 2.5 INJECTION, SOLUTION EPIDURAL; INFILTRATION; INTRACAUDAL; PERINEURAL at 10:31

## 2025-05-05 RX ADMIN — IOPAMIDOL 3 ML: 408 INJECTION, SOLUTION INTRATHECAL at 10:31

## 2025-05-06 ENCOUNTER — TELEPHONE (OUTPATIENT)
Dept: PAIN MEDICINE | Facility: HOSPITAL | Age: 60
End: 2025-05-06
Payer: COMMERCIAL

## 2025-05-07 ENCOUNTER — TELEPHONE (OUTPATIENT)
Dept: NEUROSURGERY | Facility: CLINIC | Age: 60
End: 2025-05-07
Payer: COMMERCIAL

## 2025-05-07 NOTE — TELEPHONE ENCOUNTER
Caller: BEULAH    Relationship: SELF    Best call back number: [phone]    What is the best time to reach you: ANYTIME    Who are you requesting to speak with (clinical staff, provider,  specific staff member): LANDON    What was the call regarding: PATIENT CALLED INQUIRING IF WE EVER RECEIVED HER DISABILITY PAPERWORK - STATES IT SHOULD HAVE BEEN FAXED OVER ON MONDAY 05.05.25     PLEASE CALL WITH UPDATE  THANK YOU

## 2025-05-08 NOTE — TELEPHONE ENCOUNTER
RECEIVED PAPERWORK THIS AM IN FAX QUEUE. SPOKE WITH PATIENT AND INFORMED HER OF THIS AND THAT I WOULD FAX THEM AS SOON AS COMPLETE. SHE VERBALIZED UNDERSTANDING.

## 2025-05-08 NOTE — TELEPHONE ENCOUNTER
AT LAST OFFICE VISIT, PATIENT WAS THINKING ABOUT WHETHER SHE WANTS SURGERY AND TO FOLLOW UP WITH YOU WHEN SHE DECIDES. ARE YOU OKAY WITH EXTENDING HER LTD, AND IF SO, TO WHEN AS SHE DOESN'T HAVE ANOTHER APPOINTMENT SCHEDULED YET?

## 2025-05-16 ENCOUNTER — TELEPHONE (OUTPATIENT)
Dept: NEUROSURGERY | Facility: CLINIC | Age: 60
End: 2025-05-16

## 2025-05-16 NOTE — TELEPHONE ENCOUNTER
Spoke with pt. Pt informed me she wanted to speak with Mendel. She says she has been trying to get disability but for some reason she has been cancelled. I informed her that she called the [place] office and I would send a message to the [place] office to give her a call back. Please Advise.

## 2025-05-19 NOTE — TELEPHONE ENCOUNTER
Called and LVM to call the office. I would like to discuss her forms & surgery. Please transfer the call to Anna in the office when she calls back.

## 2025-06-20 ENCOUNTER — TELEPHONE (OUTPATIENT)
Dept: NEUROSURGERY | Facility: CLINIC | Age: 60
End: 2025-06-20

## 2025-06-20 NOTE — TELEPHONE ENCOUNTER
Patient called stating she needs to speak with Mendel about paper work. She said its to help her get her insurance back at work.

## 2025-06-23 NOTE — TELEPHONE ENCOUNTER
SPOKE WITH PATIENT, SHE STATED SHE HAD RECEIVED A LETTER SAYING HER INSURANCE HAD BEEN CANCELLED EFFECTIVE 4/23/25. SHE IS APPEALING THIS DECISION AND ASKED IF WE COULD FAX HER RECORDS FROM THAT POINT TO [employer]. I ASKED HER TO FAX/BRING ME A COPY OF THE LETTER AND FORM AND I WOULD ATTACH IT TO THE PAPERWORK I SENT. SHE VERBALIZED UNDERSTANDING.

## 2025-07-21 ENCOUNTER — TELEPHONE (OUTPATIENT)
Dept: NEUROSURGERY | Facility: CLINIC | Age: 60
End: 2025-07-21

## 2025-07-21 NOTE — TELEPHONE ENCOUNTER
PATIENT CALLED AND IS REQUESTING TO SPEAK WITH LANDON ON DISABILITY PAPERWORK     STATES LANDON HAS BEEN HELPING HER WITH THIS AND SHE IS COMING UP WITH ANOTHER DEADLINE     DEADLINE IS 7/23/25    LANDON PLEASE CALL PATIENT     [phone]    THANK YOU!

## 2025-07-21 NOTE — TELEPHONE ENCOUNTER
Called patient and explained to her that Mendel is out of the office today and will return her call tomorrow. She did also ask how much an in office visit was out of pocket and I did tell her that I did not know that information but I thought maybe it was around $100.00 to $150.00 per visit but I wasn't sure. I did transfer her to the  for further inquiry.

## 2025-07-21 NOTE — TELEPHONE ENCOUNTER
Patient called back and stated that no one answered the phone when I transferred the call. I did tell her that Per Laney at the  each Office Visit would be $100.00. She then stated that she would like to schedule a Follow up with Dr Presley and I told I could do that. She then Stated that she would like to wait and speak with Mendel before scheduling.

## 2025-07-24 NOTE — TELEPHONE ENCOUNTER
COMPLETED PAPERWORK AGAIN WITH EXTENDED DATES, RE FAXED TO [employer], SEE CONFIRMATION BELOW. SPOKE WITH PATIENT AND INFORMED HER OF THIS, AND INFORMED HER THAT I WOULD UPLOAD IT TO HER CHART AS SOON AS I RECEIVED CONFIRMATION. SHE VERBALIZED UNDERSTANDING.

## 2025-08-04 ENCOUNTER — OFFICE VISIT (OUTPATIENT)
Dept: NEUROSURGERY | Facility: CLINIC | Age: 60
End: 2025-08-04
Payer: MEDICAID

## 2025-08-04 VITALS
HEIGHT: 65 IN | RESPIRATION RATE: 18 BRPM | WEIGHT: 202 LBS | HEART RATE: 75 BPM | OXYGEN SATURATION: 97 % | BODY MASS INDEX: 33.66 KG/M2

## 2025-08-04 DIAGNOSIS — M51.362 DEGENERATION OF INTERVERTEBRAL DISC OF LUMBAR REGION WITH DISCOGENIC BACK PAIN AND LOWER EXTREMITY PAIN: ICD-10-CM

## 2025-08-04 DIAGNOSIS — M43.16 SPONDYLOLISTHESIS OF LUMBAR REGION: Primary | ICD-10-CM

## 2025-08-04 DIAGNOSIS — M54.16 LUMBAR RADICULOPATHY: ICD-10-CM

## (undated) DEVICE — SOLUTION,WATER,IRRIGATION,1000ML,STERILE: Brand: MEDLINE

## (undated) DEVICE — 6.0MM PRECISION ROUND

## (undated) DEVICE — ELECTRD BLD EZ CLN MOD 6.5IN

## (undated) DEVICE — 3.0MM PRECISION NEURO (MATCH HEAD)

## (undated) DEVICE — PAD, GROUNDING, UNIVERSAL, SPLIT, 9': Brand: MEDLINE

## (undated) DEVICE — DRP C/ARMOR

## (undated) DEVICE — PRECISION MATCH HEAD

## (undated) DEVICE — ANTIBACTERIAL UNDYED BRAIDED (POLYGLACTIN 910), SYNTHETIC ABSORBABLE SUTURE: Brand: COATED VICRYL

## (undated) DEVICE — KENDALL SCD EXPRESS FOOT CUFF, MEDIUM: Brand: KENDALL SCD

## (undated) DEVICE — ANTIBACTERIAL VIOLET BRAIDED (POLYGLACTIN 910), SYNTHETIC ABSORBABLE SUTURE: Brand: COATED VICRYL

## (undated) DEVICE — DRP MICROSCP SURG SMARTDRAPE VISIONGUARD

## (undated) DEVICE — GLV SURG BIOGEL LTX PF 8

## (undated) DEVICE — DRAPE,INSTRUMENT,MAGNETIC,10X16: Brand: MEDLINE

## (undated) DEVICE — SPONGE,NEURO,1"X1",XR,STRL,LF,10/PK: Brand: MEDLINE

## (undated) DEVICE — SMOKE EVACUATION TUBING WITH 7/8 IN TO 1/4 IN REDUCER: Brand: BUFFALO FILTER

## (undated) DEVICE — TUBING, SUCTION, 1/4" X 12', STRAIGHT: Brand: MEDLINE

## (undated) DEVICE — PK BASIC SPINE 50

## (undated) DEVICE — SHEET, DRAPE, SPLIT, STERILE: Brand: MEDLINE

## (undated) DEVICE — UNDERGLV SURG BIOGEL/PI PF SYNTH SURG SZ8.5 BLU 50/BX

## (undated) DEVICE — DECANTER: Brand: UNBRANDED

## (undated) DEVICE — DRSNG WND BORDR/ADHS NONADHR/GZ LF 4X4IN STRL

## (undated) DEVICE — Device

## (undated) DEVICE — PRT MIN/ACC MARS PEEK STR 22X60MM 1P/U

## (undated) DEVICE — KT SURG TURNOVER 050